# Patient Record
Sex: FEMALE | Race: WHITE | Employment: OTHER | URBAN - METROPOLITAN AREA
[De-identification: names, ages, dates, MRNs, and addresses within clinical notes are randomized per-mention and may not be internally consistent; named-entity substitution may affect disease eponyms.]

---

## 2018-09-23 ENCOUNTER — HOSPITAL ENCOUNTER (INPATIENT)
Age: 83
LOS: 4 days | Discharge: HOME HEALTH | DRG: 291 | End: 2018-09-27
Attending: EMERGENCY MEDICINE | Admitting: HOSPITALIST
Payer: MEDICARE

## 2018-09-23 ENCOUNTER — APPOINTMENT (OUTPATIENT)
Dept: GENERAL RADIOLOGY | Age: 83
DRG: 291 | End: 2018-09-23
Payer: MEDICARE

## 2018-09-23 DIAGNOSIS — E87.1 HYPONATREMIA: ICD-10-CM

## 2018-09-23 DIAGNOSIS — E87.6 HYPOKALEMIA: ICD-10-CM

## 2018-09-23 DIAGNOSIS — I50.23 ACUTE ON CHRONIC SYSTOLIC CONGESTIVE HEART FAILURE (HCC): ICD-10-CM

## 2018-09-23 DIAGNOSIS — R06.00 DYSPNEA, UNSPECIFIED TYPE: Primary | ICD-10-CM

## 2018-09-23 DIAGNOSIS — E83.41 HYPERMAGNESEMIA: ICD-10-CM

## 2018-09-23 PROBLEM — I10 HTN (HYPERTENSION): Status: ACTIVE | Noted: 2018-09-23

## 2018-09-23 PROBLEM — N17.9 AKI (ACUTE KIDNEY INJURY) (HCC): Status: ACTIVE | Noted: 2018-09-23

## 2018-09-23 PROBLEM — I50.31 ACUTE DIASTOLIC HEART FAILURE (HCC): Status: ACTIVE | Noted: 2018-09-23

## 2018-09-23 LAB
A/G RATIO: 0.7 (ref 1.1–2.2)
ALBUMIN SERPL-MCNC: 3.4 G/DL (ref 3.4–5)
ALP BLD-CCNC: 158 U/L (ref 40–129)
ALT SERPL-CCNC: 24 U/L (ref 10–40)
ANION GAP SERPL CALCULATED.3IONS-SCNC: 15 MMOL/L (ref 3–16)
ANION GAP SERPL CALCULATED.3IONS-SCNC: 16 MMOL/L (ref 3–16)
AST SERPL-CCNC: 30 U/L (ref 15–37)
BACTERIA: ABNORMAL /HPF
BACTERIA: ABNORMAL /HPF
BASOPHILS ABSOLUTE: 0 K/UL (ref 0–0.2)
BASOPHILS RELATIVE PERCENT: 0.4 %
BILIRUB SERPL-MCNC: 0.9 MG/DL (ref 0–1)
BILIRUBIN URINE: NEGATIVE
BILIRUBIN URINE: NEGATIVE
BLOOD, URINE: NEGATIVE
BLOOD, URINE: NEGATIVE
BUN BLDV-MCNC: 37 MG/DL (ref 7–20)
BUN BLDV-MCNC: 38 MG/DL (ref 7–20)
CALCIUM SERPL-MCNC: 9.3 MG/DL (ref 8.3–10.6)
CALCIUM SERPL-MCNC: 9.7 MG/DL (ref 8.3–10.6)
CHLORIDE BLD-SCNC: 77 MMOL/L (ref 99–110)
CHLORIDE BLD-SCNC: 80 MMOL/L (ref 99–110)
CLARITY: ABNORMAL
CLARITY: CLEAR
CO2: 36 MMOL/L (ref 21–32)
CO2: 36 MMOL/L (ref 21–32)
COLOR: YELLOW
COLOR: YELLOW
CREAT SERPL-MCNC: 1.8 MG/DL (ref 0.6–1.2)
CREAT SERPL-MCNC: 1.8 MG/DL (ref 0.6–1.2)
EOSINOPHILS ABSOLUTE: 0.1 K/UL (ref 0–0.6)
EOSINOPHILS RELATIVE PERCENT: 0.5 %
EPITHELIAL CELLS, UA: 0 /HPF (ref 0–5)
EPITHELIAL CELLS, UA: 0 /HPF (ref 0–5)
GFR AFRICAN AMERICAN: 32
GFR AFRICAN AMERICAN: 32
GFR NON-AFRICAN AMERICAN: 27
GFR NON-AFRICAN AMERICAN: 27
GLOBULIN: 5 G/DL
GLUCOSE BLD-MCNC: 214 MG/DL (ref 70–99)
GLUCOSE BLD-MCNC: 237 MG/DL (ref 70–99)
GLUCOSE URINE: NEGATIVE MG/DL
GLUCOSE URINE: NEGATIVE MG/DL
HCT VFR BLD CALC: 37.5 % (ref 36–48)
HEMOGLOBIN: 12.8 G/DL (ref 12–16)
HYALINE CASTS: 1 /LPF (ref 0–8)
HYALINE CASTS: 3 /LPF (ref 0–8)
KETONES, URINE: NEGATIVE MG/DL
KETONES, URINE: NEGATIVE MG/DL
LEUKOCYTE ESTERASE, URINE: ABNORMAL
LEUKOCYTE ESTERASE, URINE: ABNORMAL
LIPASE: 16 U/L (ref 13–60)
LYMPHOCYTES ABSOLUTE: 1 K/UL (ref 1–5.1)
LYMPHOCYTES RELATIVE PERCENT: 8.9 %
MAGNESIUM: 2.5 MG/DL (ref 1.8–2.4)
MCH RBC QN AUTO: 30.1 PG (ref 26–34)
MCHC RBC AUTO-ENTMCNC: 34.2 G/DL (ref 31–36)
MCV RBC AUTO: 88 FL (ref 80–100)
MICROSCOPIC EXAMINATION: YES
MICROSCOPIC EXAMINATION: YES
MONOCYTES ABSOLUTE: 1 K/UL (ref 0–1.3)
MONOCYTES RELATIVE PERCENT: 9 %
NEUTROPHILS ABSOLUTE: 9 K/UL (ref 1.7–7.7)
NEUTROPHILS RELATIVE PERCENT: 81.2 %
NITRITE, URINE: POSITIVE
NITRITE, URINE: POSITIVE
PDW BLD-RTO: 15.9 % (ref 12.4–15.4)
PH UA: 6.5
PH UA: 6.5
PLATELET # BLD: 324 K/UL (ref 135–450)
PMV BLD AUTO: 9.2 FL (ref 5–10.5)
POTASSIUM REFLEX MAGNESIUM: 2.6 MMOL/L (ref 3.5–5.1)
POTASSIUM SERPL-SCNC: 3.1 MMOL/L (ref 3.5–5.1)
PRO-BNP: 715 PG/ML (ref 0–449)
PROTEIN UA: NEGATIVE MG/DL
PROTEIN UA: NEGATIVE MG/DL
RBC # BLD: 4.27 M/UL (ref 4–5.2)
RBC UA: 0 /HPF (ref 0–4)
RBC UA: 1 /HPF (ref 0–4)
SODIUM BLD-SCNC: 129 MMOL/L (ref 136–145)
SODIUM BLD-SCNC: 131 MMOL/L (ref 136–145)
SPECIFIC GRAVITY UA: 1.01
SPECIFIC GRAVITY UA: 1.01
TOTAL PROTEIN: 8.4 G/DL (ref 6.4–8.2)
TROPONIN: <0.01 NG/ML
URIC ACID, SERUM: 13.9 MG/DL (ref 2.6–6)
URINE TYPE: ABNORMAL
UROBILINOGEN, URINE: 1 E.U./DL
UROBILINOGEN, URINE: 1 E.U./DL
WBC # BLD: 11.1 K/UL (ref 4–11)
WBC UA: 37 /HPF (ref 0–5)
WBC UA: 38 /HPF (ref 0–5)

## 2018-09-23 PROCEDURE — 83930 ASSAY OF BLOOD OSMOLALITY: CPT

## 2018-09-23 PROCEDURE — 6370000000 HC RX 637 (ALT 250 FOR IP): Performed by: EMERGENCY MEDICINE

## 2018-09-23 PROCEDURE — 36415 COLL VENOUS BLD VENIPUNCTURE: CPT

## 2018-09-23 PROCEDURE — 83735 ASSAY OF MAGNESIUM: CPT

## 2018-09-23 PROCEDURE — 81001 URINALYSIS AUTO W/SCOPE: CPT

## 2018-09-23 PROCEDURE — 83880 ASSAY OF NATRIURETIC PEPTIDE: CPT

## 2018-09-23 PROCEDURE — 82570 ASSAY OF URINE CREATININE: CPT

## 2018-09-23 PROCEDURE — 85025 COMPLETE CBC W/AUTO DIFF WBC: CPT

## 2018-09-23 PROCEDURE — 6360000002 HC RX W HCPCS: Performed by: EMERGENCY MEDICINE

## 2018-09-23 PROCEDURE — 94640 AIRWAY INHALATION TREATMENT: CPT

## 2018-09-23 PROCEDURE — 2580000003 HC RX 258: Performed by: INTERNAL MEDICINE

## 2018-09-23 PROCEDURE — 2500000003 HC RX 250 WO HCPCS: Performed by: HOSPITALIST

## 2018-09-23 PROCEDURE — 93005 ELECTROCARDIOGRAM TRACING: CPT | Performed by: EMERGENCY MEDICINE

## 2018-09-23 PROCEDURE — 83690 ASSAY OF LIPASE: CPT

## 2018-09-23 PROCEDURE — 51702 INSERT TEMP BLADDER CATH: CPT

## 2018-09-23 PROCEDURE — 99291 CRITICAL CARE FIRST HOUR: CPT

## 2018-09-23 PROCEDURE — 84133 ASSAY OF URINE POTASSIUM: CPT

## 2018-09-23 PROCEDURE — 83935 ASSAY OF URINE OSMOLALITY: CPT

## 2018-09-23 PROCEDURE — 84300 ASSAY OF URINE SODIUM: CPT

## 2018-09-23 PROCEDURE — 84550 ASSAY OF BLOOD/URIC ACID: CPT

## 2018-09-23 PROCEDURE — 82436 ASSAY OF URINE CHLORIDE: CPT

## 2018-09-23 PROCEDURE — 6360000002 HC RX W HCPCS: Performed by: HOSPITALIST

## 2018-09-23 PROCEDURE — 2580000003 HC RX 258: Performed by: HOSPITALIST

## 2018-09-23 PROCEDURE — 80053 COMPREHEN METABOLIC PANEL: CPT

## 2018-09-23 PROCEDURE — 2060000000 HC ICU INTERMEDIATE R&B

## 2018-09-23 PROCEDURE — 71046 X-RAY EXAM CHEST 2 VIEWS: CPT

## 2018-09-23 PROCEDURE — 96374 THER/PROPH/DIAG INJ IV PUSH: CPT

## 2018-09-23 PROCEDURE — 6370000000 HC RX 637 (ALT 250 FOR IP): Performed by: HOSPITALIST

## 2018-09-23 PROCEDURE — 84540 ASSAY OF URINE/UREA-N: CPT

## 2018-09-23 PROCEDURE — 2700000000 HC OXYGEN THERAPY PER DAY

## 2018-09-23 PROCEDURE — 84484 ASSAY OF TROPONIN QUANT: CPT

## 2018-09-23 PROCEDURE — 94760 N-INVAS EAR/PLS OXIMETRY 1: CPT

## 2018-09-23 RX ORDER — VENLAFAXINE HYDROCHLORIDE 150 MG/1
150 CAPSULE, EXTENDED RELEASE ORAL DAILY
Status: DISCONTINUED | OUTPATIENT
Start: 2018-09-24 | End: 2018-09-27 | Stop reason: HOSPADM

## 2018-09-23 RX ORDER — FUROSEMIDE 10 MG/ML
80 INJECTION INTRAMUSCULAR; INTRAVENOUS ONCE
Status: COMPLETED | OUTPATIENT
Start: 2018-09-23 | End: 2018-09-23

## 2018-09-23 RX ORDER — FUROSEMIDE 40 MG/1
40 TABLET ORAL PRN
COMMUNITY

## 2018-09-23 RX ORDER — SODIUM CHLORIDE 0.9 % (FLUSH) 0.9 %
10 SYRINGE (ML) INJECTION EVERY 12 HOURS SCHEDULED
Status: DISCONTINUED | OUTPATIENT
Start: 2018-09-23 | End: 2018-09-27 | Stop reason: HOSPADM

## 2018-09-23 RX ORDER — ONDANSETRON 2 MG/ML
4 INJECTION INTRAMUSCULAR; INTRAVENOUS EVERY 6 HOURS PRN
Status: DISCONTINUED | OUTPATIENT
Start: 2018-09-23 | End: 2018-09-27 | Stop reason: HOSPADM

## 2018-09-23 RX ORDER — POTASSIUM CHLORIDE 20 MEQ/1
40 TABLET, EXTENDED RELEASE ORAL PRN
Status: DISCONTINUED | OUTPATIENT
Start: 2018-09-23 | End: 2018-09-25

## 2018-09-23 RX ORDER — POTASSIUM CHLORIDE 7.45 MG/ML
10 INJECTION INTRAVENOUS PRN
Status: DISCONTINUED | OUTPATIENT
Start: 2018-09-23 | End: 2018-09-25

## 2018-09-23 RX ORDER — SODIUM CHLORIDE 9 MG/ML
INJECTION, SOLUTION INTRAVENOUS CONTINUOUS
Status: DISCONTINUED | OUTPATIENT
Start: 2018-09-23 | End: 2018-09-24

## 2018-09-23 RX ORDER — POTASSIUM CHLORIDE 7.45 MG/ML
10 INJECTION INTRAVENOUS
Status: COMPLETED | OUTPATIENT
Start: 2018-09-23 | End: 2018-09-23

## 2018-09-23 RX ORDER — DIGOXIN 125 MCG
125 TABLET ORAL DAILY
Status: DISCONTINUED | OUTPATIENT
Start: 2018-09-23 | End: 2018-09-24

## 2018-09-23 RX ORDER — TRIAMTERENE AND HYDROCHLOROTHIAZIDE 37.5; 25 MG/1; MG/1
1 TABLET ORAL DAILY
Status: ON HOLD | COMMUNITY
End: 2018-09-27 | Stop reason: HOSPADM

## 2018-09-23 RX ORDER — POTASSIUM CHLORIDE 20 MEQ/1
40 TABLET, EXTENDED RELEASE ORAL ONCE
Status: COMPLETED | OUTPATIENT
Start: 2018-09-23 | End: 2018-09-23

## 2018-09-23 RX ORDER — VENLAFAXINE 75 MG/1
150 TABLET ORAL DAILY
Status: ON HOLD | COMMUNITY
End: 2022-01-01

## 2018-09-23 RX ORDER — SODIUM CHLORIDE 9 MG/ML
INJECTION, SOLUTION INTRAVENOUS
Status: DISPENSED
Start: 2018-09-23 | End: 2018-09-24

## 2018-09-23 RX ORDER — SODIUM CHLORIDE 0.9 % (FLUSH) 0.9 %
10 SYRINGE (ML) INJECTION PRN
Status: DISCONTINUED | OUTPATIENT
Start: 2018-09-23 | End: 2018-09-27 | Stop reason: HOSPADM

## 2018-09-23 RX ORDER — METHYLPREDNISOLONE SODIUM SUCCINATE 40 MG/ML
40 INJECTION, POWDER, LYOPHILIZED, FOR SOLUTION INTRAMUSCULAR; INTRAVENOUS ONCE
Status: COMPLETED | OUTPATIENT
Start: 2018-09-23 | End: 2018-09-23

## 2018-09-23 RX ORDER — DIGOXIN 125 MCG
125 TABLET ORAL DAILY
COMMUNITY

## 2018-09-23 RX ADMIN — Medication 10 MEQ: at 14:25

## 2018-09-23 RX ADMIN — Medication 10 MEQ: at 16:30

## 2018-09-23 RX ADMIN — Medication 10 ML: at 21:00

## 2018-09-23 RX ADMIN — Medication 2 PUFF: at 21:11

## 2018-09-23 RX ADMIN — CEFTRIAXONE SODIUM 1 G: 10 INJECTION, POWDER, FOR SOLUTION INTRAVENOUS at 21:00

## 2018-09-23 RX ADMIN — POTASSIUM CHLORIDE 40 MEQ: 1500 TABLET, EXTENDED RELEASE ORAL at 14:25

## 2018-09-23 RX ADMIN — FUROSEMIDE 80 MG: 10 INJECTION, SOLUTION INTRAMUSCULAR; INTRAVENOUS at 13:27

## 2018-09-23 RX ADMIN — SODIUM CHLORIDE: 9 INJECTION, SOLUTION INTRAVENOUS at 17:57

## 2018-09-23 RX ADMIN — METHYLPREDNISOLONE SODIUM SUCCINATE 40 MG: 40 INJECTION, POWDER, FOR SOLUTION INTRAMUSCULAR; INTRAVENOUS at 16:30

## 2018-09-23 ASSESSMENT — ENCOUNTER SYMPTOMS
DIARRHEA: 0
BACK PAIN: 0
CHEST TIGHTNESS: 0
NAUSEA: 0
COUGH: 1
RHINORRHEA: 0
VOMITING: 0
SORE THROAT: 0
CONSTIPATION: 0
SHORTNESS OF BREATH: 1
ABDOMINAL PAIN: 0
WHEEZING: 1

## 2018-09-23 ASSESSMENT — PAIN SCALES - GENERAL: PAINLEVEL_OUTOF10: 0

## 2018-09-23 NOTE — ED PROVIDER NOTES
decreased urine volume, difficulty urinating, dysuria, flank pain, frequency and urgency. Musculoskeletal: Negative for back pain and neck pain. Skin: Negative for rash. Neurological: Negative for dizziness, weakness, numbness and headaches. Psychiatric/Behavioral: Negative for suicidal ideas. All other systems reviewed and are negative. Except as noted above the remainder of the review of systems was reviewed and negative. PAST MEDICAL HISTORY     Past Medical History:   Diagnosis Date    [facility]est[facility]     COPD [facility]chronic obstructive [facility] disease) ([facility]     Depression     Hypertension          SURGICAL HISTORY     History reviewed. No pertinent surgical history. CURRENT MEDICATIONS       Previous Medications    DIGOXIN (LANOXIN) 125 MCG TABLET    Take 125 mcg by mouth daily    FUROSEMIDE (LASIX) 40 MG TABLET    Take 80 mg by mouth 2 times daily    TRIAMTERENE-HYDROCHLOROTHIAZIDE (MAXZIDE-25) 37.5-25 MG PER TABLET    Take 1 tablet by mouth daily    VENLAFAXINE (EFFEXOR) 75 MG TABLET    Take by mouth 3 times daily 3 tabs po tid       ALLERGIES     Pcn [penicillins]    FAMILY HISTORY     History reviewed. No pertinent family history. SOCIAL HISTORY       Social History     Social History    Marital status:      Spouse name: N/A    Number of children: N/A    Years of education: N/A     Social History Main Topics    Smoking status: Never Smoker    Smokeless tobacco: None    Alcohol use No    Drug use: Unknown    Sexual activity: Not Asked     Other Topics Concern    None     Social History Narrative    None       SCREENINGS             PHYSICAL EXAM    (up to 7 for level 4, 8 or more for level 5)     ED Triage Vitals   BP Temp Temp src Pulse Resp SpO2 Height Weight   -- -- -- -- -- -- -- --       Physical Exam   Constitutional: She is oriented to person, place, and time. She appears well-developed and well-nourished. No distress.    HENT: Head: Normocephalic and atraumatic. Right Ear: External ear normal.   Left Ear: External ear normal.   Nose: Nose normal.   Mouth/Throat: Oropharynx is clear and moist. No oropharyngeal exudate. Eyes: Pupils are equal, round, and reactive to light. Conjunctivae and EOM are normal. No scleral icterus. Neck: Normal range of motion. Neck supple. No JVD present. Cardiovascular: Normal rate, regular rhythm, normal heart sounds and intact distal pulses. Exam reveals no gallop and no friction rub. No murmur heard. Pulmonary/Chest: She has wheezes. She has rales. Scattered wheezes, appears slightly dyspneic   Abdominal: Soft. Bowel sounds are normal. She exhibits no distension and no mass. There is no tenderness. There is no rebound and no guarding. Musculoskeletal: Normal range of motion. She exhibits edema. She exhibits no tenderness or deformity. Bilateral lower extremity edema, truncal edema   Lymphadenopathy:     She has no cervical adenopathy. Neurological: She is alert and oriented to person, place, and time. She has normal reflexes. No cranial nerve deficit. She exhibits normal muscle tone. Coordination normal.   Skin: Skin is warm and dry. She is not diaphoretic. Psychiatric: She has a normal mood and affect. Nursing note and vitals reviewed. DIAGNOSTIC RESULTS     EKG: All EKG's are interpreted by the Emergency Department Physician who either signs or Co-signs this chart in the absence of a cardiologist.    Sinus tachycardia rate of 104 otherwise normal intervals no ST elevations or depressions, nonspecific ST abnormality, no previous for comparison.     RADIOLOGY:   Non-plain film images such as CT, Ultrasound and MRI are read by the radiologist. Plain radiographic images are visualized and preliminarily interpreted by the emergency physician with the below findings:        Interpretation per the Radiologist below, if available at the time of this note:    XR CHEST STANDARD (2 VW) Final Result   Perihilar and mild bibasilar ground-glass opacities may represent mild   pulmonary edema. A viral infection may also be considered clinically.                ED BEDSIDE ULTRASOUND:   Performed by ED Physician - none    LABS:  Labs Reviewed   CBC WITH AUTO DIFFERENTIAL - Abnormal; Notable for the following:        Result Value    WBC 11.1 (*)     RDW 15.9 (*)     Neutrophils # 9.0 (*)     All other components within normal limits    Narrative:     Performed at:  [facility]  [address]   Phone [phone]   COMPREHENSIVE METABOLIC PANEL W/ REFLEX TO MG FOR LOW K - Abnormal; Notable for the following:     Sodium 129 (*)     Potassium reflex Magnesium 2.6 (*)     Chloride 77 (*)     CO2 36 (*)     Glucose 214 (*)     BUN 38 (*)     CREATININE 1.8 (*)     GFR Non- 27 (*)     GFR  32 (*)     Total Protein 8.4 (*)     Albumin/Globulin Ratio 0.7 (*)     Alkaline Phosphatase 158 (*)     All other components within normal limits    Narrative:     YINKA Torres  [facility] tel. [phone],  Chemistry results called to and read back by Hebert Sebastian RN, 09/23/2018  13:59, by [facility]  Performed at:  [facility]  [address] [facility], [address]   Phone [phone] - Abnormal; Notable for the following:     Pro- (*)     All other components within normal limits    Narrative:     Kezia Jacobo  [facility] tel. [phone],  Chemistry results called to and read back by Hebert Sebastian RN, 09/23/2018  13:59, by [facility]  Performed at:  [facility] [address] [facility] [address], [address]   Phone [phone]   MAGNESIUM - Abnormal; Notable for the following:     Magnesium 2.50 (*)     All other components within normal limits    Narrative:     Kezia Jacobo  [facility] tel. [phone],  Chemistry results called to and read back by Hebert Sebastian RN,

## 2018-09-23 NOTE — H&P
outpatient prescriptions on file prior to encounter. Allergies: Allergies   Allergen Reactions    Pcn [Penicillins]         Social History:   reports that she has never smoked. She does not have any smokeless tobacco history on file. She reports that she does not drink alcohol. Family History:  Sister CAD    Physical Exam:  /70   Pulse 109   Temp 97.8 °F (36.6 °C)   Resp 26   Wt 250 lb (113.4 kg)   SpO2 (!) 88%     General appearance:  Appears comfortable. Well nourished  Eyes: Sclera clear, pupils equal  ENT: Moist mucus membranes, no thrush. Trachea midline. Cardiovascular: Regular rhythm, normal S1, S2. No murmur, gallop, rub. No edema in lower extremities  Respiratory: b/l wheezing with crackels at the base   Gastrointestinal: Abdomen soft, non-tender, not distended, normal bowel sounds  Musculoskeletal: No cyanosis in digits, neck supple  Neurology: Cranial nerves grossly intact. Alert and oriented in time, place and person. No speech or motor deficits  Psychiatry: Appropriate affect. Not agitated  Skin: Warm, dry, normal turgor, no rash    Labs:  CBC:   Lab Results   Component Value Date    WBC 11.1 09/23/2018    RBC 4.27 09/23/2018    HGB 12.8 09/23/2018    HCT 37.5 09/23/2018    MCV 88.0 09/23/2018    MCH 30.1 09/23/2018    MCHC 34.2 09/23/2018    RDW 15.9 09/23/2018     09/23/2018    MPV 9.2 09/23/2018     BMP:    Lab Results   Component Value Date     09/23/2018    K 2.6 09/23/2018    CL 77 09/23/2018    CO2 36 09/23/2018    BUN 38 09/23/2018    CREATININE 1.8 09/23/2018    CALCIUM 9.7 09/23/2018    GFRAA 32 09/23/2018    LABGLOM 27 09/23/2018    GLUCOSE 214 09/23/2018       Chest Xray:   EKG:        Problem List  Active Problems:    Acute diastolic heart failure ([address]  Resolved Problems:    * No resolved hospital problems. *        Assessment/Plan:     Acute on chronic CHF exab  - likely cause of pt symptoms.  Failed outpatient regimen.   - given 80 of iv lasix in the

## 2018-09-23 NOTE — CONSULTS
MD Bouchra Jackson MD Michale Spray, MD               Office: [phone]                      Fax: [phone]            NEPHROLOGY INITIAL CONSULT NOTE:       PATIENT NAME: Wilmer Coe  : 1931  MRN: [id number]  SERVICE DATE: 2018   SERVICE TIME: 9:17 PM    REASON FOR CONSULT: I am asked to see this patient in consultation for my opinion  regarding management of Hyponatremia. My recommendations will be communicated by way of shared medical record. REQUESTING PHYSICIAN: Dr Ruth Hinkle: Horace Garcia DO    CHIEF COMPLAINT:   Chief Complaint   Patient presents with    Shortness of Breath     Patient c/o SOB and cough, progressively worse. patient hx of CHF. patient went to urgent care friday due to unable to get into PCP. Patinet has gotten progressively worse      History Obtained From:  patient, family member - her daughter, electronic medical record    HPI: Ms. Jacquiline Gitelman is a 80 y.o. female with significant past medical history of HTN COPD, depression, CHF , presents with with worsening shortness of breath and edema - was seen by her PCP 1 day ago - who increased her lasix from 40 once to 40 times a day - but no improvement in her symptoms. Also ad productive cough, no fever/chills. Patient denied chest pain/dizziness/lightheadedness/syncope/leg edema. Found to have Hyponatremia so we are called. Re: Hyponatremia  · Duration (when): acute   · Location (where): kidneys  · Severity: lowest SNa 129   · Context (ex: activity at onset or related to condition): HCTZ use, PRABHA pre-renal w/ on-going lasix, HCTZ, Triamterene use, + ? pneumonia  · Timing (ex: continuous, intermittent): continous  · Modifying factors (ex: medications, interventions): in ER first lasix --> then IVFs  · Associated signs & symptoms (ex: edema, SOB): as above     PAST MEDICAL HISTORY:   Past Medical History:   Diagnosis Date    [facility]congestive [facility] failure) ([facility]     COPD (chronic obstructive pulmonary disease) ([facility]     Depression     Hypertension      PAST SURGICAL HISTORY: History reviewed. No pertinent surgical history. FAMILY HISTORY: History reviewed. No pertinent family history. no known [address] of kidney disease  SOCIAL HISTORY:   Social History     Social History    Marital status:      Spouse name: N/A    Number of children: N/A    Years of education: N/A     Social History Main Topics    Smoking status: Never Smoker    Smokeless tobacco: None    Alcohol use No    Drug use: Unknown    Sexual activity: Not Asked     Other Topics Concern    None     Social History Narrative    None     MEDICATIONS:  Prior to Admission Medications:  Prescriptions Prior to Admission: triamterene-hydrochlorothiazide (MAXZIDE-25) 37.5-25 MG per tablet, Take 1 tablet by mouth daily  furosemide (LASIX) 40 MG tablet, Take 80 mg by mouth 2 times daily  venlafaxine (EFFEXOR) 75 MG tablet, Take by mouth 3 times daily 3 tabs po tid  digoxin (LANOXIN) 125 MCG tablet, Take 125 mcg by mouth daily     Scheduled Meds:   digoxin  125 mcg Oral Daily    [START ON 9/24/2018] venlafaxine  150 mg Oral Daily    sodium chloride flush  10 mL Intravenous 2 times per day    [START ON 9/24/2018] enoxaparin  40 mg Subcutaneous Daily    mometasone-formoterol  2 puff Inhalation BID    cefTRIAXone (ROCEPHIN) IV  1 g Intravenous Q24H     Continuous Infusions:   sodium chloride      sodium chloride 50 mL/hr at 09/23/18 1757     PRN Meds:. Allergies: Pcn [penicillins]    REVIEW OF SYSTEMS:  As mentioned in HPI   All other 10-point review of systems: negative.          PHYSICAL EXAM:  Patient Vitals for the past 24 hrs:   BP Temp Temp src Pulse Resp SpO2 Height Weight   09/23/18 2111 - - - - 17 94 % - -   09/23/18 2020 (!) 111/47 99 °F (37.2 °C) Oral 99 18 93 % - -   09/23/18 1634 - - - - 18 91 % - -   09/23/18 1600 124/65 98.9 °F (37.2 °C) Oral 100 24 91 % 4' 10\" Nephrotoxic medications have been discontinued. - Dose adjusted and appropriate. - Dose meds for eGFR ~30 mL/min/1.73m2.   - Avoid heavy opioids due to renal failure - may use very low dose dilaudid / fentanyl with close monitoring of CNS and respiratory depression. High Complexity. Multiple complex problems. Discussed with patient, family - her daughter and treatment team- referring physician  Thank you for allowing me to participate in this patient's care. Please do not hesitate to contact me with any questions/concerns. We will follow along with you.      Issac Gross MD       [facility] [address] [place] [address]  Office: [phone]   Fax: [phone]

## 2018-09-24 LAB
ANION GAP SERPL CALCULATED.3IONS-SCNC: 15 MMOL/L (ref 3–16)
BUN BLDV-MCNC: 38 MG/DL (ref 7–20)
CALCIUM SERPL-MCNC: 9.5 MG/DL (ref 8.3–10.6)
CHLORIDE BLD-SCNC: 87 MMOL/L (ref 99–110)
CHLORIDE URINE RANDOM: 63 MMOL/L
CO2: 35 MMOL/L (ref 21–32)
CREAT SERPL-MCNC: 1.7 MG/DL (ref 0.6–1.2)
CREATININE URINE: 64.4 MG/DL (ref 28–259)
GFR AFRICAN AMERICAN: 34
GFR NON-AFRICAN AMERICAN: 28
GLUCOSE BLD-MCNC: 183 MG/DL (ref 70–99)
MAGNESIUM: 2.7 MG/DL (ref 1.8–2.4)
OSMOLALITY URINE: 289 MOSM/KG (ref 390–1070)
OSMOLALITY: 278 MOSM/KG (ref 278–305)
POTASSIUM SERPL-SCNC: 3.3 MMOL/L (ref 3.5–5.1)
POTASSIUM, UR: 29.6 MMOL/L
PRO-BNP: 993 PG/ML (ref 0–449)
SODIUM BLD-SCNC: 132 MMOL/L (ref 136–145)
SODIUM BLD-SCNC: 132 MMOL/L (ref 136–145)
SODIUM BLD-SCNC: 134 MMOL/L (ref 136–145)
SODIUM BLD-SCNC: 134 MMOL/L (ref 136–145)
SODIUM BLD-SCNC: 137 MMOL/L (ref 136–145)
SODIUM URINE: 55 MMOL/L
UREA NITROGEN, UR: 262.3 MG/DL (ref 800–1666)

## 2018-09-24 PROCEDURE — 2700000000 HC OXYGEN THERAPY PER DAY

## 2018-09-24 PROCEDURE — 6370000000 HC RX 637 (ALT 250 FOR IP): Performed by: HOSPITALIST

## 2018-09-24 PROCEDURE — 94640 AIRWAY INHALATION TREATMENT: CPT

## 2018-09-24 PROCEDURE — 2500000003 HC RX 250 WO HCPCS: Performed by: HOSPITALIST

## 2018-09-24 PROCEDURE — 80048 BASIC METABOLIC PNL TOTAL CA: CPT

## 2018-09-24 PROCEDURE — 36415 COLL VENOUS BLD VENIPUNCTURE: CPT

## 2018-09-24 PROCEDURE — 2060000000 HC ICU INTERMEDIATE R&B

## 2018-09-24 PROCEDURE — 84295 ASSAY OF SERUM SODIUM: CPT

## 2018-09-24 PROCEDURE — 83880 ASSAY OF NATRIURETIC PEPTIDE: CPT

## 2018-09-24 PROCEDURE — 83735 ASSAY OF MAGNESIUM: CPT

## 2018-09-24 PROCEDURE — 6370000000 HC RX 637 (ALT 250 FOR IP): Performed by: INTERNAL MEDICINE

## 2018-09-24 PROCEDURE — 94760 N-INVAS EAR/PLS OXIMETRY 1: CPT

## 2018-09-24 PROCEDURE — 6360000002 HC RX W HCPCS: Performed by: HOSPITALIST

## 2018-09-24 PROCEDURE — 99223 1ST HOSP IP/OBS HIGH 75: CPT | Performed by: INTERNAL MEDICINE

## 2018-09-24 PROCEDURE — 93010 ELECTROCARDIOGRAM REPORT: CPT | Performed by: INTERNAL MEDICINE

## 2018-09-24 PROCEDURE — 6360000002 HC RX W HCPCS: Performed by: INTERNAL MEDICINE

## 2018-09-24 PROCEDURE — 2580000003 HC RX 258: Performed by: HOSPITALIST

## 2018-09-24 RX ORDER — FUROSEMIDE 10 MG/ML
20 INJECTION INTRAMUSCULAR; INTRAVENOUS 2 TIMES DAILY
Status: DISCONTINUED | OUTPATIENT
Start: 2018-09-24 | End: 2018-09-26

## 2018-09-24 RX ORDER — SPIRONOLACTONE 25 MG/1
25 TABLET ORAL DAILY
Status: DISCONTINUED | OUTPATIENT
Start: 2018-09-24 | End: 2018-09-27 | Stop reason: HOSPADM

## 2018-09-24 RX ORDER — FUROSEMIDE 20 MG/1
20 TABLET ORAL 2 TIMES DAILY
Status: DISCONTINUED | OUTPATIENT
Start: 2018-09-24 | End: 2018-09-24

## 2018-09-24 RX ORDER — FUROSEMIDE 10 MG/ML
20 INJECTION INTRAMUSCULAR; INTRAVENOUS 2 TIMES DAILY
Status: DISCONTINUED | OUTPATIENT
Start: 2018-09-24 | End: 2018-09-24

## 2018-09-24 RX ADMIN — ENOXAPARIN SODIUM 40 MG: 40 INJECTION SUBCUTANEOUS at 11:17

## 2018-09-24 RX ADMIN — Medication 10 ML: at 11:19

## 2018-09-24 RX ADMIN — POTASSIUM CHLORIDE 40 MEQ: 1500 TABLET, EXTENDED RELEASE ORAL at 00:39

## 2018-09-24 RX ADMIN — Medication 10 ML: at 22:07

## 2018-09-24 RX ADMIN — POTASSIUM CHLORIDE 40 MEQ: 1500 TABLET, EXTENDED RELEASE ORAL at 11:19

## 2018-09-24 RX ADMIN — Medication 2 PUFF: at 08:33

## 2018-09-24 RX ADMIN — SPIRONOLACTONE 25 MG: 25 TABLET ORAL at 17:39

## 2018-09-24 RX ADMIN — VENLAFAXINE HYDROCHLORIDE 150 MG: 150 CAPSULE, EXTENDED RELEASE ORAL at 11:31

## 2018-09-24 RX ADMIN — CEFTRIAXONE SODIUM 1 G: 10 INJECTION, POWDER, FOR SOLUTION INTRAVENOUS at 21:56

## 2018-09-24 RX ADMIN — FUROSEMIDE 20 MG: 10 INJECTION, SOLUTION INTRAMUSCULAR; INTRAVENOUS at 17:40

## 2018-09-24 RX ADMIN — POTASSIUM CHLORIDE 40 MEQ: 1500 TABLET, EXTENDED RELEASE ORAL at 21:56

## 2018-09-24 ASSESSMENT — PAIN SCALES - GENERAL
PAINLEVEL_OUTOF10: 0

## 2018-09-24 NOTE — PROGRESS NOTES
Effexor dose verified with daughter at bedside d/t conflicting information in chart. Pt daughter states that the pt takes this only once a day in the AM and she takes 150mg dose. Pharmacy notified.

## 2018-09-24 NOTE — CONSULTS
[facility]  [facility]/Pulmonary Hypertension   Cardiac Evaluation      Nate Alonzo  YOB: 1931    Requesting Physician:  Silviano Chung      Chief Complaint   Patient presents with    Shortness of Breath     Patient c/o SOB and cough, progressively worse. patient hx of CHF. patient went to urgent care friday due to unable to get into PCP. Patinet has gotten progressively worse         History of Present Illness:  Nate Alonzo is an 79 yo female with a history of CHF and COPD who presents to the ED with worsening shortness of breath and edema. She says that the symptoms have been going on for awhile but had gotten progressively worse. She was diagnosed in the past year with CHF by Dr. Kati Alvarado, and was placed on diuretic. She has not had an echo. She went to an urgent care and digoxin and lasix was started. She saw her physician on Friday who increased her lasix from 40 mg once a day to 40 mg twice a day. Despite this, she did not see any improvement. She has had increasing cough and shortness of breath. She sleeps sitting up in a chair but she has done this for years. She is coughing up clear sputum. No fever or chills, chest pain or diaphoresis. No headaches, numbness, tingling, or paresthesias. Her shortness of breath was worse with activity. Her daughter is with her and at the bedside. Today she is feeling better. She is sitting up in a chair. Diuresing.     Allergies   Allergen Reactions    Pcn [Penicillins]      Current Facility-Administered Medications   Medication Dose Route Frequency Provider Last Rate Last Dose    furosemide (LASIX) tablet 20 mg  20 mg Oral BID Aj Bernard MD        digoxin (LANOXIN) tablet 125 mcg  125 mcg Oral Daily Edna Higgins MD        venlafaxine (EFFEXOR XR) extended release capsule 150 mg  150 mg Oral Daily Silviano Chung MD   150 mg at 09/24/18 1131    sodium chloride flush 0.9 % injection 10 mL  10 mL Intravenous 2 times per day Jaden Bazan MD   10 mL at 09/24/18 1119    sodium chloride flush 0.9 % injection 10 mL  10 mL Intravenous PRN Edna Higgins MD        magnesium hydroxide (MILK OF MAGNESIA) 400 MG/5ML suspension 30 mL  30 mL Oral Daily PRN Edna Higgins MD        ondansetron (ZOFRAN) injection 4 mg  4 mg Intravenous Q6H PRN Edna Higgins MD        enoxaparin (LOVENOX) injection 40 mg  40 mg Subcutaneous Daily Edna Higgins MD   40 mg at 09/24/18 1117    potassium chloride (KLOR-CON M) extended release tablet 40 mEq  40 mEq Oral PRN Edna Higgins MD   40 mEq at 09/24/18 1119    Or    potassium bicarb-citric acid (EFFER-K) effervescent tablet 40 mEq  40 mEq Oral PRN Edna Higgins MD        Or   Ramone Martínez potassium chloride 10 mEq/100 mL IVPB (Peripheral Line)  10 mEq Intravenous PRN Jaden Bazan MD        mometasone-formoterol (DULERA) 200-5 MCG/ACT inhaler 2 puff  2 puff Inhalation BID Jaden Bazan MD   2 puff at 09/24/18 2892    cefTRIAXone (ROCEPHIN) 1 g in sterile water 10 mL IV syringe  1 g Intravenous Q24H Jaden Bazan MD   1 g at 09/23/18 2100       Past Medical History:   Diagnosis Date    [facility] (congestive heart [facility]) ([facility]     COPD (chronic obstructive pulmonary disease) ([facility]     Depression     Hypertension      History reviewed. No pertinent surgical history. History reviewed. No pertinent family history. Social History     Social History    Marital status:      Spouse name: N/A    Number of children: N/A    Years of education: N/A     Occupational History    Not on file. Social History Main Topics    Smoking status: Never Smoker    Smokeless tobacco: Not on file    Alcohol use No    Drug use: Unknown    Sexual activity: Not on file     Other Topics Concern    Not on file     Social History Narrative    No narrative on file       Review of Systems:   · Constitutional: there has been no unanticipated weight loss.  There's been no change in energy level, sleep pattern, or activity level.     · Eyes: No visual changes or diplopia. No scleral icterus. · ENT: No Headaches, hearing loss or vertigo. No mouth sores or sore throat. · Cardiovascular: Reviewed in HPI  · Respiratory: No cough or wheezing, no sputum production. No hematemesis. · Gastrointestinal: No abdominal pain, appetite loss, blood in stools. No change in bowel or bladder habits. · Genitourinary: No dysuria, trouble voiding, or hematuria. · Musculoskeletal:  No gait disturbance, weakness or joint complaints. · Integumentary: No rash or pruritis. · Neurological: No headache, diplopia, change in muscle strength, numbness or tingling. No change in gait, balance, coordination, mood, affect, memory, mentation, behavior. · Psychiatric: No anxiety, no depression. · Endocrine: No malaise, fatigue or temperature intolerance. No excessive thirst, fluid intake, or urination. No tremor. · Hematologic/Lymphatic: No abnormal bruising or bleeding, blood clots or swollen lymph nodes. · Allergic/Immunologic: No nasal congestion or hives. Physical Examination:    Vitals:    09/24/18 0435 09/24/18 0834 09/24/18 1111 09/24/18 1126   BP: 116/66  (!) 110/58    Pulse: 102  89    Resp: 18 18 19    Temp: 98.1 °F (36.7 °C)  97.5 °F (36.4 °C)    TempSrc: Oral  Temporal    SpO2: 92% 93% 90%    Weight:    241 lb 11.2 oz (109.6 kg)   Height:         Body mass index is 50.52 kg/m².      Wt Readings from Last 3 Encounters:   09/24/18 241 lb 11.2 oz (109.6 kg)     BP Readings from Last 3 Encounters:   09/24/18 (!) 110/58     Constitutional and General Appearance:   Chronically ill appearing  HEENT:  NC/AT  KARLO  No problems with hearing  Skin:  Warm, dry  Respiratory:  · Normal excursion and expansion without use of accessory muscles  · Resp Auscultation: Normal breath sounds without dullness  Cardiovascular:  · The apical impulses not displaced  · Heart tones are crisp and normal  · Cervical veins are not engorged  · The carotid upstroke is normal in amplitude and contour without delay or bruit  JVP elevated to angle of jaw  RRR with nl S1 and S2 without m,r,g  · Peripheral pulses are symmetrical and full  · There is no clubbing, cyanosis of the extremities. · 1+ bilateral edema  · Femoral Arteries: 2+ and equal  · Pedal Pulses: 2+ and equal   Neck:  · No thyromegaly  Abdomen:  · No masses or tenderness  · Liver/Spleen: No Abnormalities Noted  Neurological/Psychiatric:  · Alert and oriented in all spheres  · Moves all extremities well  · Exhibits normal gait balance and coordination  · No abnormalities of mood, affect, memory, mentation, or behavior are noted      Assessment:    1. Dyspnea, unspecified type    2. Acute on chronic congestive heart failure ([address]    3. Hypokalemia    4. Hyponatremia    5. Hypermagnesemia         Plan:  Stop digoxin, not in atrial fib  Start spironolactone 25 mg po qd  Add ACEI/ARB tomorrow after a little more diuresis  Echo  Will need to try and add coreg tomorrow  CHF education reinforced. ~salt restriction  ~fluid restriction  ~medication compliance  ~daily weights and notify of any significant weight gain/loss  ~establish with CHF nurse  ~outpatient follow-up with our CHF team      I appreciate the opportunity of cooperating in the care of this patient.     Michela Sandhu M.D., [facility]

## 2018-09-24 NOTE — FLOWSHEET NOTE
Referral received for information regarding Advance Directives. LW and POAHC forms given and explained. Also provided printed information about the forms and the process. Instructed patient to not sign either form until asking nurse to call for a  to return to help with the witnessing and completing the process. Patient hard of hearing, daughter present also heard instructions, both expressed understanding. Patient and daughter will review forms and make a decision. Daughter is only child and is patient's legal POA. Patient indicated by pointing that her daughter is who she would want to speak for her if she was unable to. Spiritual support offered - no needs at present.

## 2018-09-24 NOTE — PROGRESS NOTES
Nutrition Assessment    Type and Reason for Visit: Consult, Patient Education    Nutrition Assessment:  Subjective Assessment: Consult for CHF diet education. RD spoke with pt's daughter about low na diet and fluid restriction. Provided heart failure diet education. Education included low sodium diet guidelines (3-4 gm Na+/day) and fluid restriction (64 oz/day). Reviewed foods to choose and foods to avoid, along with label reading and ways to add flavor to food. Pt currently tries to follow a low sodium diet at home and does not add salt to food. Pt states understanding of education. Time spent with patient: 15 minutes.      Nutrition Intervention  Nutrition Education/Counseling/Coordination of Care:  Education Completed (CHF 15 mins)    Electronically signed by Valentín Marrero RD, CNSC, LD on 9/24/18 at 11:12 AM    Contact Number: [phone]

## 2018-09-24 NOTE — PROGRESS NOTES
Hospitalist Progress Note      PCP: Wayne Weems DO    Date of Admission: 9/23/2018    Chief Complaint: SOB    Hospital Course: Nate Alonzo is a 80 y.o. female who presented with worsening sob. Onset about few days ago that got worse. Pt with hx of CHF. States saw pcp and was asked to double up pn lasix. Pt symptosm got worse despite lasix. Pt with increase swelling. Sob is worse on exertion and laying flay. Some productive cough. No fever or chills. No n/v/d. Hx of copd     Subjective:   Feeling better today. Will give IV lasix      Medications:  Reviewed    Infusion Medications   Scheduled Medications    spironolactone  25 mg Oral Daily    furosemide  20 mg Intravenous BID    venlafaxine  150 mg Oral Daily    sodium chloride flush  10 mL Intravenous 2 times per day    enoxaparin  40 mg Subcutaneous Daily    mometasone-formoterol  2 puff Inhalation BID    cefTRIAXone (ROCEPHIN) IV  1 g Intravenous Q24H     PRN Meds: sodium chloride flush, magnesium hydroxide, ondansetron, potassium chloride **OR** potassium bicarb-citric acid **OR** potassium chloride      Intake/Output Summary (Last 24 hours) at 09/24/18 1900  Last data filed at 09/24/18 1518   Gross per 24 hour   Intake              270 ml   Output             1450 ml   Net            -1180 ml       Physical Exam Performed:    /69   Pulse 91   Temp 97.6 °F (36.4 °C) (Temporal)   Resp 19   Ht 4' 10\" (1.473 m)   Wt 241 lb 11.2 oz (109.6 kg)   SpO2 94%   BMI 50.52 kg/m²     General appearance:  Appears comfortable. Well nourished  Eyes: Sclera clear, pupils equal  ENT: Moist mucus membranes, no thrush. Trachea midline. Cardiovascular: Regular rhythm, normal S1, S2. No murmur, gallop, rub.  No edema in lower extremities  Respiratory: b/l wheezing with crackels at the base   Gastrointestinal: Abdomen soft, non-tender, not distended, normal bowel sounds  Musculoskeletal: No cyanosis in digits, neck supple  Neurology: Cranial outpatient regimen.   - given 80 of iv lasix in the ER. We will monitor for response today  - hold any further iv lasix today. Start iv lasix tomorrow. - cardiology consulted. - daily weight, low salt diet,   - place cisneros. Monitor output.        UTI  - noted on ua  - treat with rocephin.  - follow up on culture      2, PRABHA  With hyponatremia and hypokalemia  Believe yoko in setting of diuretics and HCTZ and CHF  -replace lytes  - nephrology consulted.      3. Hyponatremia  Likely due to medicatio nand above.      4. COPD  - does have some wheezing.   - will give one dose of steroids  - duonebs.      5. Acute respiratory failure    - due to combination of CHF, copd and possible bronchitis vs pneumonia.  -covered with rocephin.         DVT Prophylaxis: lovenox  Diet: DIET CARDIAC;  Code Status: Full Code    PT/OT Eval Status: eval and treat     Dispo - Rakesh Kyle MD

## 2018-09-25 ENCOUNTER — APPOINTMENT (OUTPATIENT)
Dept: GENERAL RADIOLOGY | Age: 83
DRG: 291 | End: 2018-09-25
Payer: MEDICARE

## 2018-09-25 LAB
ANION GAP SERPL CALCULATED.3IONS-SCNC: 12 MMOL/L (ref 3–16)
BUN BLDV-MCNC: 35 MG/DL (ref 7–20)
CALCIUM SERPL-MCNC: 9.1 MG/DL (ref 8.3–10.6)
CHLORIDE BLD-SCNC: 87 MMOL/L (ref 99–110)
CO2: 34 MMOL/L (ref 21–32)
CREAT SERPL-MCNC: 1.5 MG/DL (ref 0.6–1.2)
GFR AFRICAN AMERICAN: 40
GFR NON-AFRICAN AMERICAN: 33
GLUCOSE BLD-MCNC: 124 MG/DL (ref 70–99)
LV EF: 63 %
LVEF MODALITY: NORMAL
MAGNESIUM: 2.7 MG/DL (ref 1.8–2.4)
POTASSIUM SERPL-SCNC: 3.2 MMOL/L (ref 3.5–5.1)
SODIUM BLD-SCNC: 131 MMOL/L (ref 136–145)
SODIUM BLD-SCNC: 133 MMOL/L (ref 136–145)
SODIUM BLD-SCNC: 135 MMOL/L (ref 136–145)
SODIUM BLD-SCNC: 135 MMOL/L (ref 136–145)
SODIUM BLD-SCNC: 136 MMOL/L (ref 136–145)

## 2018-09-25 PROCEDURE — 99232 SBSQ HOSP IP/OBS MODERATE 35: CPT | Performed by: INTERNAL MEDICINE

## 2018-09-25 PROCEDURE — 71045 X-RAY EXAM CHEST 1 VIEW: CPT

## 2018-09-25 PROCEDURE — 94640 AIRWAY INHALATION TREATMENT: CPT

## 2018-09-25 PROCEDURE — 93306 TTE W/DOPPLER COMPLETE: CPT

## 2018-09-25 PROCEDURE — 6370000000 HC RX 637 (ALT 250 FOR IP): Performed by: INTERNAL MEDICINE

## 2018-09-25 PROCEDURE — 2700000000 HC OXYGEN THERAPY PER DAY

## 2018-09-25 PROCEDURE — 83735 ASSAY OF MAGNESIUM: CPT

## 2018-09-25 PROCEDURE — 2500000003 HC RX 250 WO HCPCS: Performed by: HOSPITALIST

## 2018-09-25 PROCEDURE — 94664 DEMO&/EVAL PT USE INHALER: CPT

## 2018-09-25 PROCEDURE — 2580000003 HC RX 258: Performed by: HOSPITALIST

## 2018-09-25 PROCEDURE — 6370000000 HC RX 637 (ALT 250 FOR IP): Performed by: HOSPITALIST

## 2018-09-25 PROCEDURE — 84295 ASSAY OF SERUM SODIUM: CPT

## 2018-09-25 PROCEDURE — 80048 BASIC METABOLIC PNL TOTAL CA: CPT

## 2018-09-25 PROCEDURE — 2060000000 HC ICU INTERMEDIATE R&B

## 2018-09-25 PROCEDURE — 6360000002 HC RX W HCPCS: Performed by: HOSPITALIST

## 2018-09-25 PROCEDURE — 6360000002 HC RX W HCPCS: Performed by: INTERNAL MEDICINE

## 2018-09-25 PROCEDURE — 36415 COLL VENOUS BLD VENIPUNCTURE: CPT

## 2018-09-25 PROCEDURE — 94760 N-INVAS EAR/PLS OXIMETRY 1: CPT

## 2018-09-25 PROCEDURE — 6370000000 HC RX 637 (ALT 250 FOR IP)

## 2018-09-25 RX ORDER — BENZONATATE 100 MG/1
100 CAPSULE ORAL 3 TIMES DAILY PRN
Status: DISCONTINUED | OUTPATIENT
Start: 2018-09-25 | End: 2018-09-27 | Stop reason: HOSPADM

## 2018-09-25 RX ORDER — POTASSIUM CHLORIDE 20 MEQ/1
40 TABLET, EXTENDED RELEASE ORAL EVERY 4 HOURS
Status: COMPLETED | OUTPATIENT
Start: 2018-09-25 | End: 2018-09-25

## 2018-09-25 RX ORDER — IPRATROPIUM BROMIDE AND ALBUTEROL SULFATE 2.5; .5 MG/3ML; MG/3ML
1 SOLUTION RESPIRATORY (INHALATION) EVERY 4 HOURS PRN
Status: DISCONTINUED | OUTPATIENT
Start: 2018-09-25 | End: 2018-09-27 | Stop reason: HOSPADM

## 2018-09-25 RX ORDER — IPRATROPIUM BROMIDE AND ALBUTEROL SULFATE 2.5; .5 MG/3ML; MG/3ML
SOLUTION RESPIRATORY (INHALATION)
Status: COMPLETED
Start: 2018-09-25 | End: 2018-09-25

## 2018-09-25 RX ORDER — IPRATROPIUM BROMIDE AND ALBUTEROL SULFATE 2.5; .5 MG/3ML; MG/3ML
1 SOLUTION RESPIRATORY (INHALATION) 2 TIMES DAILY
Status: DISCONTINUED | OUTPATIENT
Start: 2018-09-25 | End: 2018-09-27 | Stop reason: HOSPADM

## 2018-09-25 RX ADMIN — IPRATROPIUM BROMIDE AND ALBUTEROL SULFATE 1 AMPULE: .5; 3 SOLUTION RESPIRATORY (INHALATION) at 08:22

## 2018-09-25 RX ADMIN — POTASSIUM CHLORIDE 40 MEQ: 1500 TABLET, EXTENDED RELEASE ORAL at 07:57

## 2018-09-25 RX ADMIN — IPRATROPIUM BROMIDE AND ALBUTEROL SULFATE 1 AMPULE: .5; 3 SOLUTION RESPIRATORY (INHALATION) at 00:43

## 2018-09-25 RX ADMIN — POTASSIUM CHLORIDE 40 MEQ: 1500 TABLET, EXTENDED RELEASE ORAL at 12:50

## 2018-09-25 RX ADMIN — FUROSEMIDE 20 MG: 10 INJECTION, SOLUTION INTRAMUSCULAR; INTRAVENOUS at 07:57

## 2018-09-25 RX ADMIN — BENZONATATE 100 MG: 100 CAPSULE ORAL at 12:50

## 2018-09-25 RX ADMIN — Medication 10 ML: at 22:59

## 2018-09-25 RX ADMIN — Medication 2 PUFF: at 08:22

## 2018-09-25 RX ADMIN — ENOXAPARIN SODIUM 40 MG: 40 INJECTION SUBCUTANEOUS at 07:58

## 2018-09-25 RX ADMIN — IPRATROPIUM BROMIDE AND ALBUTEROL SULFATE 1 AMPULE: 2.5; .5 SOLUTION RESPIRATORY (INHALATION) at 00:43

## 2018-09-25 RX ADMIN — IPRATROPIUM BROMIDE AND ALBUTEROL SULFATE 1 AMPULE: .5; 3 SOLUTION RESPIRATORY (INHALATION) at 19:41

## 2018-09-25 RX ADMIN — SPIRONOLACTONE 25 MG: 25 TABLET ORAL at 07:57

## 2018-09-25 RX ADMIN — BENZONATATE 100 MG: 100 CAPSULE ORAL at 22:02

## 2018-09-25 RX ADMIN — CEFTRIAXONE SODIUM 1 G: 10 INJECTION, POWDER, FOR SOLUTION INTRAVENOUS at 22:59

## 2018-09-25 RX ADMIN — Medication 10 ML: at 07:58

## 2018-09-25 RX ADMIN — Medication 2 PUFF: at 19:41

## 2018-09-25 RX ADMIN — VENLAFAXINE HYDROCHLORIDE 150 MG: 150 CAPSULE, EXTENDED RELEASE ORAL at 07:58

## 2018-09-25 ASSESSMENT — PAIN SCALES - GENERAL
PAINLEVEL_OUTOF10: 0

## 2018-09-25 NOTE — PROGRESS NOTES
[facility] Renal Note    Patient Active Problem List   Diagnosis    Acute diastolic heart failure ([facility]    HTN (hypertension)    Hyponatremia    PRABHA (acute kidney injury) ([facility]    Hypokalemia     Past Medical History:   has a past medical history of CHF (congestive heart failure) ([facility] COPD (chronic obstructive pulmonary disease) ([facility] Depression; and Hypertension. Past Social History:   reports that she has never smoked. She does not have any smokeless tobacco history on file. She reports that she does not drink alcohol. Subjective:  Edema better    A comprehensive review of systems was negative except as noted above. Objective:      Vitals:    09/25/18 0630 09/25/18 0752 09/25/18 0826 09/25/18 0909   BP: (!) 125/59   111/62   Pulse: 102   108   Resp: 20  20 20   Temp: 98.3 °F (36.8 °C)      TempSrc: Oral      SpO2: 92%  90% 93%   Weight:  245 lb 8 oz (111.4 kg)     Height:         I/O last 3 completed shifts:   In: 150 [P.O.:150]  Out: 1150 [Urine:1150]  I/O this shift:  In: 220 [P.O.:220]  Out: -     Chest- clear  Heart-regular  Abd-soft  Ext- decreased edema    Labs  Hemoglobin   Date Value Ref Range Status   09/23/2018 12.8 12.0 - 16.0 g/dL Final     Hematocrit   Date Value Ref Range Status   09/23/2018 37.5 36.0 - 48.0 % Final     WBC   Date Value Ref Range Status   09/23/2018 11.1 (H) 4.0 - 11.0 K/uL Final     Platelets   Date Value Ref Range Status   09/23/2018 324 135 - 450 K/uL Final     Lab Results   Component Value Date    CREATININE 1.5 (H) 09/25/2018    BUN 35 (H) 09/25/2018     (L) 09/25/2018    K 3.2 (L) 09/25/2018    CL 87 (L) 09/25/2018    CO2 34 (H) 09/25/2018   Uosm-289  Librado-55    IMPRESSION/RECOMMENDATIONS:    Hyponatremia : Acute, Mild 129 SNa, asymptomatic:  - No Reported Severe symptoms: seizures, obtundation, coma, and respiratory arrest.  - Risk factors for hyponatremia: HCTZ use, PRABHA pre-renal w/ on-going lasix, HCTZ, Triamterene use, + ? pneumonia  - Patient is at low risk of developing Osmotic Demyelination Syndrome.     Work up:  - Serum Osm , UOsm , Librado , Urine K, Uric acid,     SNa now better. Rate of correction acceptable.      Management:  - Monitor Serum Na. On Lasix 20mg IV bid.   - K - hypokalemia due to diuretics - needing aggressive repletion. Replace. On Aldosterone antagonist.   (Potassium is osmotically active as sodium, so giving K+ can raise the S [Na+] and osmolality in hyponatremic patients)   - Edema - consider switching to po Lasix to help guide outpt dosing  - Strict I/O with daily weights. PRABHA probably due to decreased renal perfusion.   Crea better.          Other problems:      Patient Active Problem List   Diagnosis    Acute diastolic heart failure ([address]    HTN (hypertension)    Hyponatremia    PRABHA (acute kidney injury) ([address]    Hypokalemia        Krystal Guillen

## 2018-09-25 NOTE — PROGRESS NOTES
Pm assessment complete, medications given. No complaints of pain or other needs at this time. Call light within reach. Pt resting comfortably at this time. Will continue to monitor

## 2018-09-25 NOTE — PROGRESS NOTES
[address]   Progress Note  Cardiology    CC: Dyspnea, possible CHF    HPI:    She is without specific complaints. Denies dyspnea. \"I just don't feel well\"    Medications/Labs all Reviewed    Lab Results   Component Value Date    WBC 11.1 (H) 09/23/2018    HGB 12.8 09/23/2018    HCT 37.5 09/23/2018    MCV 88.0 09/23/2018     09/23/2018     Lab Results   Component Value Date    CREATININE 1.5 (H) 09/25/2018    BUN 35 (H) 09/25/2018     (L) 09/25/2018    K 3.2 (L) 09/25/2018    CL 87 (L) 09/25/2018    CO2 34 (H) 09/25/2018     No results found for: INR, PROTIME     Physical Examination:    /62   Pulse 108   Temp 98.3 °F (36.8 °C) (Oral)   Resp 20   Ht 4' 10\" (1.473 m)   Wt 245 lb 8 oz (111.4 kg)   SpO2 93%   BMI 51.31 kg/m²      Respiratory:  · Resp Assessment: Normal respiratory effort  · Resp Auscultation: Basilar crackles bilaterally   Cardiovascular:  · Auscultation: regular rate and rhythm, normal S1S2, no murmur, rub or gallop  · Palpation:  Nl PMI  · JVP:  normal  · Extremities: No Edema  Abdomen:  · Soft, non-tender  · Normal bowel sounds  Extremities:  ·  No Cyanosis or Clubbing  Neurological/Psychiatric:  · Oriented to time, place, and person  · Non-anxious  Skin:   · Warm and dry      Assessment:       Acute diastolic heart failure ([facility] (9/23/2018)    Assessment: ECHO pending. CHF teaching. Continue lasix and follow labs. Replace K+    Plan: As above   HTN (hypertension) (9/23/2018)    Assessment: controlled    Plan: follow      PRABHA (acute kidney injury) ([facility] (9/23/2018)    Assessment: slightly improved    Plan: follow    Awaiting ECHO for further recs. Apparently order was not placed so I ordered.       Guerrero Rizo MD, 9/25/2018 10:53 AM General

## 2018-09-25 NOTE — PROGRESS NOTES
Patient is coughing a lot this am lungs sound junky will ask to send sample. Sitting up in the chair at this time.

## 2018-09-26 LAB
ANION GAP SERPL CALCULATED.3IONS-SCNC: 12 MMOL/L (ref 3–16)
BUN BLDV-MCNC: 28 MG/DL (ref 7–20)
CALCIUM SERPL-MCNC: 9 MG/DL (ref 8.3–10.6)
CHLORIDE BLD-SCNC: 90 MMOL/L (ref 99–110)
CO2: 32 MMOL/L (ref 21–32)
CREAT SERPL-MCNC: 1.4 MG/DL (ref 0.6–1.2)
GFR AFRICAN AMERICAN: 43
GFR NON-AFRICAN AMERICAN: 36
GLUCOSE BLD-MCNC: 126 MG/DL (ref 70–99)
GLUCOSE BLD-MCNC: 138 MG/DL (ref 70–99)
GLUCOSE BLD-MCNC: 96 MG/DL (ref 70–99)
MAGNESIUM: 2.8 MG/DL (ref 1.8–2.4)
PERFORMED ON: ABNORMAL
PERFORMED ON: NORMAL
POTASSIUM SERPL-SCNC: 4.6 MMOL/L (ref 3.5–5.1)
PRO-BNP: 747 PG/ML (ref 0–449)
SODIUM BLD-SCNC: 132 MMOL/L (ref 136–145)
SODIUM BLD-SCNC: 133 MMOL/L (ref 136–145)
SODIUM BLD-SCNC: 134 MMOL/L (ref 136–145)
SODIUM BLD-SCNC: 134 MMOL/L (ref 136–145)

## 2018-09-26 PROCEDURE — 2580000003 HC RX 258: Performed by: HOSPITALIST

## 2018-09-26 PROCEDURE — 6360000002 HC RX W HCPCS: Performed by: HOSPITALIST

## 2018-09-26 PROCEDURE — 80048 BASIC METABOLIC PNL TOTAL CA: CPT

## 2018-09-26 PROCEDURE — 2700000000 HC OXYGEN THERAPY PER DAY

## 2018-09-26 PROCEDURE — 6360000002 HC RX W HCPCS: Performed by: INTERNAL MEDICINE

## 2018-09-26 PROCEDURE — 94640 AIRWAY INHALATION TREATMENT: CPT

## 2018-09-26 PROCEDURE — 2060000000 HC ICU INTERMEDIATE R&B

## 2018-09-26 PROCEDURE — 36415 COLL VENOUS BLD VENIPUNCTURE: CPT

## 2018-09-26 PROCEDURE — 6370000000 HC RX 637 (ALT 250 FOR IP): Performed by: INTERNAL MEDICINE

## 2018-09-26 PROCEDURE — 94760 N-INVAS EAR/PLS OXIMETRY 1: CPT

## 2018-09-26 PROCEDURE — 84295 ASSAY OF SERUM SODIUM: CPT

## 2018-09-26 PROCEDURE — 83735 ASSAY OF MAGNESIUM: CPT

## 2018-09-26 PROCEDURE — 6370000000 HC RX 637 (ALT 250 FOR IP): Performed by: HOSPITALIST

## 2018-09-26 PROCEDURE — 83880 ASSAY OF NATRIURETIC PEPTIDE: CPT

## 2018-09-26 RX ORDER — FUROSEMIDE 40 MG/1
40 TABLET ORAL 2 TIMES DAILY
Status: DISCONTINUED | OUTPATIENT
Start: 2018-09-26 | End: 2018-09-27 | Stop reason: HOSPADM

## 2018-09-26 RX ADMIN — VENLAFAXINE HYDROCHLORIDE 150 MG: 150 CAPSULE, EXTENDED RELEASE ORAL at 08:32

## 2018-09-26 RX ADMIN — FUROSEMIDE 20 MG: 10 INJECTION, SOLUTION INTRAMUSCULAR; INTRAVENOUS at 06:46

## 2018-09-26 RX ADMIN — Medication 10 ML: at 21:09

## 2018-09-26 RX ADMIN — SPIRONOLACTONE 25 MG: 25 TABLET ORAL at 08:32

## 2018-09-26 RX ADMIN — Medication 2 PUFF: at 21:28

## 2018-09-26 RX ADMIN — ENOXAPARIN SODIUM 40 MG: 40 INJECTION SUBCUTANEOUS at 08:33

## 2018-09-26 RX ADMIN — Medication 10 ML: at 08:42

## 2018-09-26 RX ADMIN — IPRATROPIUM BROMIDE AND ALBUTEROL SULFATE 1 AMPULE: .5; 3 SOLUTION RESPIRATORY (INHALATION) at 07:58

## 2018-09-26 RX ADMIN — BENZONATATE 100 MG: 100 CAPSULE ORAL at 04:57

## 2018-09-26 RX ADMIN — Medication 2 PUFF: at 07:58

## 2018-09-26 RX ADMIN — IPRATROPIUM BROMIDE AND ALBUTEROL SULFATE 1 AMPULE: .5; 3 SOLUTION RESPIRATORY (INHALATION) at 21:28

## 2018-09-26 RX ADMIN — FUROSEMIDE 40 MG: 40 TABLET ORAL at 16:45

## 2018-09-26 ASSESSMENT — PAIN SCALES - GENERAL: PAINLEVEL_OUTOF10: 0

## 2018-09-26 NOTE — PLAN OF CARE
Problem: Falls - Risk of:  Goal: Absence of physical injury  Absence of physical injury   Pt remains in bed with fall socks, alarm on bed, and sign posted. No falls to date.  Will continue to monitor

## 2018-09-26 NOTE — PLAN OF CARE
Problem: OXYGENATION/RESPIRATORY FUNCTION  Goal: Patient will achieve/maintain normal respiratory rate/effort  Respiratory rate and effort will be within normal limits for the patient   Outcome: Ongoing  Patient's O2 status has remained stable. 94% on 1.5 liters. Respiratory rate within normal range.

## 2018-09-26 NOTE — PLAN OF CARE
Problem: Falls - Risk of:  Goal: Will remain free from falls  Will remain free from falls   Outcome: Ongoing  Patient has remained free of falls. Bed alarm in place. Call light in reach. Appropriately calls out.

## 2018-09-26 NOTE — PROGRESS NOTES
[facility] Renal Note    Patient Active Problem List   Diagnosis    Acute diastolic heart failure ([facility]    HTN (hypertension)    Hyponatremia    PRABHA (acute kidney injury) ([facility]    Hypokalemia     Past Medical History:   has a past medical history of CHF (congestive heart failure) ([facility] COPD (chronic obstructive pulmonary disease) ([facility] Depression; and Hypertension. Past Social History:   reports that she has never smoked. She does not have any smokeless tobacco history on file. She reports that she does not drink alcohol. Subjective:  Edema better    A comprehensive review of systems was negative except as noted above. Objective:      Vitals:    09/26/18 0445 09/26/18 0744 09/26/18 0759 09/26/18 0826   BP: 130/66   (!) 125/59   Pulse: 101   108   Resp: 20 18 18   Temp: 99 °F (37.2 °C)   97.9 °F (36.6 °C)   TempSrc: Oral   Oral   SpO2: 95%  93% (!) 89%   Weight:  244 lb 3.2 oz (110.8 kg)     Height:         I/O last 3 completed shifts:   In: 763 [P.O.:765]  Out: 1150 [Urine:1150]  I/O this shift:  In: -   Out: 300 [Urine:300]    Chest- clear  Heart-regular  Abd-soft  Ext- decreased edema    Labs  Hemoglobin   Date Value Ref Range Status   09/23/2018 12.8 12.0 - 16.0 g/dL Final     Hematocrit   Date Value Ref Range Status   09/23/2018 37.5 36.0 - 48.0 % Final     WBC   Date Value Ref Range Status   09/23/2018 11.1 (H) 4.0 - 11.0 K/uL Final     Platelets   Date Value Ref Range Status   09/23/2018 324 135 - 450 K/uL Final     Lab Results   Component Value Date    CREATININE 1.4 (H) 09/26/2018    BUN 28 (H) 09/26/2018     (L) 09/26/2018    K 4.6 09/26/2018    CL 90 (L) 09/26/2018    CO2 32 09/26/2018   Uosm-289  Librado-55    IMPRESSION/RECOMMENDATIONS:    Hyponatremia : Acute, Mild 129 SNa, asymptomatic:  - No Reported Severe symptoms: seizures, obtundation, coma, and respiratory arrest.  - Risk factors for hyponatremia: HCTZ use, PRABHA pre-renal w/ on-going lasix, HCTZ, Triamterene use, + ? pneumonia  - Patient is at low risk of developing Osmotic Demyelination Syndrome.     Work up:  - Serum Osm , UOsm , Librado , Urine K, Uric acid,     SNa now better. Rate of correction acceptable.      Management:  - Monitor Serum Na. Switch to po Lasix. - K - hypokalemia due to diuretics - needing aggressive repletion. Replaced. On Aldosterone antagonist.   (Potassium is osmotically active as sodium, so giving K+ can raise the S [Na+] and osmolality in hyponatremic patients)   - Edema - po Lasix  - Strict I/O with daily weights. PRABHA probably due to decreased renal perfusion.   Crea better.          Other problems:      Patient Active Problem List   Diagnosis    Acute diastolic heart failure ([address]    HTN (hypertension)    Hyponatremia    PRABHA (acute kidney injury) ([address]    Hypokalemia        Krystal Amezquita

## 2018-09-26 NOTE — PROGRESS NOTES
[address]   Progress Note  Cardiology    CC: Dyspnea, possible CHF    HPI:    Cough today. Breathing overall better. ECHO with normal LV function and normal filling pressure    Medications/Labs all Reviewed    Lab Results   Component Value Date    WBC 11.1 (H) 09/23/2018    HGB 12.8 09/23/2018    HCT 37.5 09/23/2018    MCV 88.0 09/23/2018     09/23/2018     Lab Results   Component Value Date    CREATININE 1.4 (H) 09/26/2018    BUN 28 (H) 09/26/2018     (L) 09/26/2018    K 4.6 09/26/2018    CL 90 (L) 09/26/2018    CO2 32 09/26/2018     No results found for: INR, PROTIME     Physical Examination:    BP (!) 125/59   Pulse 108   Temp 97.9 °F (36.6 °C) (Oral)   Resp 18   Ht 4' 10\" (1.473 m)   Wt 244 lb 3.2 oz (110.8 kg)   SpO2 (!) 89%   BMI 51.04 kg/m²      Respiratory:  · Resp Assessment: Normal respiratory effort  · Resp Auscultation: Mild wheezing bilaterally   Cardiovascular:  · Auscultation: regular rate and rhythm, normal S1S2, no murmur, rub or gallop  · Palpation:  Nl PMI  · JVP:  normal  · Extremities: No Edema  Abdomen:  · Soft, non-tender  · Normal bowel sounds  Extremities:  ·  No Cyanosis or Clubbing  Neurological/Psychiatric:  · Oriented to time, place, and person  · Non-anxious  Skin:   · Warm and dry      Assessment:       Acute diastolic heart failure (HCC) (9/23/2018)    Assessment:Normal LV function with normal filling pressure. Compensated    Plan: Continue po lasix     HTN (hypertension) (9/23/2018)    Assessment: controlled    Plan: follow      PRABHA (acute kidney injury) ([facility] (9/23/2018)    Assessment: Improving daily    Plan: follow    Dyspnea:  CHF seems compensated.  At this point breathing issues appear related to lungs  Rx per Francois Jimenez MD, 9/26/2018 9:25 AM

## 2018-09-27 VITALS
RESPIRATION RATE: 16 BRPM | HEART RATE: 108 BPM | WEIGHT: 245.2 LBS | HEIGHT: 58 IN | OXYGEN SATURATION: 94 % | DIASTOLIC BLOOD PRESSURE: 59 MMHG | BODY MASS INDEX: 51.47 KG/M2 | SYSTOLIC BLOOD PRESSURE: 104 MMHG | TEMPERATURE: 97.9 F

## 2018-09-27 LAB
ANION GAP SERPL CALCULATED.3IONS-SCNC: 8 MMOL/L (ref 3–16)
BUN BLDV-MCNC: 27 MG/DL (ref 7–20)
CALCIUM SERPL-MCNC: 9.2 MG/DL (ref 8.3–10.6)
CHLORIDE BLD-SCNC: 92 MMOL/L (ref 99–110)
CO2: 36 MMOL/L (ref 21–32)
CREAT SERPL-MCNC: 1.5 MG/DL (ref 0.6–1.2)
EKG ATRIAL RATE: 104 BPM
EKG DIAGNOSIS: NORMAL
EKG P AXIS: 50 DEGREES
EKG P-R INTERVAL: 158 MS
EKG Q-T INTERVAL: 368 MS
EKG QRS DURATION: 78 MS
EKG QTC CALCULATION (BAZETT): 483 MS
EKG R AXIS: 13 DEGREES
EKG T AXIS: 14 DEGREES
EKG VENTRICULAR RATE: 104 BPM
GFR AFRICAN AMERICAN: 40
GFR NON-AFRICAN AMERICAN: 33
GLUCOSE BLD-MCNC: 113 MG/DL (ref 70–99)
MAGNESIUM: 2.6 MG/DL (ref 1.8–2.4)
POTASSIUM SERPL-SCNC: 4.7 MMOL/L (ref 3.5–5.1)
SODIUM BLD-SCNC: 136 MMOL/L (ref 136–145)

## 2018-09-27 PROCEDURE — G8987 SELF CARE CURRENT STATUS: HCPCS

## 2018-09-27 PROCEDURE — 6370000000 HC RX 637 (ALT 250 FOR IP): Performed by: INTERNAL MEDICINE

## 2018-09-27 PROCEDURE — 97161 PT EVAL LOW COMPLEX 20 MIN: CPT

## 2018-09-27 PROCEDURE — 6360000002 HC RX W HCPCS: Performed by: HOSPITALIST

## 2018-09-27 PROCEDURE — 99231 SBSQ HOSP IP/OBS SF/LOW 25: CPT | Performed by: INTERNAL MEDICINE

## 2018-09-27 PROCEDURE — G8988 SELF CARE GOAL STATUS: HCPCS

## 2018-09-27 PROCEDURE — 90670 PCV13 VACCINE IM: CPT | Performed by: INTERNAL MEDICINE

## 2018-09-27 PROCEDURE — G8979 MOBILITY GOAL STATUS: HCPCS

## 2018-09-27 PROCEDURE — 6360000002 HC RX W HCPCS: Performed by: INTERNAL MEDICINE

## 2018-09-27 PROCEDURE — 36415 COLL VENOUS BLD VENIPUNCTURE: CPT

## 2018-09-27 PROCEDURE — 97530 THERAPEUTIC ACTIVITIES: CPT

## 2018-09-27 PROCEDURE — 80048 BASIC METABOLIC PNL TOTAL CA: CPT

## 2018-09-27 PROCEDURE — 97535 SELF CARE MNGMENT TRAINING: CPT

## 2018-09-27 PROCEDURE — 83735 ASSAY OF MAGNESIUM: CPT

## 2018-09-27 PROCEDURE — 97165 OT EVAL LOW COMPLEX 30 MIN: CPT

## 2018-09-27 PROCEDURE — 2580000003 HC RX 258: Performed by: HOSPITALIST

## 2018-09-27 PROCEDURE — 94760 N-INVAS EAR/PLS OXIMETRY 1: CPT

## 2018-09-27 PROCEDURE — G8978 MOBILITY CURRENT STATUS: HCPCS

## 2018-09-27 PROCEDURE — 94680 O2 UPTK RST&XERS DIR SIMPLE: CPT

## 2018-09-27 PROCEDURE — 94640 AIRWAY INHALATION TREATMENT: CPT

## 2018-09-27 PROCEDURE — 6370000000 HC RX 637 (ALT 250 FOR IP): Performed by: HOSPITALIST

## 2018-09-27 PROCEDURE — 2700000000 HC OXYGEN THERAPY PER DAY

## 2018-09-27 PROCEDURE — G0009 ADMIN PNEUMOCOCCAL VACCINE: HCPCS | Performed by: INTERNAL MEDICINE

## 2018-09-27 RX ORDER — BUDESONIDE AND FORMOTEROL FUMARATE DIHYDRATE 160; 4.5 UG/1; UG/1
2 AEROSOL RESPIRATORY (INHALATION) 2 TIMES DAILY
Qty: 1 INHALER | Refills: 3 | Status: SHIPPED | OUTPATIENT
Start: 2018-09-27

## 2018-09-27 RX ORDER — POTASSIUM CHLORIDE 20 MEQ/1
20 TABLET, EXTENDED RELEASE ORAL DAILY
Qty: 30 TABLET | Refills: 1 | Status: SHIPPED | OUTPATIENT
Start: 2018-09-27 | End: 2018-10-10 | Stop reason: ALTCHOICE

## 2018-09-27 RX ORDER — SPIRONOLACTONE 25 MG/1
25 TABLET ORAL DAILY
Qty: 30 TABLET | Refills: 3 | Status: SHIPPED | OUTPATIENT
Start: 2018-09-28 | End: 2018-10-10 | Stop reason: SDUPTHER

## 2018-09-27 RX ORDER — ALBUTEROL SULFATE 90 UG/1
2 AEROSOL, METERED RESPIRATORY (INHALATION) EVERY 6 HOURS PRN
Qty: 1 INHALER | Refills: 3 | Status: SHIPPED | OUTPATIENT
Start: 2018-09-27

## 2018-09-27 RX ADMIN — Medication 2 PUFF: at 09:09

## 2018-09-27 RX ADMIN — PNEUMOCOCCAL 13-VALENT CONJUGATE VACCINE 0.5 ML: 2.2; 2.2; 2.2; 2.2; 2.2; 4.4; 2.2; 2.2; 2.2; 2.2; 2.2; 2.2; 2.2 INJECTION, SUSPENSION INTRAMUSCULAR at 11:44

## 2018-09-27 RX ADMIN — FUROSEMIDE 40 MG: 40 TABLET ORAL at 17:00

## 2018-09-27 RX ADMIN — SPIRONOLACTONE 25 MG: 25 TABLET ORAL at 09:58

## 2018-09-27 RX ADMIN — ENOXAPARIN SODIUM 40 MG: 40 INJECTION SUBCUTANEOUS at 09:58

## 2018-09-27 RX ADMIN — IPRATROPIUM BROMIDE AND ALBUTEROL SULFATE 1 AMPULE: .5; 3 SOLUTION RESPIRATORY (INHALATION) at 09:09

## 2018-09-27 RX ADMIN — VENLAFAXINE HYDROCHLORIDE 150 MG: 150 CAPSULE, EXTENDED RELEASE ORAL at 09:58

## 2018-09-27 RX ADMIN — FUROSEMIDE 40 MG: 40 TABLET ORAL at 09:58

## 2018-09-27 RX ADMIN — Medication 10 ML: at 09:59

## 2018-09-27 ASSESSMENT — PAIN SCALES - GENERAL
PAINLEVEL_OUTOF10: 0
PAINLEVEL_OUTOF10: 0

## 2018-09-27 NOTE — PROGRESS NOTES
Occupational Therapy   Occupational Therapy Initial Assessment  Date: 2018   Patient Name: Manolo De  MRN: [id number]     : 1931    Date of Service: 2018    Discharge Recommendations:Karly Goss scored a 17/24 on the AM-PAC ADL Inpatient form. Current research shows that an AM-PAC score of 18 or greater is typically associated with a discharge to the patient's home setting. Based on the patients AM-PAC score and their current ADL deficits, it is recommended that the patient have 2-3 sessions per week of Occupational Therapy at d/c to increase the patients independence. HOME HEALTH CARE: LEVEL 3 SAFETY     - Initial home health evaluation to occur within 24-48 hours, in patient home   - Therapy evaluations in home within 24-48 hours of discharge; including DME and home safety   - Frontload therapy 5 days, then 3x a week   - Therapy to evaluate if patient has [address] needs for personal care   -  evaluation within 24-48 hours, includes evaluation of resources and insurance to determine AL, [place], LTC, and Medicaid options        OT Equipment Recommendations  Equipment Needed: No  Other: Patient has needed DME and AE at home      Patient Diagnosis(es): The primary encounter diagnosis was Dyspnea, unspecified type. Diagnoses of Acute on chronic systolic congestive heart failure ([address] Hypokalemia, Hyponatremia, and Hypermagnesemia were also pertinent to this visit. has a past medical history of CHF (congestive heart failure) ([address] COPD (chronic obstructive pulmonary disease) ([address] Depression; and Hypertension. has no past surgical history on file.     Treatment Diagnosis: Decreased ADLs, transfers, mobility associated with Acute diastolic heart failure      Restrictions  Restrictions/Precautions  Restrictions/Precautions: Fall Risk (High fall risk)  Position Activity Restriction  Other position/activity restrictions: 72-year-old female with a history of CHF and COPD who presents the emergency department with worsening shortness of breath and edema. This is been ongoing for some time but it has been progressively worse. She did see her doctor on Friday who increased her Lasix from 40 mg once a day to 40 mg twice a day. Despite this she has not seen any improvement. She has not taken either of these medications today. She continues to have worsening shortness of breath and coughing. She is coughing up clear sputum. No fevers no chills no chest pain no diaphoresis. No headaches numbness tingling or paresthesias. The shortness of breath is worse with activity. Subjective   General  Chart Reviewed: Yes  Family / Caregiver Present: Yes (Daughter)  Diagnosis: Acute diastolic heart failure  Subjective  Subjective: Patient in supine, agreeable to evaluation  Pain Assessment  Patient Currently in Pain: Denies  Pain Assessment: 0-10  Pain Level: 0  Oxygen Therapy  SpO2: 90 %  Pulse Oximeter Device Mode: Intermittent  Pulse Oximeter Device Location: Right;Finger  O2 Device: Nasal cannula  O2 Flow Rate (L/min): 1.5 L/min  Social/Functional History  Social/Functional History  Lives With: Family (grandson and granddaughter in law)  Type of Home: House  Home Layout: One level  Home Access: Stairs to enter without rails  Entrance Stairs - Number of Steps: 1 small curb step  Bathroom Shower/Tub: Tub/Shower unit  Bathroom Toilet: Standard  Bathroom Equipment: Shower chair, Grab bars in shower  Home Equipment: Flora Gonzalez, 4 wheeled walker, Reacher  Receives Help From: Family  ADL Assistance:  (daughter assists with transfers and provides supervision during shower)  Active : No  Additional Comments: amb very short distances at home (room to room) without AD, uses SPC when out of house.  has 24 hour assistance at home from family. no recent falls.   normally sleeps in a recliner       Objective   Vision: Impaired  Vision Exceptions: Wears glasses at all times  Hearing: Exceptions to WFL  Hearing Exceptions: Hard of hearing/hearing concerns    Orientation  Overall Orientation Status: Impaired  Orientation Level: Oriented to person;Oriented to place;Oriented to time;Disoriented to situation     Balance  Sitting Balance: Supervision  Standing Balance: Stand by assistance (CGA progressing quickly to SBA with RW)  Standing Balance  Time: ~4-6 minutes total  Activity: 2-3 reps stance during bathing, dressing activities, transitioning to functional mobility with RW around foot of bed to recliner (~10-12 feet)  Sit to stand: Contact guard assistance (min to CGA from hospital bed)  Stand to sit: Contact guard assistance  Wheelchair Bed Transfers  Wheelchair/Bed - Technique: Ambulating  Equipment Used: Bed;Other (recliner)  Level of Asssistance: Stand by assistance;Contact guard assistance  ADL  Feeding: Setup  Grooming: Minimal assistance; Moderate assistance (hair combing and styling)  UE Bathing: Stand by assistance;Setup  LE Bathing: Setup; Moderate assistance;Maximum assistance (posterior periarea, BLEs below the knees)  UE Dressing: Setup;Minimal assistance  LE Dressing: Maximum assistance (donning B socks)  Toileting: Dependent/Total (Catheter present, assist to clean periarea for smear of stool)  Additional Comments: ADLs performed seated EOB  Tone RUE  RUE Tone: Normotonic  Tone LUE  LUE Tone: Normotonic  Coordination  Movements Are Fluid And Coordinated: Yes     Bed mobility  Rolling to Left: Stand by assistance  Supine to Sit: Stand by assistance  Sit to Supine: Unable to assess (Patient remained OOB end of evaluation)  Scooting: Stand by assistance  Comment: HOB elevated, use of side rail--patient normally sleeps in recliner at home  Transfers  Sit to stand: Contact guard assistance (min to CGA from hospital bed)  Stand to sit: Contact guard assistance  Vision - Basic Assessment  Patient Visual Report: No visual complaint reported.   Cognition  Overall Cognitive Status: [facility]  Perception  Overall Perceptual Status: WFL     Sensation  Overall Sensation Status: WFL        LUE AROM (degrees)  LUE AROM : WFL  RUE AROM (degrees)  RUE AROM : WFL  LUE Strength  L Hand Grasp: 4-/5  RUE Strength  R Hand Grasp: 4-/5                  Assessment   Performance deficits / Impairments: Decreased functional mobility ; Decreased ADL status; Decreased endurance;Decreased balance  Assessment: Patient presents with the above deficits, which are below baseline, and would benefit from continued OT to address  Treatment Diagnosis: Decreased ADLs, transfers, mobility associated with Acute diastolic heart failure  Prognosis: Good  Decision Making: Low Complexity  Patient Education: Eval, POC, discharge recommendations  Barriers to Learning: Hearing  REQUIRES OT FOLLOW UP: Yes  Activity Tolerance  Activity Tolerance: Patient Tolerated treatment well  Safety Devices  Safety Devices in place: Yes  Type of devices: All fall risk precautions in place;Call light within reach; Chair alarm in place; Left in chair;Nurse notified; Patient at risk for falls;Gait belt         Plan   Plan  Times per week: 2-5  Times per day: Daily  Current Treatment Recommendations: Strengthening, Balance Training, Functional Mobility Training, Endurance Training, Self-Care / ADL    G-Code  OT G-codes  Functional Limitation: Self care  Self Care Current Status (): At least 20 percent but less than 40 percent impaired, limited or restricted  Self Care Goal Status ():  At least 1 percent but less than 20 percent impaired, limited or restricted    AM-PAC Score        AM-[facility] Inpatient Daily Activity Raw Score: 17  AM-PAC Inpatient ADL T-Scale Score : 37.26  ADL Inpatient CMS 0-100% Score: 50.11  ADL Inpatient CMS G-Code Modifier : CK    Goals  Short term goals  Time Frame for Short term goals: Discharge   Short term goal 1: Patient will bathe [facility]  Short term goal 2: Patient will dress [facility]  Short term goal 3: Patient will perform functional ADL transfers Supervision  Short term goal 4: Patient will perform functional mobility supervision with appropriate AD  Long term goals  Time Frame for Long term goals : LTG=STG  Patient Goals   Patient goals : Home       Therapy Time   Individual Concurrent Group Co-treatment   Time In 6297         Time Out 1046         Minutes 38              Timed Code Treatment Minutes:  23 Minutes    Total Treatment Minutes:  Yumiko Kwon 82, OTR/L [id number]

## 2018-09-27 NOTE — PROGRESS NOTES
[facility]   Progress Note  Cardiology    CC: Dyspnea, possible CHF    HPI:    Better. Daughter says she was up a lot yesterday. Wheezes at home and very inactive. Wheezing better today. Does not have home O2. Unclear if she will need on d/c    Medications/Labs all Reviewed    Lab Results   Component Value Date    WBC 11.1 (H) 09/23/2018    HGB 12.8 09/23/2018    HCT 37.5 09/23/2018    MCV 88.0 09/23/2018     09/23/2018     Lab Results   Component Value Date    CREATININE 1.5 (H) 09/27/2018    BUN 27 (H) 09/27/2018     09/27/2018    K 4.7 09/27/2018    CL 92 (L) 09/27/2018    CO2 36 (H) 09/27/2018     No results found for: INR, PROTIME     Physical Examination:    BP (!) 105/54   Pulse 90   Temp 98.7 °F (37.1 °C) (Oral)   Resp 15   Ht 4' 10\" (1.473 m)   Wt 245 lb 3.2 oz (111.2 kg)   SpO2 93%   BMI 51.25 kg/m²      Respiratory:  · Resp Assessment: Normal respiratory effort  · Resp Auscultation: No wheezing bilaterally   Cardiovascular:  · Auscultation: regular rate and rhythm, normal S1S2, no murmur, rub or gallop  · Palpation:  Nl PMI  · JVP:  normal  · Extremities: No Edema  Abdomen:  · Soft, non-tender  · Normal bowel sounds  Extremities:  ·  No Cyanosis or Clubbing  Neurological/Psychiatric:  · Oriented to time, place, and person  · Non-anxious  Skin:   · Warm and dry      Assessment:       Acute diastolic heart failure (HCC) (9/23/2018)    Assessment: Normal LV function with normal filling pressure. Now compensated    Plan: Continue po lasix     HTN (hypertension) (9/23/2018)    Assessment: well controlled    Plan: same meds      PRABHA (acute kidney injury) ([facility] (9/23/2018)    Assessment: stable    Plan: follow    Dyspnea:  CHF seems compensated. At this point breathing issues appear related to lungs  Rx per medicine. Unclear if she will need home O2    Stable from a cardiac standpoint for discharge.  Home when medicine feels pulmonary status stable  Will arrange CHF f/u    Eloisa Macias MD, 9/27/2018 8:52 AM

## 2018-09-27 NOTE — PLAN OF CARE
Problem: OXYGENATION/RESPIRATORY FUNCTION  Goal: Patient will maintain patent airway  Patient o2 sats have remained stable on 2 liters of o2. Going home with 02 orders.

## 2018-09-27 NOTE — CARE COORDINATION
[facility]    Received referral for homecare services. Will follow patient for homecare services at discharge. Electronically signed by An Feliciano LPN on 2/54/48 at 36:70 PM    *[facility].    Spoke with patient and daughter present in room, regarding [facility] services. Patient aware and agreeable to services. Demographics verified. Will continue to follow. Electronically signed by An Feliciano LPN on 6/07/79 at 73:66 PM        [address]  [place] [address] Transition Nurse  [phone]

## 2018-09-27 NOTE — PROGRESS NOTES
Home Oxygen Evaluation  Option 1        Option 2         Patients room air at rest saturation SpO2 91%      Patients room air saturation SpO2 86% with exertion   o Patients SpO2 on exertion with oxygen      2 lpm = SpO2  94%

## 2018-09-27 NOTE — CARE COORDINATION
Notified [address], and [facility] of plan to discharge today, pending a DME-02  order, and a home care order.   MD, Please complete & sign the e-KINGSLEY under the discharge instructions, AVS/Prescriptions, and or medical necessity note for assistance with discharge planning, Thank you, Belita Opitz, MSTIMMY, [phone]

## 2018-09-27 NOTE — PROGRESS NOTES
Physical Therapy    Facility/Department: [address]  Initial Assessment    NAME: Yohan Garcia  : 1931  MRN: [id number]    Date of Service: 2018    Discharge Recommendations:    Yohan Garcia scored a 18/24 on the AM-PAC short mobility form. Current research shows that an AM-PAC score of 18 or greater is typically associated with a discharge to the patient's home setting. Based on the patients AM-PAC score and their current functional mobility deficits, it is recommended that the patient have 2-3 sessions per week of Physical Therapy at d/c to increase the patients independence. HOME HEALTH CARE: LEVEL 3 SAFETY     - Initial home health evaluation to occur within 24-48 hours, in patient home   - Therapy evaluations in home within 24-48 hours of discharge; including DME and home safety   - Frontload therapy 5 days, then 3x a week   - Therapy to evaluate if patient has [address] needs for personal care   -  evaluation within 24-48 hours, includes evaluation of resources and insurance to determine [place], [place], LTC, and Medicaid options     PT Equipment Recommendations  Equipment Needed: No  Other: pt has 4WW at home    Patient Diagnosis(es): The primary encounter diagnosis was Dyspnea, unspecified type. Diagnoses of Acute on chronic systolic congestive heart failure ([address] Hypokalemia, Hyponatremia, and Hypermagnesemia were also pertinent to this visit. has a past medical history of CHF (congestive heart failure) ([address] COPD (chronic obstructive pulmonary disease) ([address] Depression; and Hypertension. has no past surgical history on file. Restrictions  Restrictions/Precautions  Restrictions/Precautions: Fall Risk (High fall risk)  Position Activity Restriction  Other position/activity restrictions: 80-year-old female with a history of CHF and COPD who presents the emergency department with worsening shortness of breath and edema.   This is been ongoing for some time but

## 2018-09-28 NOTE — DISCHARGE SUMMARY
bilaterally. No large pleural effusion. No significant skeletal findings. Perihilar and mild bibasilar ground-glass opacities may represent mild pulmonary edema. A viral infection may also be considered clinically. Xr Chest Portable    Result Date: 9/25/2018  EXAMINATION: SINGLE XRAY VIEW OF THE CHEST 9/25/2018 10:41 am COMPARISON: September 23, 2018 HISTORY: ORDERING SYSTEM PROVIDED HISTORY: eval luns for chf/ pnuemonia TECHNOLOGIST PROVIDED HISTORY: Reason for exam:->eval luns for chf/ pnuemonia Acuity: Unknown Type of Exam: Unknown FINDINGS: Cardiac silhouette is normal in size. No pneumothorax. No pleural effusion. No consolidation. Mild pulmonary vascular congestion. Mild pulmonary vascular congestion. EKG     Rhythm: normal sinus   Rate: normal  Clinical Impression: no acute changes        The patient was seen and examined on day of discharge and this discharge summary is in conjunction with any daily progress note from day of discharge. Time Spent on discharge is 30 minutes  in the examination, evaluation, counseling and review of medications and discharge plan. Note that greater than 30 minutes was spent in preparing discharge papers, discussing discharge with patient, medication review, etc.       Signed:    Alon Sky MD   9/28/2018      Thank you Ashley Banegas DO for the opportunity to be involved in this patient's care.  If you have any questions or concerns please feel free to contact me at [phone]

## 2018-09-28 NOTE — PROGRESS NOTES
Occupational Therapy Discharge Summary    Name: Lucille Curling  : 1931    The pt was evaluated by OT on 2018 and seen for 0 treatment sessions prior to DC to home on 2018 per MD order. The pt's acute therapy goals were:  Short term goals  Time Frame for Short term goals: Discharge   Short term goal 1: Patient will bathe [facility]  Short term goal 2: Patient will dress [facility]  Short term goal 3: Patient will perform functional ADL transfers Supervision  Short term goal 4: Patient will perform functional mobility supervision with appropriate AD  Long term goals  Time Frame for Long term goals : LTG=STG     Patient met 0 goals during stay. Number of Refusals:0  Number of Holds: 0  During this hospitalization, the patient was educated on:  Patient Education: HEIKE Nicole, discharge recommendations    DC pt from OT caseload at this time.   Thank you, Griselda Edu, [address]

## 2018-10-02 ENCOUNTER — TELEPHONE (OUTPATIENT)
Dept: OTHER | Age: 83
End: 2018-10-02

## 2018-10-05 ENCOUNTER — HOSPITAL ENCOUNTER (OUTPATIENT)
Age: 83
Setting detail: SPECIMEN
Discharge: HOME | End: 2018-10-05
Payer: MEDICARE

## 2018-10-05 LAB
ANION GAP SERPL CALCULATED.3IONS-SCNC: 14 MMOL/L (ref 3–16)
BUN BLDV-MCNC: 36 MG/DL (ref 7–20)
CALCIUM SERPL-MCNC: 9.8 MG/DL (ref 8.3–10.6)
CHLORIDE BLD-SCNC: 91 MMOL/L (ref 99–110)
CO2: 33 MMOL/L (ref 21–32)
CREAT SERPL-MCNC: 1.5 MG/DL (ref 0.6–1.2)
GFR AFRICAN AMERICAN: 40
GFR NON-AFRICAN AMERICAN: 33
GLUCOSE BLD-MCNC: 121 MG/DL (ref 70–99)
POTASSIUM SERPL-SCNC: 3.2 MMOL/L (ref 3.5–5.1)
PRO-BNP: 226 PG/ML (ref 0–449)
SODIUM BLD-SCNC: 138 MMOL/L (ref 136–145)

## 2018-10-05 PROCEDURE — 36415 COLL VENOUS BLD VENIPUNCTURE: CPT

## 2018-10-05 PROCEDURE — 80048 BASIC METABOLIC PNL TOTAL CA: CPT

## 2018-10-05 PROCEDURE — 83880 ASSAY OF NATRIURETIC PEPTIDE: CPT

## 2018-10-08 ENCOUNTER — TELEPHONE (OUTPATIENT)
Dept: CARDIOLOGY CLINIC | Age: 83
End: 2018-10-08

## 2018-10-08 NOTE — TELEPHONE ENCOUNTER
Home number is disconnected. Spoke with emergency contact Luan Lugo (daughter). Advised to decrease to 40 mg daily. Repeat blood work in 1 week.

## 2018-10-09 ENCOUNTER — TELEPHONE (OUTPATIENT)
Dept: CARDIOLOGY CLINIC | Age: 83
End: 2018-10-09

## 2018-10-10 ENCOUNTER — OFFICE VISIT (OUTPATIENT)
Dept: CARDIOLOGY CLINIC | Age: 83
End: 2018-10-10
Payer: MEDICARE

## 2018-10-10 VITALS
DIASTOLIC BLOOD PRESSURE: 62 MMHG | WEIGHT: 245 LBS | SYSTOLIC BLOOD PRESSURE: 112 MMHG | HEART RATE: 100 BPM | HEIGHT: 59 IN | BODY MASS INDEX: 49.39 KG/M2

## 2018-10-10 DIAGNOSIS — I50.31 ACUTE DIASTOLIC HEART FAILURE (HCC): Primary | ICD-10-CM

## 2018-10-10 DIAGNOSIS — I10 ESSENTIAL HYPERTENSION: ICD-10-CM

## 2018-10-10 PROCEDURE — 99214 OFFICE O/P EST MOD 30 MIN: CPT | Performed by: NURSE PRACTITIONER

## 2018-10-10 RX ORDER — SPIRONOLACTONE 25 MG/1
12.5 TABLET ORAL DAILY
Qty: 30 TABLET | Refills: 3
Start: 2018-10-10

## 2018-10-12 ENCOUNTER — HOSPITAL ENCOUNTER (OUTPATIENT)
Age: 83
Setting detail: SPECIMEN
Discharge: HOME | End: 2018-10-12
Payer: MEDICARE

## 2018-10-12 LAB
ANION GAP SERPL CALCULATED.3IONS-SCNC: 16 MMOL/L (ref 3–16)
BUN BLDV-MCNC: 45 MG/DL (ref 7–20)
CALCIUM SERPL-MCNC: 9.4 MG/DL (ref 8.3–10.6)
CHLORIDE BLD-SCNC: 85 MMOL/L (ref 99–110)
CO2: 32 MMOL/L (ref 21–32)
CREAT SERPL-MCNC: 1.8 MG/DL (ref 0.6–1.2)
GFR AFRICAN AMERICAN: 32
GFR NON-AFRICAN AMERICAN: 27
GLUCOSE BLD-MCNC: 203 MG/DL (ref 70–99)
POTASSIUM SERPL-SCNC: 3.6 MMOL/L (ref 3.5–5.1)
PRO-BNP: 500 PG/ML (ref 0–449)
SODIUM BLD-SCNC: 133 MMOL/L (ref 136–145)

## 2018-10-12 PROCEDURE — 83880 ASSAY OF NATRIURETIC PEPTIDE: CPT

## 2018-10-12 PROCEDURE — 36415 COLL VENOUS BLD VENIPUNCTURE: CPT

## 2018-10-12 PROCEDURE — 80048 BASIC METABOLIC PNL TOTAL CA: CPT

## 2018-10-15 ENCOUNTER — TELEPHONE (OUTPATIENT)
Dept: CARDIOLOGY CLINIC | Age: 83
End: 2018-10-15

## 2018-10-15 DIAGNOSIS — R79.89 CREATININE ELEVATION: Primary | ICD-10-CM

## 2018-10-19 ENCOUNTER — HOSPITAL ENCOUNTER (OUTPATIENT)
Age: 83
Setting detail: SPECIMEN
Discharge: HOME | End: 2018-10-19
Payer: MEDICARE

## 2018-10-19 LAB
ANION GAP SERPL CALCULATED.3IONS-SCNC: 13 MMOL/L (ref 3–16)
BUN BLDV-MCNC: 30 MG/DL (ref 7–20)
CALCIUM SERPL-MCNC: 10.1 MG/DL (ref 8.3–10.6)
CHLORIDE BLD-SCNC: 90 MMOL/L (ref 99–110)
CO2: 33 MMOL/L (ref 21–32)
CREAT SERPL-MCNC: 1.7 MG/DL (ref 0.6–1.2)
GFR AFRICAN AMERICAN: 34
GFR NON-AFRICAN AMERICAN: 28
GLUCOSE BLD-MCNC: 196 MG/DL (ref 70–99)
POTASSIUM SERPL-SCNC: 3.9 MMOL/L (ref 3.5–5.1)
PRO-BNP: 580 PG/ML (ref 0–449)
SODIUM BLD-SCNC: 136 MMOL/L (ref 136–145)

## 2018-10-19 PROCEDURE — 36415 COLL VENOUS BLD VENIPUNCTURE: CPT

## 2018-10-19 PROCEDURE — 83880 ASSAY OF NATRIURETIC PEPTIDE: CPT

## 2018-10-19 PROCEDURE — 80048 BASIC METABOLIC PNL TOTAL CA: CPT

## 2018-10-22 ENCOUNTER — TELEPHONE (OUTPATIENT)
Dept: CARDIOLOGY CLINIC | Age: 83
End: 2018-10-22

## 2018-10-26 ENCOUNTER — HOSPITAL ENCOUNTER (OUTPATIENT)
Age: 83
Setting detail: SPECIMEN
Discharge: HOME | End: 2018-10-26
Payer: MEDICARE

## 2018-10-26 LAB
ANION GAP SERPL CALCULATED.3IONS-SCNC: 15 MMOL/L (ref 3–16)
BUN BLDV-MCNC: 19 MG/DL (ref 7–20)
CALCIUM SERPL-MCNC: 9.1 MG/DL (ref 8.3–10.6)
CHLORIDE BLD-SCNC: 92 MMOL/L (ref 99–110)
CO2: 30 MMOL/L (ref 21–32)
CREAT SERPL-MCNC: 1.3 MG/DL (ref 0.6–1.2)
GFR AFRICAN AMERICAN: 47
GFR NON-AFRICAN AMERICAN: 39
GLUCOSE BLD-MCNC: 180 MG/DL (ref 70–99)
POTASSIUM SERPL-SCNC: 4 MMOL/L (ref 3.5–5.1)
PRO-BNP: 1110 PG/ML (ref 0–449)
SODIUM BLD-SCNC: 137 MMOL/L (ref 136–145)

## 2018-10-26 PROCEDURE — 36415 COLL VENOUS BLD VENIPUNCTURE: CPT

## 2018-10-26 PROCEDURE — 83880 ASSAY OF NATRIURETIC PEPTIDE: CPT

## 2018-10-26 PROCEDURE — 80048 BASIC METABOLIC PNL TOTAL CA: CPT

## 2018-11-01 ENCOUNTER — TELEPHONE (OUTPATIENT)
Dept: CARDIOLOGY CLINIC | Age: 83
End: 2018-11-01

## 2019-04-21 ENCOUNTER — HOSPITAL ENCOUNTER (EMERGENCY)
Age: 84
Discharge: HOME | End: 2019-04-21
Attending: EMERGENCY MEDICINE
Payer: MEDICARE

## 2019-04-21 ENCOUNTER — APPOINTMENT (OUTPATIENT)
Dept: CT IMAGING | Age: 84
End: 2019-04-21
Payer: MEDICARE

## 2019-04-21 VITALS
BODY MASS INDEX: 50.4 KG/M2 | RESPIRATION RATE: 18 BRPM | TEMPERATURE: 97.7 F | OXYGEN SATURATION: 94 % | HEIGHT: 59 IN | WEIGHT: 250 LBS | SYSTOLIC BLOOD PRESSURE: 133 MMHG | DIASTOLIC BLOOD PRESSURE: 49 MMHG | HEART RATE: 101 BPM

## 2019-04-21 DIAGNOSIS — I71.40 ABDOMINAL AORTIC ANEURYSM (AAA) WITHOUT RUPTURE: ICD-10-CM

## 2019-04-21 DIAGNOSIS — M54.50 ACUTE RIGHT-SIDED LOW BACK PAIN WITHOUT SCIATICA: Primary | ICD-10-CM

## 2019-04-21 LAB
A/G RATIO: 0.9 (ref 1.1–2.2)
ALBUMIN SERPL-MCNC: 3.5 G/DL (ref 3.4–5)
ALP BLD-CCNC: 94 U/L (ref 40–129)
ALT SERPL-CCNC: 10 U/L (ref 10–40)
ANION GAP SERPL CALCULATED.3IONS-SCNC: 13 MMOL/L (ref 3–16)
AST SERPL-CCNC: 17 U/L (ref 15–37)
BASOPHILS ABSOLUTE: 0 K/UL (ref 0–0.2)
BASOPHILS RELATIVE PERCENT: 0.7 %
BILIRUB SERPL-MCNC: <0.2 MG/DL (ref 0–1)
BILIRUBIN URINE: NEGATIVE
BLOOD, URINE: NEGATIVE
BUN BLDV-MCNC: 18 MG/DL (ref 7–20)
CALCIUM SERPL-MCNC: 8.8 MG/DL (ref 8.3–10.6)
CHLORIDE BLD-SCNC: 101 MMOL/L (ref 99–110)
CLARITY: CLEAR
CO2: 27 MMOL/L (ref 21–32)
COLOR: YELLOW
CREAT SERPL-MCNC: 0.9 MG/DL (ref 0.6–1.2)
EOSINOPHILS ABSOLUTE: 0.3 K/UL (ref 0–0.6)
EOSINOPHILS RELATIVE PERCENT: 5.8 %
GFR AFRICAN AMERICAN: >60
GFR NON-AFRICAN AMERICAN: 59
GLOBULIN: 3.7 G/DL
GLUCOSE BLD-MCNC: 123 MG/DL (ref 70–99)
GLUCOSE URINE: NEGATIVE MG/DL
HCT VFR BLD CALC: 38.5 % (ref 36–48)
HEMOGLOBIN: 12.2 G/DL (ref 12–16)
KETONES, URINE: NEGATIVE MG/DL
LACTIC ACID: 1.3 MMOL/L (ref 0.4–2)
LEUKOCYTE ESTERASE, URINE: NEGATIVE
LIPASE: 46 U/L (ref 13–60)
LYMPHOCYTES ABSOLUTE: 1.8 K/UL (ref 1–5.1)
LYMPHOCYTES RELATIVE PERCENT: 31.6 %
MCH RBC QN AUTO: 26.9 PG (ref 26–34)
MCHC RBC AUTO-ENTMCNC: 31.7 G/DL (ref 31–36)
MCV RBC AUTO: 84.7 FL (ref 80–100)
MICROSCOPIC EXAMINATION: NORMAL
MONOCYTES ABSOLUTE: 0.6 K/UL (ref 0–1.3)
MONOCYTES RELATIVE PERCENT: 11.1 %
NEUTROPHILS ABSOLUTE: 2.9 K/UL (ref 1.7–7.7)
NEUTROPHILS RELATIVE PERCENT: 50.8 %
NITRITE, URINE: NEGATIVE
PDW BLD-RTO: 16.3 % (ref 12.4–15.4)
PH UA: 6 (ref 5–8)
PLATELET # BLD: 281 K/UL (ref 135–450)
PMV BLD AUTO: 8.9 FL (ref 5–10.5)
POTASSIUM SERPL-SCNC: 4.1 MMOL/L (ref 3.5–5.1)
PROTEIN UA: NEGATIVE MG/DL
RBC # BLD: 4.54 M/UL (ref 4–5.2)
SODIUM BLD-SCNC: 141 MMOL/L (ref 136–145)
SPECIFIC GRAVITY UA: 1 (ref 1–1.03)
TOTAL PROTEIN: 7.2 G/DL (ref 6.4–8.2)
URINE REFLEX TO CULTURE: NORMAL
URINE TYPE: NORMAL
UROBILINOGEN, URINE: 0.2 E.U./DL
WBC # BLD: 5.6 K/UL (ref 4–11)

## 2019-04-21 PROCEDURE — 6370000000 HC RX 637 (ALT 250 FOR IP): Performed by: EMERGENCY MEDICINE

## 2019-04-21 PROCEDURE — 6360000002 HC RX W HCPCS: Performed by: PHYSICIAN ASSISTANT

## 2019-04-21 PROCEDURE — 2580000003 HC RX 258: Performed by: PHYSICIAN ASSISTANT

## 2019-04-21 PROCEDURE — 83605 ASSAY OF LACTIC ACID: CPT

## 2019-04-21 PROCEDURE — 81003 URINALYSIS AUTO W/O SCOPE: CPT

## 2019-04-21 PROCEDURE — 80053 COMPREHEN METABOLIC PANEL: CPT

## 2019-04-21 PROCEDURE — 83690 ASSAY OF LIPASE: CPT

## 2019-04-21 PROCEDURE — 96375 TX/PRO/DX INJ NEW DRUG ADDON: CPT

## 2019-04-21 PROCEDURE — 96361 HYDRATE IV INFUSION ADD-ON: CPT

## 2019-04-21 PROCEDURE — 85025 COMPLETE CBC W/AUTO DIFF WBC: CPT

## 2019-04-21 PROCEDURE — 99284 EMERGENCY DEPT VISIT MOD MDM: CPT

## 2019-04-21 PROCEDURE — 72131 CT LUMBAR SPINE W/O DYE: CPT

## 2019-04-21 PROCEDURE — 96374 THER/PROPH/DIAG INJ IV PUSH: CPT

## 2019-04-21 RX ORDER — HYDROCODONE BITARTRATE AND ACETAMINOPHEN 5; 325 MG/1; MG/1
1 TABLET ORAL EVERY 6 HOURS PRN
Qty: 10 TABLET | Refills: 0 | Status: SHIPPED | OUTPATIENT
Start: 2019-04-21 | End: 2019-04-24

## 2019-04-21 RX ORDER — ONDANSETRON 2 MG/ML
4 INJECTION INTRAMUSCULAR; INTRAVENOUS ONCE
Status: COMPLETED | OUTPATIENT
Start: 2019-04-21 | End: 2019-04-21

## 2019-04-21 RX ORDER — LIDOCAINE 4 G/G
1 PATCH TOPICAL ONCE
Status: DISCONTINUED | OUTPATIENT
Start: 2019-04-21 | End: 2019-04-21 | Stop reason: HOSPADM

## 2019-04-21 RX ORDER — MORPHINE SULFATE 4 MG/ML
4 INJECTION, SOLUTION INTRAMUSCULAR; INTRAVENOUS EVERY 30 MIN PRN
Status: DISCONTINUED | OUTPATIENT
Start: 2019-04-21 | End: 2019-04-21 | Stop reason: HOSPADM

## 2019-04-21 RX ORDER — 0.9 % SODIUM CHLORIDE 0.9 %
500 INTRAVENOUS SOLUTION INTRAVENOUS ONCE
Status: COMPLETED | OUTPATIENT
Start: 2019-04-21 | End: 2019-04-21

## 2019-04-21 RX ADMIN — SODIUM CHLORIDE 500 ML: 9 INJECTION, SOLUTION INTRAVENOUS at 17:34

## 2019-04-21 RX ADMIN — MORPHINE SULFATE 4 MG: 4 INJECTION INTRAVENOUS at 17:35

## 2019-04-21 RX ADMIN — ONDANSETRON 4 MG: 2 INJECTION INTRAMUSCULAR; INTRAVENOUS at 17:35

## 2019-04-21 ASSESSMENT — PAIN SCALES - GENERAL
PAINLEVEL_OUTOF10: 3
PAINLEVEL_OUTOF10: 10
PAINLEVEL_OUTOF10: 10

## 2019-04-21 ASSESSMENT — PAIN DESCRIPTION - ORIENTATION
ORIENTATION: LOWER
ORIENTATION: LOWER;RIGHT

## 2019-04-21 ASSESSMENT — PAIN DESCRIPTION - LOCATION
LOCATION: BACK
LOCATION: BACK

## 2019-04-21 ASSESSMENT — PAIN DESCRIPTION - PAIN TYPE
TYPE: ACUTE PAIN
TYPE: ACUTE PAIN

## 2019-04-21 ASSESSMENT — PAIN DESCRIPTION - PROGRESSION: CLINICAL_PROGRESSION: GRADUALLY IMPROVING

## 2019-04-21 NOTE — ED PROVIDER NOTES
I independently examined and evaluated Vandana Humphrey. In brief history of present illness revealed 59-year-old female who presents with daughter for 2-3 days of gradually worsening lower back pain, more towards the right. She did have urinary frequency but no abdominal pain, fevers, vomiting or dysuria. Denies any recent falls or trauma. Physical examination revealed uncomfortable but well-appearing female. Abdomen is obese, soft, nontender, nondistended without rebound or guarding. Patient has mild tenderness to palpation of the lower lumbar spine and right paraspinal muscles.   No overlying rash or bruising    I have reviewed and interpreted all of the currently available lab results from this visit:  Results for orders placed or performed during the hospital encounter of 04/21/19   CBC Auto Differential   Result Value Ref Range    WBC 5.6 4.0 - 11.0 K/uL    RBC 4.54 4.00 - 5.20 M/uL    Hemoglobin 12.2 12.0 - 16.0 g/dL    Hematocrit 38.5 36.0 - 48.0 %    MCV 84.7 80.0 - 100.0 fL    MCH 26.9 26.0 - 34.0 pg    MCHC 31.7 31.0 - 36.0 g/dL    RDW 16.3 (H) 12.4 - 15.4 %    Platelets 844 575 - 622 K/uL    MPV 8.9 5.0 - 10.5 fL    Neutrophils % 50.8 %    Lymphocytes % 31.6 %    Monocytes % 11.1 %    Eosinophils % 5.8 %    Basophils % 0.7 %    Neutrophils # 2.9 1.7 - 7.7 K/uL    Lymphocytes # 1.8 1.0 - 5.1 K/uL    Monocytes # 0.6 0.0 - 1.3 K/uL    Eosinophils # 0.3 0.0 - 0.6 K/uL    Basophils # 0.0 0.0 - 0.2 K/uL   Comprehensive Metabolic Panel   Result Value Ref Range    Sodium 141 136 - 145 mmol/L    Potassium 4.1 3.5 - 5.1 mmol/L    Chloride 101 99 - 110 mmol/L    CO2 27 21 - 32 mmol/L    Anion Gap 13 3 - 16    Glucose 123 (H) 70 - 99 mg/dL    BUN 18 7 - 20 mg/dL    CREATININE 0.9 0.6 - 1.2 mg/dL    GFR Non- 59 (A) >60    GFR African American >60 >60    Calcium 8.8 8.3 - 10.6 mg/dL    Total Protein 7.2 6.4 - 8.2 g/dL    Alb 3.5 3.4 - 5.0 g/dL    Albumin/Globulin Ratio 0.9 (L) 1.1 - 2.2 Total Bilirubin <0.2 0.0 - 1.0 mg/dL    Alkaline Phosphatase 94 40 - 129 U/L    ALT 10 10 - 40 U/L    AST 17 15 - 37 U/L    Globulin 3.7 g/dL   Urinalysis Reflex to Culture   Result Value Ref Range    Color, UA YELLOW Straw/Yellow    Clarity, UA Clear Clear    Glucose, Ur Negative Negative mg/dL    Bilirubin Urine Negative Negative    Ketones, Urine Negative Negative mg/dL    Specific Gravity, UA 1.005 1.005 - 1.030    Blood, Urine Negative Negative    pH, UA 6.0 5.0 - 8.0    Protein, UA Negative Negative mg/dL    Urobilinogen, Urine 0.2 <2.0 E.U./dL    Nitrite, Urine Negative Negative    Leukocyte Esterase, Urine Negative Negative    Microscopic Examination Not Indicated     Urine Reflex to Culture Not Indicated     Urine Type Not Specified    Lipase   Result Value Ref Range    Lipase 46.0 13.0 - 60.0 U/L   Lactic Acid, Plasma   Result Value Ref Range    Lactic Acid 1.3 0.4 - 2.0 mmol/L        CT LUMBAR SPINE WO CONTRAST (Final result)   Result time 04/21/19 17:59:32   Final result by Arron Zavala MD (04/21/19 17:59:32)                Impression:    No compression fracture is identified. Degenerative changes as detailed above with grade 1 spondylolisthesis of L3  on L4, L4 on L5, and L5 on S1, likely due to facet arthropathy. Incidentally noted diverticulosis coli and infrarenal abdominal aortic  aneurysm. Also, nonspecific nodular density within the right lung base.  This could  reflect subsegmental atelectasis or an infiltrate.  Recommend clinical  correlation and attention on any follow-up imaging. RECOMMENDATIONS:  Managing Abdominal Aortic Aneurysms    2.6-2.9 cm: Every 5 years*    3.0-3.4 cm: Every 3 years. 3.5-3.9 cm: Every 1 year. 4.0-4.4 cm: Every 1 year. Recommend vascular consultation. 4.5-5.4 cm: Every 6 months. Recommend vascular consultation. Greater than or equal to 5.5 cm: Referral to vascular surgeon.     *For abdominal aortas with maximum diameter of 2.6-2.9 cm meeting criteria  for AAA (>50% of proximal normal segment). Reference:    J Vasc Surg. 2009 Oct;50(4 Suppl):S2-49            Narrative:    EXAMINATION:  CT OF THE LUMBAR SPINE WITHOUT CONTRAST  4/21/2019    TECHNIQUE:  CT of the lumbar spine was performed without the administration of  intravenous contrast. Multiplanar reformatted images are provided for review. Dose modulation, iterative reconstruction, and/or weight based adjustment of  the mA/kV was utilized to reduce the radiation dose to as low as reasonably  achievable. COMPARISON:  None    HISTORY:  ORDERING SYSTEM PROVIDED HISTORY: low back pain r/o compression fx  TECHNOLOGIST PROVIDED HISTORY:  Reason for exam:->low back pain r/o compression fx  Ordering Physician Provided Reason for Exam: Back Pain (back pain lower  started all week, frequency urine)    FINDINGS:  BONES/ALIGNMENT: There is normal alignment of the spine. The vertebral body  heights are maintained. No osseous destructive lesion is seen. DEGENERATIVE CHANGES: There are moderate degenerative changes with grade 1  anterolisthesis of L3 on L4, L4 on L5, and L5 on S1, likely due to facet  arthropathy. SOFT TISSUES/RETROPERITONEUM: There is diverticulosis coli.  Vascular  calcifications are present and there is an infrarenal abdominal aortic  aneurysm measuring 3.1 cm in diameter.  A 13 mm nodular density is present  within the right lung base posteriorly. Before disposition, questions were sought and answered with the patient. They have voiced understanding and agree with the plan. All diagnostic, treatment, and disposition decisions were made by myself in conjunction with the advanced practice provider. For all further details of the patient's emergency department visit, please see the advanced practitioner's documentation.        Jose Barba MD  04/21/19 5490

## 2019-04-21 NOTE — ED NOTES
Peripheral IV established, blood specimens obtained and sent to lab for processing. Patient medicated per BHARATHI Hernandez RN  04/21/19 8089

## 2019-04-22 ASSESSMENT — ENCOUNTER SYMPTOMS
COUGH: 0
DIARRHEA: 0
VOMITING: 0
ABDOMINAL PAIN: 0
NAUSEA: 0
RHINORRHEA: 0
SHORTNESS OF BREATH: 0
BACK PAIN: 1

## 2019-04-22 NOTE — ED PROVIDER NOTES
[address] [facility]  eMERGENCY dEPARTMENT eNCOUnter        Pt Name: Pau Silva  MRN: [id number]  [place] 8/6/1931  Date of evaluation: 4/21/2019  Provider: Caro Manrique PA-C  PCP: Enid Moncada DO    This patient was seen and evaluated by the attending physician Carmen Portillo MD    [address] [place] [address]       Chief Complaint   Patient presents with    Back Pain     back pain lower started all week, frequency urine       HISTORY OF PRESENT ILLNESS   (Location/Symptom, Timing/Onset, Context/Setting, Quality, Duration, Modifying Factors, Severity)  Note limiting factors. Pau Silva is a 80 y.o. female who presents to the emergency department today for evaluation for right-sided lower back pain. The daughter states that the patient has been complaining of pain to her lower back and this is been ongoing for the past 2-3 days, patient denies falling or injuring her back in any way. Patient is on spironolactone but she does report increased urinary frequency. She denies any dysuria or hematuria. No history of kidney stones. Patient denies any bowel or bladder incontinence or retention. No saddle anesthesias. She denies any chest recurrence of breath. No abdominal pain. No nausea, vomiting or diarrhea. She is rating her pain as a 7/10, pain is worse with movements, it is sharp, no radiculopathy. No known alleviating factors. Nursing Notes were all reviewed and agreed with or any disagreements were addressed  in the HPI. REVIEW OF SYSTEMS    (2-9 systems for level 4, 10 or more for level 5)     Review of Systems   Constitutional: Negative for activity change, appetite change, chills and fever. HENT: Negative for congestion and rhinorrhea. Respiratory: Negative for cough and shortness of breath. Cardiovascular: Negative for chest pain. Gastrointestinal: Negative for abdominal pain, diarrhea, nausea and vomiting.    Genitourinary: Negative for difficulty urinating, dysuria and hematuria. Musculoskeletal: Positive for back pain. Neurological: Negative for weakness and numbness. Positives and Pertinent negatives as per HPI. Except as noted abovein the ROS, all other systems were reviewed and negative. PAST MEDICAL HISTORY     Past Medical History:   Diagnosis Date    [facility]     COPD (chronic obstructive pulmonary disease) [facility]     Depression     Hypertension          SURGICAL HISTORY   History reviewed. No pertinent surgical history. [address]       Discharge Medication List as of 4/21/2019  7:36 PM      CONTINUE these medications which have NOT CHANGED    Details   spironolactone (ALDACTONE) 25 MG tablet Take 0.5 tablets by mouth daily, Disp-30 tablet, R-3NO PRINT      albuterol sulfate HFA (PROVENTIL HFA) 108 (90 Base) MCG/ACT inhaler Inhale 2 puffs into the lungs every 6 hours as needed for Wheezing, Disp-1 Inhaler, R-3Print      budesonide-formoterol (SYMBICORT) 160-4.5 MCG/ACT AERO Inhale 2 puffs into the lungs 2 times daily, Disp-1 Inhaler, R-3Print      furosemide (LASIX) 40 MG tablet Take 40 mg by mouth daily Historical Med      digoxin (LANOXIN) 125 MCG tablet Take 125 mcg by mouth dailyHistorical Med      venlafaxine (EFFEXOR) 75 MG tablet Take by mouth daily 2 tabs po dailyHistorical Med               ALLERGIES     Pcn [penicillins]    FAMILYHISTORY     History reviewed. No pertinent family history.        SOCIAL HISTORY       Social History     Socioeconomic History    Marital status:      Spouse name: None    Number of children: None    Years of education: None    Highest education level: None   Occupational History    None   Social Needs    Financial resource strain: None    Food insecurity:     Worry: None     Inability: None    Transportation needs:     Medical: None     Non-medical: None   Tobacco Use    Smoking status: Never Smoker    Smokeless tobacco: Never Used   Substance and Sexual Activity    Alcohol use: No    Drug use: None    Sexual activity: None   Lifestyle    Physical activity:     Days per week: None     Minutes per session: None    Stress: None   Relationships    Social connections:     Talks on phone: None     Gets together: None     Attends Anglican service: None     Active member of club or organization: None     Attends meetings of clubs or organizations: None     Relationship status: None    Intimate partner violence:     Fear of current or ex partner: None     Emotionally abused: None     Physically abused: None     Forced sexual activity: None   Other Topics Concern    None   Social History Narrative    None       SCREENINGS             PHYSICAL EXAM    (up to 7 for level 4, 8 or more for level 5)     ED Triage Vitals [04/21/19 1700]   BP Temp Temp Source Pulse Resp SpO2 Height Weight   (!) 164/83 98.1 °F (36.7 °C) Infrared 103 18 99 % 4' 11\" (1.499 m) 250 lb (113.4 kg)       Physical Exam   Constitutional: She is oriented to person, place, and time. She appears well-developed and well-nourished. HENT:   Head: Normocephalic and atraumatic. Right Ear: External ear normal.   Left Ear: External ear normal.   Nose: Nose normal.   Eyes: Right eye exhibits no discharge. Left eye exhibits no discharge. Neck: Normal range of motion. Neck supple. No tracheal deviation present. Cardiovascular: Normal rate, regular rhythm, normal heart sounds and intact distal pulses. No murmur heard. Pulmonary/Chest: Effort normal and breath sounds normal. No stridor. No respiratory distress. She has no wheezes. Abdominal: Soft. Bowel sounds are normal. She exhibits no distension. There is no tenderness. There is no guarding. Musculoskeletal: Normal range of motion. Is tenderness to palpation to the paraspinous muscles of the right lower lumbar spine, there is no midline tenderness.   No bony step-offs or crepitus   Neurological: She is alert and SYSTEM PROVIDED HISTORY: low back pain r/o compression fx TECHNOLOGIST PROVIDED HISTORY: Reason for exam:->low back pain r/o compression fx Ordering Physician Provided Reason for Exam: Back Pain (back pain lower started all week, frequency urine) FINDINGS: BONES/ALIGNMENT: There is normal alignment of the spine. The vertebral body heights are maintained. No osseous destructive lesion is seen. DEGENERATIVE CHANGES: There are moderate degenerative changes with grade 1 anterolisthesis of L3 on L4, L4 on L5, and L5 on S1, likely due to facet arthropathy. SOFT TISSUES/RETROPERITONEUM: There is diverticulosis coli. Vascular calcifications are present and there is an infrarenal abdominal aortic aneurysm measuring 3.1 cm in diameter. A 13 mm nodular density is present within the right lung base posteriorly. No compression fracture is identified. Degenerative changes as detailed above with grade 1 spondylolisthesis of L3 on L4, L4 on L5, and L5 on S1, likely due to facet arthropathy. Incidentally noted diverticulosis coli and infrarenal abdominal aortic aneurysm. Also, nonspecific nodular density within the right lung base. This could reflect subsegmental atelectasis or an infiltrate. Recommend clinical correlation and attention on any follow-up imaging. RECOMMENDATIONS: Managing Abdominal Aortic Aneurysms 2.6-2.9 cm: Every 5 years* 3.0-3.4 cm: Every 3 years. 3.5-3.9 cm: Every 1 year. 4.0-4.4 cm: Every 1 year. Recommend vascular consultation. 4.5-5.4 cm: Every 6 months. Recommend vascular consultation. Greater than or equal to 5.5 cm: Referral to vascular surgeon. *For abdominal aortas with maximum diameter of 2.6-2.9 cm meeting criteria for AAA (>50% of proximal normal segment). Reference: J Vasc Surg.  2009 Oct;50(4 Suppl):S2-49         PROCEDURES   Unless otherwise noted below, none     Procedures    CRITICAL CARE TIME   N/A    CONSULTS:  None      EMERGENCY DEPARTMENT COURSE and DIFFERENTIALDIAGNOSIS/MDM: she can be managed as an outpatient. She'll be given a prescription for Norco for her pain as a way that her symptoms are likely musculoskeletal in nature. My suspicion is low at this time for occult fracture, compression fracture, cauda equina syndrome, epidural abscess, discitis, epidural hematoma, pyonephritis, kidney stone, infected kidney stone, AAA rupture at the emergent etiology. She is to follow-up with her primary care physician within 2-3 days reevaluation. She is to return to the ED for any new or worsening symptoms. Patient was understanding and is agreeable plan. Stable for discharge      FINAL IMPRESSION      1. Acute right-sided low back pain without sciatica    2. Abdominal aortic aneurysm (AAA) without rupture [facility])          DISPOSITION/PLAN   DISPOSITION Decision To Discharge 04/21/2019 07:36:04 PM      PATIENT REFERREDTO:  Mercedelke Alvarez,   [address] [place] [address]  [phone]    Schedule an appointment as soon as possible for a visit in 2 days      [facility] Emergency Department  [address]  [facility]  [phone]    As needed, If symptoms worsen    Nery Davenport MD  [address], [address] [place] [address],[facility] Floor  [phone]      As needed      DISCHARGE MEDICATIONS:  Discharge Medication List as of 4/21/2019  7:36 PM      START taking these medications    Details   HYDROcodone-acetaminophen (NORCO) 5-325 MG per tablet Take 1 tablet by mouth every 6 hours as needed for Pain for up to 3 days. , Disp-10 tablet, R-0Print             DISCONTINUED MEDICATIONS:  Discharge Medication List as of 4/21/2019  7:36 PM                 (Please note that portions ofthis note were completed with a voice recognition program.  Efforts were made to edit the dictations but occasionally words are mis-transcribed.)    Clorinda Phalen, PA-C (electronically signed)           Clorinda Phalen, PA-C  04/22/19 6679

## 2019-12-11 NOTE — PROGRESS NOTES
[facility] Renal Note    Patient Active Problem List   Diagnosis    Acute diastolic heart failure [facility]    HTN (hypertension)    Hyponatremia    PRABHA (acute kidney injury) ([facility]    Hypokalemia     Past Medical History:   has a past medical history of CHF (congestive heart failure) ([facility] COPD (chronic obstructive pulmonary disease) ([facility] Depression; and Hypertension. Past Social History:   reports that she has never smoked. She does not have any smokeless tobacco history on file. She reports that she does not drink alcohol. Subjective:  Edema better    A comprehensive review of systems was negative except as noted above. Objective:      Vitals:    09/24/18 0435 09/24/18 0834 09/24/18 1111 09/24/18 1126   BP: 116/66  (!) 110/58    Pulse: 102  89    Resp: 18 18 19    Temp: 98.1 °F (36.7 °C)  97.5 °F (36.4 °C)    TempSrc: Oral  Temporal    SpO2: 92% 93% 90%    Weight:    241 lb 11.2 oz (109.6 kg)   Height:         I/O last 3 completed shifts:   In: 360 [P.O.:360]  Out: 1050 [Urine:1050]  I/O this shift:  In: 150 [P.O.:150]  Out: -     Chest- clear  Heart-regular  Abd-soft  Ext- 2+ edema    Labs  Hemoglobin   Date Value Ref Range Status   09/23/2018 12.8 12.0 - 16.0 g/dL Final     Hematocrit   Date Value Ref Range Status   09/23/2018 37.5 36.0 - 48.0 % Final     WBC   Date Value Ref Range Status   09/23/2018 11.1 (H) 4.0 - 11.0 K/uL Final     Platelets   Date Value Ref Range Status   09/23/2018 324 135 - 450 K/uL Final     Lab Results   Component Value Date    CREATININE 1.7 (H) 09/24/2018    BUN 38 (H) 09/24/2018     (L) 09/24/2018    K 3.3 (L) 09/24/2018    CL 87 (L) 09/24/2018    CO2 35 (H) 09/24/2018   Uosm-289  Librado-55    Assessment/Plan:  IMPRESSION/RECOMMENDATIONS:    Hyponatremia : Acute, Mild 129 SNa, asymptomatic:  - No Reported Severe symptoms: seizures, obtundation, coma, and respiratory arrest.  - Risk factors for hyponatremia: HCTZ use, PRABHA no

## 2020-11-11 NOTE — PROGRESS NOTES
Today's Date: 11/11/2020  Patient Name: Derrick Jasso  Date of admission: 11/10/2020 11:42 AM  Patient's age: 76 y.o., 1952  Admission Dx: Mediastinal mass [J98.59]    Reason for Consult: mediastinal masss   Requesting Physician: Marquez Gasca MD    CHIEF COMPLAINT:    Chief Complaint   Patient presents with    Shortness of Breath    Cough    Hemoptysis       History Obtained From: Patient and chart    HISTORY OF PRESENT ILLNESS:      This is a 79-year-old male with heavy drinker, with prior history of glottic SCC who was admitted with shortness of breath, hemoptysis and cough. 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 In 2018 he presented with a large oral cavity cancer. On April 12, 2018 at the [facility] he underwent a composite resection with segmental mandibulectomy, left partial glossectomy and wide local excision of the left floor of mouth with a bilateral selective neck dissection, transosteal extraoral plating of the mandible and free flap reconstruction. Pathology revealed a 7.5 cm moderate to poorly differentiated squamous cell carcinoma extending into mandible with evident PNI but no LVSI. Depth of invasion was 5.1 cm. There was high-grade dysplasia in the retromolar margin. 2 lymph nodes were identified but uninvolved. Pathologic staging at this time was T4a N0 M0. Postoperative radiation therapy was recommended but declined by the patient and his sister. In September 2020 he returned noting increasing pain and swelling, and decreased swallowing. A new left oropharyngeal lesion was identified and biopsied revealing a p16 negative moderate to poorly differentiated squamous cell carcinoma.  It was unclear if this represented recurrence of his 2018 oral cavity cancer or a third primary head neck malignancy. Subsequent PET scan demonstrated PET avidity in the left floor of mouth/oropharynx as well as an a large right paratracheal mass. There were small to moderate bilateral pleural effusions appreciated. CT scan at this time demonstrated, as well, the left oropharyngeal soft tissue mass and the right superior mediastinal necrotic timi mass    Recently there was a plan for him to see medical oncology and radiation oncology here locally in [facility].     Admission he had a CT scan of the chest which showed large medial right upper lobe/superior mediastinal mass with mass-effect on the trachea and esophagus concerning for neoplastic process. Additionally mildly enlarged right paratracheal lymph node noted. Past Medical History:   has a past medical history of Arthritis, Asthma, CKD (chronic kidney disease), stage III, COPD (chronic obstructive pulmonary disease) ([address] GERD (gastroesophageal reflux disease), Gout, Hypertension, Hyponatremia, Iliac artery aneurysm, right ([address] Laryngeal cancer ([address] Lung cancer ([address] and Prostate cancer ([address] Past Surgical History:   has a past surgical history that includes Tracheal surgery (3-4 years ago); Prostate surgery; Cardiac catheterization (03/04/2020); and AAA repair, endovascular (Right, 4/7/2020). Medications:    Prior to Admission medications    Medication Sig Start Date End Date Taking?  Authorizing Provider   sodium bicarbonate 650 MG tablet Take 650 mg by mouth 3 times daily    Historical Provider, MD   ipratropium-albuterol (DUONEB) 0.5-2.5 (3) MG/3ML SOLN nebulizer solution Inhale 3 mLs into the lungs 4 times daily 6/6/20   Marilu Fontana MD   hydrALAZINE (APRESOLINE) 50 MG tablet Take 1 tablet by mouth every 8 hours 6/6/20   Marilu Fontana MD   metoprolol succinate (TOPROL XL) 50 MG extended release tablet Take 1 tablet by mouth daily 6/7/20   Marilu Fontana MD   bicalutamide (CASODEX) 50 MG chemo tablet Take 1 tablet by mouth daily 6/7/20 sounds  Musculoskeletal: No cyanosis in digits, neck supple  Neurology: Cranial nerves grossly intact. Alert and oriented in time, place and person. No speech or motor deficits  Psychiatry: Appropriate affect. Not agitated  Skin: Warm, dry, normal turgor, no rash         Labs:   Recent Labs      09/23/18   1313   WBC  11.1*   HGB  12.8   HCT  37.5   PLT  324     Recent Labs      09/23/18   1918   09/24/18   0719   09/25/18   0435  09/25/18   1023  09/25/18   1625   NA  131*   < >  137   < >  133*  131*  136   K  3.1*   --   3.3*   --   3.2*   --    --    CL  80*   --   87*   --   87*   --    --    CO2  36*   --   35*   --   34*   --    --    BUN  37*   --   38*   --   35*   --    --    CREATININE  1.8*   --   1.7*   --   1.5*   --    --    CALCIUM  9.3   --   9.5   --   9.1   --    --     < > = values in this interval not displayed. Recent Labs      09/23/18   1313   AST  30   ALT  24   BILITOT  0.9   ALKPHOS  158*     No results for input(s): INR in the last 72 hours. Recent Labs      09/23/18   1313   TROPONINI  <0.01       Urinalysis:      Lab Results   Component Value Date    NITRU POSITIVE 09/23/2018    NITRU POSITIVE 09/23/2018    WBCUA 38 09/23/2018    WBCUA 37 09/23/2018    BACTERIA 4+ 09/23/2018    BACTERIA 4+ 09/23/2018    RBCUA 1 09/23/2018    RBCUA 0 09/23/2018    BLOODU Negative 09/23/2018    BLOODU Negative 09/23/2018    SPECGRAV 1.009 09/23/2018    SPECGRAV 1.010 09/23/2018    GLUCOSEU Negative 09/23/2018    GLUCOSEU Negative 09/23/2018       Radiology:  XR CHEST PORTABLE   Final Result   Mild pulmonary vascular congestion. XR CHEST STANDARD (2 VW)   Final Result   Perihilar and mild bibasilar ground-glass opacities may represent mild   pulmonary edema. A viral infection may also be considered clinically.                  Assessment/Plan:    Active Hospital Problems    Diagnosis Date Noted    Acute diastolic heart failure ([facility] [I50.31] 09/23/2018    HTN (hypertension) [I10] 09/23/2018 Paige Doyle MD   amLODIPine (NORVASC) 10 MG tablet Take 1 tablet by mouth daily 4/18/20   Paige Doyle MD   Cholecalciferol (VITAMIN D3) 50 MCG (2000 UT) CAPS Take 1 capsule by mouth daily    Historical Provider, MD   albuterol sulfate  (90 Base) MCG/ACT inhaler Inhale 2 puffs into the lungs every 6 hours as needed for Wheezing or Shortness of Breath    Historical Provider, MD   budesonide-formoterol (SYMBICORT) 160-4.5 MCG/ACT AERO Inhale 2 puffs into the lungs 2 times daily    Historical Provider, MD   omeprazole (PRILOSEC) 20 MG delayed release capsule Take 20 mg by mouth every morning (before breakfast)    Historical Provider, MD   allopurinol (ZYLOPRIM) 300 MG tablet Take 300 mg by mouth daily.     Historical Provider, MD     Current Facility-Administered Medications   Medication Dose Route Frequency Provider Last Rate Last Dose    HYDROcodone-acetaminophen (NORCO) 5-325 MG per tablet 1 tablet  1 tablet Oral Q6H PRN Paige Doyle MD   1 tablet at 11/11/20 1004    albuterol sulfate  (90 Base) MCG/ACT inhaler 2 puff  2 puff Inhalation Q6H PRN Paige Doyle MD        amLODIPine (NORVASC) tablet 10 mg  10 mg Oral Daily Paige Doyle MD   10 mg at 11/11/20 5222    bicalutamide (CASODEX) chemo tablet 50 mg  50 mg Oral Daily Paige Doyle MD   50 mg at 11/11/20 1984    budesonide-formoterol (SYMBICORT) 160-4.5 MCG/ACT inhaler 2 puff  2 puff Inhalation BID Paige Doyle MD        Vitamin D (CHOLECALCIFEROL) tablet 2,000 Units  2,000 Units Oral Daily Paige Doyle MD   2,000 Units at 11/11/20 6981    hydrALAZINE (APRESOLINE) tablet 50 mg  50 mg Oral 3 times per day Paige Doyle MD   50 mg at 11/11/20 1423    ipratropium-albuterol (DUONEB) nebulizer solution 3 mL  3 mL Inhalation 4x Daily Paige Doyle MD   Stopped at 11/11/20 1030    metoprolol succinate (TOPROL XL) extended release tablet 50 mg  50 mg Oral Daily Paige Doyle MD   50 mg at 11/11/20 8453    pantoprazole (PROTONIX) tablet 40 mg  40 mg Oral QAM AC Tyron Kay MD   40 mg at 11/11/20 0536    sodium bicarbonate tablet 650 mg  650 mg Oral TID Tyron Kay MD   650 mg at 11/11/20 1423    sodium chloride flush 0.9 % injection 10 mL  10 mL Intravenous 2 times per day Tyron Kay MD   10 mL at 11/10/20 2238    sodium chloride flush 0.9 % injection 10 mL  10 mL Intravenous PRN Tyron Kay MD        acetaminophen (TYLENOL) tablet 650 mg  650 mg Oral Q6H PRN Tyron Kay MD   650 mg at 11/11/20 1233    Or    acetaminophen (TYLENOL) suppository 650 mg  650 mg Rectal Q6H PRN Tyron Kay MD        polyethylene glycol (GLYCOLAX) packet 17 g  17 g Oral Daily PRN Tyron Kay MD        promethazine (PHENERGAN) tablet 12.5 mg  12.5 mg Oral Q6H PRN Tyron Kay MD        Or    ondansetron (ZOFRAN) injection 4 mg  4 mg Intravenous Q6H PRN Tyron Kay MD        heparin (porcine) injection 5,000 Units  5,000 Units Subcutaneous 3 times per day Tyron Kay MD   5,000 Units at 11/11/20 1423    0.9 % sodium chloride infusion   Intravenous Continuous Tyron Kay MD 50 mL/hr at 11/11/20 1210      methylPREDNISolone sodium (SOLU-MEDROL) injection 40 mg  40 mg Intravenous Q8H Tyron Kay MD   40 mg at 11/11/20 1423    azithromycin (ZITHROMAX) 500 mg in D5W 250ml addavial  500 mg Intravenous Q24H Tyron Kay MD   Stopped at 11/10/20 2337       Allergies:  Amino acids and Lisinopril    Social History:   reports that he has quit smoking. His smoking use included cigarettes. He has never used smokeless tobacco. He reports previous alcohol use of about 5.0 standard drinks of alcohol per week. He reports that he does not use drugs. Family History: family history includes Diabetes in his mother and sister; Heart Disease in his mother and sister. REVIEW OF SYSTEMS:    Constitutional: No fever or chills.  No night sweats, no weight loss   Eyes: No eye discharge, double vision, or eye pain   HEENT: negative for sore mouth, sore throat, hoarseness and voice change   Respiratory: ++ cough ,+ sputum, ++dyspnea, wheezing, hemoptysis, chest pain   Cardiovascular:++ chest pain, dyspnea, palpitations, orthopnea, PND   Gastrointestinal: negative for nausea, vomiting, diarrhea, constipation, abdominal pain, Dysphagia, hematemesis and hematochezia   Genitourinary: negative for frequency, dysuria, nocturia, urinary incontinence, and hematuria   Integument: negative for rash, skin lesions, bruises.    Hematologic/Lymphatic: negative for easy bruising, bleeding, lymphadenopathy, or petechiae   Endocrine: negative for heat or cold intolerance,weight changes, change in bowel habits and hair loss   Musculoskeletal: negative for myalgias, arthralgias, pain, joint swelling,and bone pain   Neurological: negative for headaches, dizziness, seizures, weakness, numbness    PHYSICAL EXAM:      BP (!) 144/80   Pulse 67   Temp 98.2 °F (36.8 °C) (Axillary)   Resp 18   Ht 5' 1\" (1.549 m)   Wt 126 lb 6 oz (57.3 kg)   SpO2 98%   BMI 23.88 kg/m²    Temp (24hrs), Av.1 °F (36.7 °C), Min:97.3 °F (36.3 °C), Max:98.8 °F (37.1 °C)    General appearance - well appearing, no in pain or distress   Mental status - alert and cooperative   Eyes - pupils equal and reactive, extraocular eye movements intact   Ears - bilateral TM's and external ear canals normal   Mouth - mucous membranes moist, pharynx normal without lesions   Neck - s/p trach   Lymphatics - no palpable lymphadenopathy, no hepatosplenomegaly   Chest - clear to auscultation, no wheezes, rales or rhonchi, symmetric air entry   Heart - normal rate, regular rhythm, normal S1, S2, no murmurs  Abdomen - soft, nontender, nondistended, no masses or organomegaly   Neurological - alert, oriented, normal speech, no focal findings or movement disorder noted   Musculoskeletal - no joint tenderness, deformity or swelling   Extremities - peripheral pulses normal, no pedal edema, no clubbing or cyanosis   Skin - normal coloration and turgor, no rashes, no suspicious skin lesions noted ,    DATA:    Labs:   CBC:   Recent Labs     11/10/20  1248 11/11/20  0451   WBC 6.4 7.1   HGB 9.1* 9.2*   HCT 28.2* 27.8*    275     BMP:   Recent Labs     11/10/20  1248 11/11/20  0451   * 128*   K 4.3 4.4   CO2 17* 18*   BUN 33* 32*   CREATININE 3.32* 3.21*   LABGLOM 19* 19*   GLUCOSE 82 130*     PT/INR: No results for input(s): PROTIME, INR in the last 72 hours. IMAGING DATA:  @IMG@   CT CHEST WO CONTRAST   Final Result   1. Large medial right upper lobe/superior mediastinal mass with mass effect   on the trachea and esophagus, concerning for neoplastic disease. 2.  Additional mildly enlarged right paratracheal lymph nodes may represent   disease involvement. 3.  Moderate size right pleural effusion. Dependent subsegmental atelectasis. XR CHEST PORTABLE   Final Result   Small right pleural effusion and adjacent airspace disease, likely   atelectasis. Right paratracheal masslike density. Further evaluation with   contrast-enhanced CT recommended. XR CHEST PORTABLE    (Results Pending)       Primary Problem  <principal problem not specified>    Active Hospital Problems    Diagnosis Date Noted    Severe malnutrition ([facility] [address] [E43] 11/11/2020    Mediastinal mass [J98.59] 11/10/2020         IMPRESSION:   1. 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  Oral cavity SCC, T4aN0 involving the anterior and left alveolus with erosion of the mandible as well as extension into the left floor of mouth and dorsal lateral tongue and is s/p left composite resection with segmental mandibulectomy (right mid body to the left angle), left floor of mouth, left partial glossectomy, bilateral neck dissection, left tonsillectomy, and scapular reconstruction, now with recurrent invasive keratinizing squamous cell carcinoma, moderately to poorly differentiated, p16 negative of left oropharynx. 3. Facial swelling  4. Shortness of breath  5. Alcohol abuse       RECOMMENDATIONS:  1. I reviewed the laboratory, imaging studies, diagnosis, prognosis and treatment options  2. Patient will be evaluated by ENT today  3. 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 Patient will need systemic chemotherapy as outpatient  5. He will need therapy radiation as outpatient as well  6. Follow-up with medical oncology radiation oncology as outpatient      Discussed with pt and Nurse. Thank you for asking us to see this patient. Stan Razo MD  Hematologist/Medical Oncologist    Cell: [phone]      This note is created with the assistance of a speech recognition program.  While intending to generate a document that actually reflects the content of the visit, the document can still have some errors including those of syntax and sound a like substitutions which may escape proof reading. It such instances, actual meaning can be extrapolated by contextual diversion.

## 2021-01-03 ENCOUNTER — APPOINTMENT (OUTPATIENT)
Dept: CT IMAGING | Age: 86
DRG: 177 | End: 2021-01-03
Payer: MEDICARE

## 2021-01-03 ENCOUNTER — HOSPITAL ENCOUNTER (INPATIENT)
Age: 86
LOS: 5 days | Discharge: HOME HEALTH | DRG: 177 | End: 2021-01-08
Attending: EMERGENCY MEDICINE | Admitting: INTERNAL MEDICINE
Payer: MEDICARE

## 2021-01-03 ENCOUNTER — APPOINTMENT (OUTPATIENT)
Dept: GENERAL RADIOLOGY | Age: 86
DRG: 177 | End: 2021-01-03
Payer: MEDICARE

## 2021-01-03 DIAGNOSIS — Z20.822 SUSPECTED COVID-19 VIRUS INFECTION: ICD-10-CM

## 2021-01-03 DIAGNOSIS — R11.2 NAUSEA VOMITING AND DIARRHEA: ICD-10-CM

## 2021-01-03 DIAGNOSIS — R19.7 NAUSEA VOMITING AND DIARRHEA: ICD-10-CM

## 2021-01-03 DIAGNOSIS — N30.00 ACUTE CYSTITIS WITHOUT HEMATURIA: Primary | ICD-10-CM

## 2021-01-03 DIAGNOSIS — N17.9 AKI (ACUTE KIDNEY INJURY) (HCC): ICD-10-CM

## 2021-01-03 PROBLEM — N39.0 UTI (URINARY TRACT INFECTION): Status: ACTIVE | Noted: 2021-01-03

## 2021-01-03 LAB
A/G RATIO: 0.7 (ref 1.1–2.2)
ALBUMIN SERPL-MCNC: 3.1 G/DL (ref 3.4–5)
ALP BLD-CCNC: 116 U/L (ref 40–129)
ALT SERPL-CCNC: 15 U/L (ref 10–40)
ANION GAP SERPL CALCULATED.3IONS-SCNC: 13 MMOL/L (ref 3–16)
APTT: 33.1 SEC (ref 24.2–36.2)
AST SERPL-CCNC: 27 U/L (ref 15–37)
BACTERIA: ABNORMAL /HPF
BASOPHILS ABSOLUTE: 0 K/UL (ref 0–0.2)
BASOPHILS RELATIVE PERCENT: 0.2 %
BILIRUB SERPL-MCNC: <0.2 MG/DL (ref 0–1)
BILIRUBIN URINE: ABNORMAL
BLOOD, URINE: ABNORMAL
BUN BLDV-MCNC: 24 MG/DL (ref 7–20)
CALCIUM SERPL-MCNC: 8.6 MG/DL (ref 8.3–10.6)
CHLORIDE BLD-SCNC: 95 MMOL/L (ref 99–110)
CLARITY: ABNORMAL
CO2: 25 MMOL/L (ref 21–32)
COLOR: YELLOW
COMMENT UA: ABNORMAL
CREAT SERPL-MCNC: 1.4 MG/DL (ref 0.6–1.2)
EOSINOPHILS ABSOLUTE: 0 K/UL (ref 0–0.6)
EOSINOPHILS RELATIVE PERCENT: 0 %
EPITHELIAL CELLS, UA: 1 /HPF (ref 0–5)
GFR AFRICAN AMERICAN: 43
GFR NON-AFRICAN AMERICAN: 35
GLOBULIN: 4.7 G/DL
GLUCOSE BLD-MCNC: 149 MG/DL (ref 70–99)
GLUCOSE URINE: NEGATIVE MG/DL
HCT VFR BLD CALC: 46.6 % (ref 36–48)
HEMOGLOBIN: 14.9 G/DL (ref 12–16)
INR BLD: 1.05 (ref 0.86–1.14)
KETONES, URINE: ABNORMAL MG/DL
LACTIC ACID, SEPSIS: 1.2 MMOL/L (ref 0.4–1.9)
LEUKOCYTE ESTERASE, URINE: ABNORMAL
LIPASE: 41 U/L (ref 13–60)
LYMPHOCYTES ABSOLUTE: 0.8 K/UL (ref 1–5.1)
LYMPHOCYTES RELATIVE PERCENT: 10.8 %
MCH RBC QN AUTO: 28.1 PG (ref 26–34)
MCHC RBC AUTO-ENTMCNC: 32.1 G/DL (ref 31–36)
MCV RBC AUTO: 87.5 FL (ref 80–100)
MICROSCOPIC EXAMINATION: YES
MONOCYTES ABSOLUTE: 0.7 K/UL (ref 0–1.3)
MONOCYTES RELATIVE PERCENT: 9.6 %
NEUTROPHILS ABSOLUTE: 5.7 K/UL (ref 1.7–7.7)
NEUTROPHILS RELATIVE PERCENT: 79.4 %
NITRITE, URINE: NEGATIVE
PDW BLD-RTO: 15.7 % (ref 12.4–15.4)
PH UA: 6 (ref 5–8)
PLATELET # BLD: 217 K/UL (ref 135–450)
PMV BLD AUTO: 9.7 FL (ref 5–10.5)
POTASSIUM SERPL-SCNC: 4.2 MMOL/L (ref 3.5–5.1)
PRO-BNP: 1142 PG/ML (ref 0–449)
PROCALCITONIN: 0.12 NG/ML (ref 0–0.15)
PROTEIN UA: 100 MG/DL
PROTHROMBIN TIME: 12.2 SEC (ref 10–13.2)
RBC # BLD: 5.33 M/UL (ref 4–5.2)
RBC UA: 19 /HPF (ref 0–4)
SODIUM BLD-SCNC: 133 MMOL/L (ref 136–145)
SPECIFIC GRAVITY UA: 1.02 (ref 1–1.03)
TOTAL CK: 97 U/L (ref 26–192)
TOTAL PROTEIN: 7.8 G/DL (ref 6.4–8.2)
TROPONIN: 0.01 NG/ML
URINE REFLEX TO CULTURE: YES
URINE TYPE: ABNORMAL
UROBILINOGEN, URINE: 1 E.U./DL
WBC # BLD: 7.1 K/UL (ref 4–11)
WBC UA: >900 /HPF (ref 0–5)

## 2021-01-03 PROCEDURE — 71045 X-RAY EXAM CHEST 1 VIEW: CPT

## 2021-01-03 PROCEDURE — 80053 COMPREHEN METABOLIC PANEL: CPT

## 2021-01-03 PROCEDURE — 87086 URINE CULTURE/COLONY COUNT: CPT

## 2021-01-03 PROCEDURE — 87040 BLOOD CULTURE FOR BACTERIA: CPT

## 2021-01-03 PROCEDURE — 85730 THROMBOPLASTIN TIME PARTIAL: CPT

## 2021-01-03 PROCEDURE — 85025 COMPLETE CBC W/AUTO DIFF WBC: CPT

## 2021-01-03 PROCEDURE — 83690 ASSAY OF LIPASE: CPT

## 2021-01-03 PROCEDURE — 99284 EMERGENCY DEPT VISIT MOD MDM: CPT

## 2021-01-03 PROCEDURE — 87186 SC STD MICRODIL/AGAR DIL: CPT

## 2021-01-03 PROCEDURE — 1200000000 HC SEMI PRIVATE

## 2021-01-03 PROCEDURE — 85610 PROTHROMBIN TIME: CPT

## 2021-01-03 PROCEDURE — 6360000002 HC RX W HCPCS: Performed by: PHYSICIAN ASSISTANT

## 2021-01-03 PROCEDURE — 81001 URINALYSIS AUTO W/SCOPE: CPT

## 2021-01-03 PROCEDURE — 36415 COLL VENOUS BLD VENIPUNCTURE: CPT

## 2021-01-03 PROCEDURE — 84145 PROCALCITONIN (PCT): CPT

## 2021-01-03 PROCEDURE — 87088 URINE BACTERIA CULTURE: CPT

## 2021-01-03 PROCEDURE — 83880 ASSAY OF NATRIURETIC PEPTIDE: CPT

## 2021-01-03 PROCEDURE — 83605 ASSAY OF LACTIC ACID: CPT

## 2021-01-03 PROCEDURE — 87077 CULTURE AEROBIC IDENTIFY: CPT

## 2021-01-03 PROCEDURE — 2580000003 HC RX 258: Performed by: PHYSICIAN ASSISTANT

## 2021-01-03 PROCEDURE — 82550 ASSAY OF CK (CPK): CPT

## 2021-01-03 PROCEDURE — U0003 INFECTIOUS AGENT DETECTION BY NUCLEIC ACID (DNA OR RNA); SEVERE ACUTE RESPIRATORY SYNDROME CORONAVIRUS 2 (SARS-COV-2) (CORONAVIRUS DISEASE [COVID-19]), AMPLIFIED PROBE TECHNIQUE, MAKING USE OF HIGH THROUGHPUT TECHNOLOGIES AS DESCRIBED BY CMS-2020-01-R: HCPCS

## 2021-01-03 PROCEDURE — 93005 ELECTROCARDIOGRAM TRACING: CPT | Performed by: PHYSICIAN ASSISTANT

## 2021-01-03 PROCEDURE — 84484 ASSAY OF TROPONIN QUANT: CPT

## 2021-01-03 PROCEDURE — 74176 CT ABD & PELVIS W/O CONTRAST: CPT

## 2021-01-03 RX ORDER — IPRATROPIUM BROMIDE AND ALBUTEROL SULFATE 2.5; .5 MG/3ML; MG/3ML
1 SOLUTION RESPIRATORY (INHALATION)
Status: DISCONTINUED | OUTPATIENT
Start: 2021-01-04 | End: 2021-01-04

## 2021-01-03 RX ORDER — SODIUM CHLORIDE 0.9 % (FLUSH) 0.9 %
10 SYRINGE (ML) INJECTION PRN
Status: DISCONTINUED | OUTPATIENT
Start: 2021-01-03 | End: 2021-01-08 | Stop reason: HOSPADM

## 2021-01-03 RX ORDER — ACETAMINOPHEN 325 MG/1
650 TABLET ORAL EVERY 6 HOURS PRN
Status: DISCONTINUED | OUTPATIENT
Start: 2021-01-03 | End: 2021-01-08 | Stop reason: HOSPADM

## 2021-01-03 RX ORDER — PROMETHAZINE HYDROCHLORIDE 25 MG/1
12.5 TABLET ORAL EVERY 6 HOURS PRN
Status: DISCONTINUED | OUTPATIENT
Start: 2021-01-03 | End: 2021-01-08 | Stop reason: HOSPADM

## 2021-01-03 RX ORDER — DONEPEZIL HYDROCHLORIDE 5 MG/1
5 TABLET, FILM COATED ORAL NIGHTLY
Status: DISCONTINUED | OUTPATIENT
Start: 2021-01-03 | End: 2021-01-08 | Stop reason: HOSPADM

## 2021-01-03 RX ORDER — SODIUM CHLORIDE 0.9 % (FLUSH) 0.9 %
10 SYRINGE (ML) INJECTION EVERY 12 HOURS SCHEDULED
Status: DISCONTINUED | OUTPATIENT
Start: 2021-01-03 | End: 2021-01-08 | Stop reason: HOSPADM

## 2021-01-03 RX ORDER — LORATADINE 10 MG/1
10 TABLET ORAL DAILY
COMMUNITY

## 2021-01-03 RX ORDER — POTASSIUM CHLORIDE 750 MG/1
20 TABLET, FILM COATED, EXTENDED RELEASE ORAL DAILY
Status: DISCONTINUED | OUTPATIENT
Start: 2021-01-04 | End: 2021-01-08 | Stop reason: HOSPADM

## 2021-01-03 RX ORDER — POTASSIUM CHLORIDE 1.5 G/1.77G
20 POWDER, FOR SOLUTION ORAL DAILY
COMMUNITY

## 2021-01-03 RX ORDER — VENLAFAXINE 37.5 MG/1
75 TABLET ORAL DAILY
Status: DISCONTINUED | OUTPATIENT
Start: 2021-01-04 | End: 2021-01-08 | Stop reason: HOSPADM

## 2021-01-03 RX ORDER — SODIUM CHLORIDE, SODIUM LACTATE, POTASSIUM CHLORIDE, CALCIUM CHLORIDE 600; 310; 30; 20 MG/100ML; MG/100ML; MG/100ML; MG/100ML
1000 INJECTION, SOLUTION INTRAVENOUS ONCE
Status: COMPLETED | OUTPATIENT
Start: 2021-01-03 | End: 2021-01-03

## 2021-01-03 RX ORDER — 0.9 % SODIUM CHLORIDE 0.9 %
500 INTRAVENOUS SOLUTION INTRAVENOUS PRN
Status: DISCONTINUED | OUTPATIENT
Start: 2021-01-03 | End: 2021-01-08 | Stop reason: HOSPADM

## 2021-01-03 RX ORDER — TRAZODONE HYDROCHLORIDE 50 MG/1
50 TABLET ORAL NIGHTLY
Status: DISCONTINUED | OUTPATIENT
Start: 2021-01-03 | End: 2021-01-08 | Stop reason: HOSPADM

## 2021-01-03 RX ORDER — CETIRIZINE HYDROCHLORIDE 10 MG/1
10 TABLET ORAL DAILY
Status: DISCONTINUED | OUTPATIENT
Start: 2021-01-04 | End: 2021-01-08 | Stop reason: HOSPADM

## 2021-01-03 RX ORDER — SODIUM CHLORIDE 9 MG/ML
INJECTION, SOLUTION INTRAVENOUS CONTINUOUS
Status: DISCONTINUED | OUTPATIENT
Start: 2021-01-03 | End: 2021-01-08 | Stop reason: HOSPADM

## 2021-01-03 RX ORDER — HYDRALAZINE HYDROCHLORIDE 20 MG/ML
10 INJECTION INTRAMUSCULAR; INTRAVENOUS EVERY 6 HOURS PRN
Status: DISCONTINUED | OUTPATIENT
Start: 2021-01-03 | End: 2021-01-08 | Stop reason: HOSPADM

## 2021-01-03 RX ORDER — LORATADINE 10 MG/1
10 TABLET ORAL DAILY
Status: DISCONTINUED | OUTPATIENT
Start: 2021-01-04 | End: 2021-01-03 | Stop reason: CLARIF

## 2021-01-03 RX ORDER — DONEPEZIL HYDROCHLORIDE 5 MG/1
5 TABLET, FILM COATED ORAL NIGHTLY
COMMUNITY

## 2021-01-03 RX ORDER — ONDANSETRON 2 MG/ML
4 INJECTION INTRAMUSCULAR; INTRAVENOUS EVERY 6 HOURS PRN
Status: DISCONTINUED | OUTPATIENT
Start: 2021-01-03 | End: 2021-01-08 | Stop reason: HOSPADM

## 2021-01-03 RX ORDER — GABAPENTIN 100 MG/1
100 CAPSULE ORAL 3 TIMES DAILY
Status: DISCONTINUED | OUTPATIENT
Start: 2021-01-03 | End: 2021-01-08 | Stop reason: HOSPADM

## 2021-01-03 RX ORDER — GABAPENTIN 100 MG/1
100 CAPSULE ORAL 3 TIMES DAILY
COMMUNITY

## 2021-01-03 RX ORDER — POTASSIUM CHLORIDE 7.45 MG/ML
10 INJECTION INTRAVENOUS PRN
Status: DISCONTINUED | OUTPATIENT
Start: 2021-01-03 | End: 2021-01-08 | Stop reason: HOSPADM

## 2021-01-03 RX ORDER — TRAZODONE HYDROCHLORIDE 50 MG/1
50 TABLET ORAL NIGHTLY
COMMUNITY

## 2021-01-03 RX ORDER — DEXAMETHASONE SODIUM PHOSPHATE 10 MG/ML
6 INJECTION, SOLUTION INTRAMUSCULAR; INTRAVENOUS DAILY
Status: DISCONTINUED | OUTPATIENT
Start: 2021-01-03 | End: 2021-01-08 | Stop reason: HOSPADM

## 2021-01-03 RX ORDER — ACETAMINOPHEN 650 MG/1
650 SUPPOSITORY RECTAL EVERY 6 HOURS PRN
Status: DISCONTINUED | OUTPATIENT
Start: 2021-01-03 | End: 2021-01-08 | Stop reason: HOSPADM

## 2021-01-03 RX ORDER — POTASSIUM CHLORIDE 20 MEQ/1
40 TABLET, EXTENDED RELEASE ORAL PRN
Status: DISCONTINUED | OUTPATIENT
Start: 2021-01-03 | End: 2021-01-08 | Stop reason: HOSPADM

## 2021-01-03 RX ADMIN — SODIUM CHLORIDE, POTASSIUM CHLORIDE, SODIUM LACTATE AND CALCIUM CHLORIDE 1000 ML: 600; 310; 30; 20 INJECTION, SOLUTION INTRAVENOUS at 18:35

## 2021-01-03 RX ADMIN — Medication 1 G: at 20:27

## 2021-01-03 RX ADMIN — AZITHROMYCIN MONOHYDRATE 500 MG: 500 INJECTION, POWDER, LYOPHILIZED, FOR SOLUTION INTRAVENOUS at 20:45

## 2021-01-03 ASSESSMENT — ENCOUNTER SYMPTOMS
SHORTNESS OF BREATH: 0
RHINORRHEA: 0
COUGH: 1
NAUSEA: 0
DIARRHEA: 1
ABDOMINAL PAIN: 0
VOMITING: 1

## 2021-01-04 ENCOUNTER — APPOINTMENT (OUTPATIENT)
Dept: GENERAL RADIOLOGY | Age: 86
DRG: 177 | End: 2021-01-04
Payer: MEDICARE

## 2021-01-04 PROBLEM — J96.00 ACUTE RESPIRATORY FAILURE DUE TO COVID-19 (HCC): Status: ACTIVE | Noted: 2021-01-04

## 2021-01-04 PROBLEM — U07.1 ACUTE RESPIRATORY FAILURE DUE TO COVID-19 (HCC): Status: ACTIVE | Noted: 2021-01-04

## 2021-01-04 LAB
A/G RATIO: 0.7 (ref 1.1–2.2)
ABO/RH: NORMAL
ALBUMIN SERPL-MCNC: 2.8 G/DL (ref 3.4–5)
ALP BLD-CCNC: 102 U/L (ref 40–129)
ALT SERPL-CCNC: 12 U/L (ref 10–40)
ANION GAP SERPL CALCULATED.3IONS-SCNC: 11 MMOL/L (ref 3–16)
ANTIBODY SCREEN: NORMAL
AST SERPL-CCNC: 27 U/L (ref 15–37)
BASOPHILS ABSOLUTE: 0 K/UL (ref 0–0.2)
BASOPHILS RELATIVE PERCENT: 0.2 %
BILIRUB SERPL-MCNC: <0.2 MG/DL (ref 0–1)
BLOOD BANK DISPENSE STATUS: NORMAL
BLOOD BANK PRODUCT CODE: NORMAL
BPU ID: NORMAL
BUN BLDV-MCNC: 22 MG/DL (ref 7–20)
C-REACTIVE PROTEIN: 59.8 MG/L (ref 0–5.1)
CALCIUM SERPL-MCNC: 8.4 MG/DL (ref 8.3–10.6)
CHLORIDE BLD-SCNC: 98 MMOL/L (ref 99–110)
CO2: 26 MMOL/L (ref 21–32)
CREAT SERPL-MCNC: 1.2 MG/DL (ref 0.6–1.2)
D DIMER: 596 NG/ML DDU (ref 0–229)
DESCRIPTION BLOOD BANK: NORMAL
EKG ATRIAL RATE: 138 BPM
EKG DIAGNOSIS: NORMAL
EKG Q-T INTERVAL: 310 MS
EKG QRS DURATION: 68 MS
EKG QTC CALCULATION (BAZETT): 434 MS
EKG R AXIS: 30 DEGREES
EKG T AXIS: 19 DEGREES
EKG VENTRICULAR RATE: 118 BPM
EOSINOPHILS ABSOLUTE: 0 K/UL (ref 0–0.6)
EOSINOPHILS RELATIVE PERCENT: 0.1 %
FERRITIN: 90.3 NG/ML (ref 15–150)
FIBRINOGEN: 471 MG/DL (ref 200–397)
GFR AFRICAN AMERICAN: 51
GFR NON-AFRICAN AMERICAN: 42
GLOBULIN: 4.2 G/DL
GLUCOSE BLD-MCNC: 121 MG/DL (ref 70–99)
HCT VFR BLD CALC: 45.1 % (ref 36–48)
HEMOGLOBIN: 14.4 G/DL (ref 12–16)
LACTIC ACID: 1.2 MMOL/L (ref 0.4–2)
LYMPHOCYTES ABSOLUTE: 0.5 K/UL (ref 1–5.1)
LYMPHOCYTES RELATIVE PERCENT: 7.6 %
MCH RBC QN AUTO: 27.9 PG (ref 26–34)
MCHC RBC AUTO-ENTMCNC: 32 G/DL (ref 31–36)
MCV RBC AUTO: 87.2 FL (ref 80–100)
MONOCYTES ABSOLUTE: 0.2 K/UL (ref 0–1.3)
MONOCYTES RELATIVE PERCENT: 3.6 %
NEUTROPHILS ABSOLUTE: 5.9 K/UL (ref 1.7–7.7)
NEUTROPHILS RELATIVE PERCENT: 88.5 %
PDW BLD-RTO: 15.5 % (ref 12.4–15.4)
PLATELET # BLD: 205 K/UL (ref 135–450)
PMV BLD AUTO: 9.9 FL (ref 5–10.5)
POTASSIUM REFLEX MAGNESIUM: 4.5 MMOL/L (ref 3.5–5.1)
PROCALCITONIN: 0.17 NG/ML (ref 0–0.15)
RBC # BLD: 5.17 M/UL (ref 4–5.2)
SARS-COV-2: DETECTED
SODIUM BLD-SCNC: 135 MMOL/L (ref 136–145)
TOTAL PROTEIN: 7 G/DL (ref 6.4–8.2)
WBC # BLD: 6.6 K/UL (ref 4–11)

## 2021-01-04 PROCEDURE — 83605 ASSAY OF LACTIC ACID: CPT

## 2021-01-04 PROCEDURE — 86140 C-REACTIVE PROTEIN: CPT

## 2021-01-04 PROCEDURE — 94760 N-INVAS EAR/PLS OXIMETRY 1: CPT

## 2021-01-04 PROCEDURE — XW13325 TRANSFUSION OF CONVALESCENT PLASMA (NONAUTOLOGOUS) INTO PERIPHERAL VEIN, PERCUTANEOUS APPROACH, NEW TECHNOLOGY GROUP 5: ICD-10-PCS | Performed by: INTERNAL MEDICINE

## 2021-01-04 PROCEDURE — 6360000002 HC RX W HCPCS: Performed by: INTERNAL MEDICINE

## 2021-01-04 PROCEDURE — 6370000000 HC RX 637 (ALT 250 FOR IP): Performed by: INTERNAL MEDICINE

## 2021-01-04 PROCEDURE — 86901 BLOOD TYPING SEROLOGIC RH(D): CPT

## 2021-01-04 PROCEDURE — 85379 FIBRIN DEGRADATION QUANT: CPT

## 2021-01-04 PROCEDURE — 85384 FIBRINOGEN ACTIVITY: CPT

## 2021-01-04 PROCEDURE — 36415 COLL VENOUS BLD VENIPUNCTURE: CPT

## 2021-01-04 PROCEDURE — 86850 RBC ANTIBODY SCREEN: CPT

## 2021-01-04 PROCEDURE — 2700000000 HC OXYGEN THERAPY PER DAY

## 2021-01-04 PROCEDURE — 80053 COMPREHEN METABOLIC PANEL: CPT

## 2021-01-04 PROCEDURE — 2500000003 HC RX 250 WO HCPCS: Performed by: INTERNAL MEDICINE

## 2021-01-04 PROCEDURE — 2580000003 HC RX 258: Performed by: INTERNAL MEDICINE

## 2021-01-04 PROCEDURE — 36430 TRANSFUSION BLD/BLD COMPNT: CPT

## 2021-01-04 PROCEDURE — 93010 ELECTROCARDIOGRAM REPORT: CPT | Performed by: INTERNAL MEDICINE

## 2021-01-04 PROCEDURE — 94640 AIRWAY INHALATION TREATMENT: CPT

## 2021-01-04 PROCEDURE — XW033E5 INTRODUCTION OF REMDESIVIR ANTI-INFECTIVE INTO PERIPHERAL VEIN, PERCUTANEOUS APPROACH, NEW TECHNOLOGY GROUP 5: ICD-10-PCS | Performed by: INTERNAL MEDICINE

## 2021-01-04 PROCEDURE — 1200000000 HC SEMI PRIVATE

## 2021-01-04 PROCEDURE — 82728 ASSAY OF FERRITIN: CPT

## 2021-01-04 PROCEDURE — 84145 PROCALCITONIN (PCT): CPT

## 2021-01-04 PROCEDURE — 86900 BLOOD TYPING SEROLOGIC ABO: CPT

## 2021-01-04 PROCEDURE — 85025 COMPLETE CBC W/AUTO DIFF WBC: CPT

## 2021-01-04 PROCEDURE — 71045 X-RAY EXAM CHEST 1 VIEW: CPT

## 2021-01-04 RX ORDER — ALBUTEROL SULFATE 90 UG/1
2 AEROSOL, METERED RESPIRATORY (INHALATION) 4 TIMES DAILY
Status: DISCONTINUED | OUTPATIENT
Start: 2021-01-04 | End: 2021-01-08 | Stop reason: HOSPADM

## 2021-01-04 RX ORDER — 0.9 % SODIUM CHLORIDE 0.9 %
30 INTRAVENOUS SOLUTION INTRAVENOUS PRN
Status: DISCONTINUED | OUTPATIENT
Start: 2021-01-04 | End: 2021-01-08 | Stop reason: HOSPADM

## 2021-01-04 RX ORDER — SODIUM CHLORIDE 9 MG/ML
INJECTION, SOLUTION INTRAVENOUS PRN
Status: DISCONTINUED | OUTPATIENT
Start: 2021-01-04 | End: 2021-01-08 | Stop reason: HOSPADM

## 2021-01-04 RX ADMIN — DEXAMETHASONE SODIUM PHOSPHATE 6 MG: 10 INJECTION, SOLUTION INTRAMUSCULAR; INTRAVENOUS at 08:07

## 2021-01-04 RX ADMIN — DONEPEZIL HYDROCHLORIDE 5 MG: 5 TABLET, FILM COATED ORAL at 22:14

## 2021-01-04 RX ADMIN — REMDESIVIR 200 MG: 100 INJECTION, POWDER, LYOPHILIZED, FOR SOLUTION INTRAVENOUS at 12:01

## 2021-01-04 RX ADMIN — GABAPENTIN 100 MG: 100 CAPSULE ORAL at 14:17

## 2021-01-04 RX ADMIN — DEXAMETHASONE SODIUM PHOSPHATE 6 MG: 10 INJECTION, SOLUTION INTRAMUSCULAR; INTRAVENOUS at 01:22

## 2021-01-04 RX ADMIN — Medication 2 PUFF: at 16:13

## 2021-01-04 RX ADMIN — Medication 2 PUFF: at 22:07

## 2021-01-04 RX ADMIN — GABAPENTIN 100 MG: 100 CAPSULE ORAL at 22:14

## 2021-01-04 RX ADMIN — VENLAFAXINE 75 MG: 37.5 TABLET ORAL at 08:08

## 2021-01-04 RX ADMIN — Medication 10 ML: at 22:15

## 2021-01-04 RX ADMIN — Medication 2 PUFF: at 16:12

## 2021-01-04 RX ADMIN — TRAZODONE HYDROCHLORIDE 50 MG: 50 TABLET ORAL at 22:14

## 2021-01-04 RX ADMIN — Medication 10 ML: at 01:23

## 2021-01-04 RX ADMIN — Medication 2 PUFF: at 08:50

## 2021-01-04 RX ADMIN — Medication 2 PUFF: at 08:49

## 2021-01-04 RX ADMIN — TRAZODONE HYDROCHLORIDE 50 MG: 50 TABLET ORAL at 01:22

## 2021-01-04 RX ADMIN — Medication 1 G: at 22:14

## 2021-01-04 RX ADMIN — DONEPEZIL HYDROCHLORIDE 5 MG: 5 TABLET, FILM COATED ORAL at 01:22

## 2021-01-04 RX ADMIN — GABAPENTIN 100 MG: 100 CAPSULE ORAL at 08:08

## 2021-01-04 RX ADMIN — ENOXAPARIN SODIUM 40 MG: 40 INJECTION SUBCUTANEOUS at 22:15

## 2021-01-04 RX ADMIN — GABAPENTIN 100 MG: 100 CAPSULE ORAL at 01:22

## 2021-01-04 RX ADMIN — Medication 2 PUFF: at 22:05

## 2021-01-04 RX ADMIN — ENOXAPARIN SODIUM 40 MG: 40 INJECTION SUBCUTANEOUS at 08:07

## 2021-01-04 RX ADMIN — SODIUM CHLORIDE: 9 INJECTION, SOLUTION INTRAVENOUS at 01:22

## 2021-01-04 RX ADMIN — POTASSIUM CHLORIDE 20 MEQ: 750 TABLET, FILM COATED, EXTENDED RELEASE ORAL at 08:08

## 2021-01-04 RX ADMIN — CETIRIZINE HYDROCHLORIDE 10 MG: 10 TABLET, FILM COATED ORAL at 08:08

## 2021-01-04 ASSESSMENT — ENCOUNTER SYMPTOMS
APNEA: 0
COUGH: 1
DIARRHEA: 1
VOMITING: 1
NAUSEA: 0
EYE REDNESS: 0
EYE PAIN: 0
BACK PAIN: 0

## 2021-01-04 NOTE — PROGRESS NOTES
Speech Language Pathology  Aspiration Screen    Name: Eulalia Lieberman  : 1931  Medical Diagnosis: UTI (urinary tract infection) [N39.0]    Swallow screen to rule out aspiration completed per pneumonia protocol. Patient demonstrates some high risk indicators for potential dysphagia / aspiration per swallow screen. RECOMMEND: Clinical Swallow Evaluation at bedside to assess swallowing function, rule out aspiration, and determine appropriate diet level.      Janae Christianson, [facility]  Speech Language Pathologist

## 2021-01-04 NOTE — PROGRESS NOTES
Patient was has hx of dementia - will be calling daughter for CCP consent once consent has been printed out. VSS are stable and are as charted.

## 2021-01-04 NOTE — ACP (ADVANCE CARE PLANNING)
Advance Care Planning     General Advance Care Planning (ACP) Conversation    Date of Conversation: 1/3/2021  Conducted with: Patient with Decision Making Capacity   Healthcare Decision Maker: Next of Kin by law (only applies in absence of above) (name) Ole Mckeon Carson dthemant    Healthcare Decision Maker:      Primary Decision Maker: Marc Elizabeth - Child - [phone]      Today we documented Decision Maker(s) consistent with Legal Next of Kin hierarchy.     Content/Action Overview:  Has NO ACP documents/care preferences - requested patient complete ACP documents  Reviewed DNR/DNI and patient elects Full Code (Attempt Resuscitation)    Length of Voluntary ACP Conversation in minutes:  21 minutes    Jess Nicole    Electronically signed by CHEY Pop, GAILW on 1/4/2021 at 4:31 PM

## 2021-01-04 NOTE — PROGRESS NOTES
Blood consent obtained by POA (Daughter) with double RN sign off via telephone. Consent was put into chart.

## 2021-01-04 NOTE — CARE COORDINATION
Discharge Planning Assessment    SW discharge planner met with patient to discuss reason for admission, current living situation, and potential needs at the time of discharge. Demographics/Insurance verified:  Yes    Current type of dwelling:  [employer] - 1 step in - pt normally ok w/this. Living arrangements:  W/grandson and grandson's spouse    Level of function/Support:  Pt has dementia. Dtr reported grandson and his spouse are \"doing great\" taking care of pt at home. Stated pt uses her walker at all times. Pt has had difficulty transferring the last couple of weeks and needed physical assistance which grandson was able to provide but getting difficult. Dtr was looking into getting  Lift Chair. PCP:  Viviane    Last Visit to PCP:  Virtual Visit in September    DME:  Oxygen PRN (unsure of agency or if just has a concentrator. Pt is NOT active w/[facility] per Clancy Osler), walker. Dtr looking into getting a Lift chair. SW informed insurance does not assist with this. Dtr reported pt needs a wheelchair. Referral made to Clancy Osler w/[facility] for this. Active with any community resources/agencies/skilled home care:  None. If pt discharges home, may need COA referral.    Medication compliance issues:  No    Financial issues that could impact healthcare:  No    Tentative discharge plan:  Unsure at this time. PT/OT pending. Dtr reported she would prefer pt discharge home. Possible [facility] referral needed. If home, fax COA Fast Track referral with facesheet and pt's height & weight to [phone],  is Kimo Renee [phone]. SW to follow.     Transportation at the time of discharge:  If home, dtr can assist.    Electronically signed by CHEY Pop, GAILW on 1/4/2021 at 4:29 PM

## 2021-01-04 NOTE — PROGRESS NOTES
I have discussed with the patient the rationale for blood transfusion, its benefits in treating or preventing covid which could lead to fatigue, organ damage or death, and its risk which include: mild transfusion reactions, rare risk of blood borne infection, or more serious but rare allergic reactions. I have discussed the alternatives to transfusion, including the risk and consequences of not receiving transfusion. The patient had an opportunity to ask questions and had agreed to proceed with transfusion of convalescent plasma.

## 2021-01-04 NOTE — PROGRESS NOTES
Disaster admission completed with Pt's daughter on the phone. Daughter states it is okay to call her at any time with any updates about the Pt.

## 2021-01-04 NOTE — H&P
History and Physical  Dr. Kalie Avendaño  1/4/2021    PCP: Chey Pan DO    Cc:   Chief Complaint   Patient presents with    Fatigue     Pt arrived via [facility] from home for report of weakness/fatigue, vomiting, diarrhea, and poor PO intake x3 days. HPI:  Kennedi Arias is a 80 y.o. female who has a past medical history of Arthritis, CHF (congestive heart failure) ([address] COPD (chronic obstructive pulmonary disease) ([address] Dementia ([address] Depression, and Hypertension. Patient presents with UTI (urinary tract infection). HPI      80 y.o. female presents to the emergency department today with her daughter for concerns of general fatigue. The patient does have a history of dementia, and she is otherwise acting at baseline. The patient apparently lives with her grandson and his wife. The daughter is at bedside and gives most of the history she states that over the past 3 days the patient has had general fatigue, decreasing appetite, cough, vomiting and diarrhea. She states that the patient had a fever of 100 today, she is afebrile when she arrives to the ED. Patient is started with vomiting today, there is been no reports of any bilious emesis, hematemesis reported emesis. The patient has had multiple episodes of diarrhea, there have been no blood in the stool black tarry appearance to stool. Her cough has been dry. There is been no sputum production or hemoptysis. The patient has not had any urinary symptoms. She is acting at baseline. Her grandson and his wife are sick as well, and are being tested for COVID-19. Given her sx, high suspicison for pt to also have covid  Swab done in ER  Results pending  Pt with 5L o2 requirement  U/A in Er positive for UTI    Problem list of hospitalization thus far:   Active Hospital Problems    Diagnosis    UTI (urinary tract infection) [N39.0]    Suspected COVID-19 virus infection [Z20.822]    HTN (hypertension) [I10]    PRABHA (acute kidney injury) ([address] [N17.9]         Review of Systems: (1 system for EPF, 2-9 for detailed, 10+ for comprehensive)  Review of Systems   Constitutional: Positive for appetite change. Negative for chills and fever. HENT: Negative for ear discharge and ear pain. Eyes: Negative for pain and redness. Respiratory: Positive for cough. Negative for apnea. Cardiovascular: Negative for chest pain and leg swelling. Gastrointestinal: Positive for diarrhea and vomiting. Negative for nausea. Endocrine: Negative for cold intolerance and heat intolerance. Genitourinary: Negative for dysuria and frequency. Musculoskeletal: Negative for arthralgias and back pain. Skin: Negative for rash. Allergic/Immunologic: Negative for food allergies. Neurological: Negative for syncope and light-headedness. Hematological: Does not bruise/bleed easily. Psychiatric/Behavioral: Negative for dysphoric mood and self-injury. Past Medical History:   Past Medical History:   Diagnosis Date    Arthritis     CHF (congestive heart failure) [facility]     COPD [facility]ch[facility] disease) [facility]     Dementia ([facility])     Depression     Hypertension        Past Surgical History:   Past Surgical History:   Procedure Laterality Date    HYSTERECTOMY         Social History:   Social History     Tobacco History     Smoking Status  Former Smoker    Smokeless Tobacco Use  Never Used    Tobacco Comment  7 years          Alcohol History     Alcohol Use Status  No          Drug Use     Drug Use Status  Not Asked          Sexual Activity     Sexually Active  Not Asked                Fam History: History reviewed. No pertinent family history. PFSH: The above PMHx, PSHx, SocHx, FamHx has been reviewed by myself.  (1 area for detailed, 2-3 for comprehensive)      Code Status: Full Code    Meds - following list of home medications fromelectronic chart has been reviewed by myself  Prior to Admission medications    Medication Sig Start Date End Date irreg irreg, normal S1 and S2 and no carotid bruits  Gastrointestinal: soft, non-tender, non-distended, normal bowel sounds, no masses or organomegaly  Lymphatic:   Extremities: no edema, no clubbing  Skin:No rashes or nodules noted.   Neurologic:    LABS:  Labs Reviewed   CBC WITH AUTO DIFFERENTIAL - Abnormal; Notable for the following components:       Result Value    RBC 5.33 (*)     RDW 15.7 (*)     Lymphocytes Absolute 0.8 (*)     All other components within normal limits    Narrative:     Performed at:  [facility] [address], [place], [address]   Phone [phone]   COMPREHENSIVE METABOLIC PANEL - Abnormal; Notable for the following components:    Sodium 133 (*)     Chloride 95 (*)     Glucose 149 (*)     BUN 24 (*)     CREATININE 1.4 (*)     GFR Non- 35 (*)     GFR  43 (*)     Alb 3.1 (*)     Albumin/Globulin Ratio 0.7 (*)     All other components within normal limits    Narrative:     Performed at:  [facility] [address], [place], [address]   Phone [phone]   URINE RT REFLEX TO CULTURE - Abnormal; Notable for the following components:    Clarity, UA TURBID (*)     Bilirubin Urine SMALL (*)     Ketones, Urine TRACE (*)     Blood, Urine MODERATE (*)     Protein,  (*)     Leukocyte Esterase, Urine LARGE (*)     All other components within normal limits    Narrative:     Performed at:  [facility] [address], [place], [address]   Phone [phone]  - Abnormal; Notable for the following components:    Pro-BNP 1,142 (*)     All other components within normal limits    Narrative:     Performed at:  [facility] [address],  [place] [address] [address]   Phone [phone]   MICROSCOPIC URINALYSIS - Abnormal; Notable for the following components:    Bacteria, UA 3+ (*)     WBC, UA >900 (*) RBC, UA 19 (*)     All other components within normal limits    Narrative:     Performed at:  [facility] [address]  [employer]   Phone [phone]   COMPREHENSIVE METABOLIC PANEL W/ REFLEX TO MG FOR LOW K - Abnormal; Notable for the following components:    Sodium 135 (*)     Chloride 98 (*)     Glucose 121 (*)     BUN 22 (*)     GFR Non- 42 (*)     GFR  51 (*)     Alb 2.8 (*)     Albumin/Globulin Ratio 0.7 (*)     All other components within normal limits    Narrative:     Performed at:  [facility] [address]  [employer]   Phone [phone]   PROCALCITONIN - Abnormal; Notable for the following components:    Procalcitonin 0.17 (*)     All other components within normal limits    Narrative:     Performed at:  [facility] [address]  [employer]   Phone [phone]   CBC WITH AUTO DIFFERENTIAL - Abnormal; Notable for the following components:    RDW 15.5 (*)     Lymphocytes Absolute 0.5 (*)     All other components within normal limits    Narrative:     Performed at:  [facility] [address] [employer] [employer]   Phone [phone]   FIBRINOGEN - Abnormal; Notable for the following components:    Fibrinogen 471 (*)     All other components within normal limits    Narrative:     Performed at:  [facility] [address] [employer]   Phone [phone]   D-DIMER, QUANTITATIVE - Abnormal; Notable for the following components:    D-Dimer, Quant 596 (*)     All other components within normal limits    Narrative:     Performed at:  [facility] [address]  [employer]   Phone [phone]   CULTURE, BLOOD 2   CULTURE, BLOOD 1   CULTURE, URINE   CULTURE, RESPIRATORY   LIPASE    Narrative:     Performed at:  [facility]  [address] [place], [address]   Phone [phone]   TROPONIN    Narrative:     Performed at:  [facility]  [address], [address]   Phone [phone]   APTT    Narrative:     Performed at:  [facility]  [address],  [place], [address]   Phone [phone]   PROTIME-INR    Narrative:     Performed at:  [facility]  [address] [place],  [place], [address]   Phone [phone]   LACTATE, SEPSIS    Narrative:     Performed at:  [facility]  [address] [place], [address]   Phone [phone]   PROCALCITONIN    Narrative:     Performed at:  [facility] [address] [place],  [place], [address]   Phone [phone]   CK    Narrative:     Performed at:  [facility] [address] [place], [address]   Phone [phone]   LACTIC ACID, PLASMA    Narrative:     Performed at:  [facility]  [address] [place], [address]   Phone [phone]   C-REACTIVE PROTEIN   FERRITIN   URINE RT REFLEX TO CULTURE         IMAGING:  Imaging results from the ER have been reviewed in the computerized chart. Xr Chest Portable    Result Date: 1/4/2021  EXAMINATION: ONE XRAY VIEW OF THE CHEST 1/4/2021 6:53 am COMPARISON: 01/03/2021, 09/25/2018 HISTORY: ORDERING SYSTEM PROVIDED HISTORY: cough, in isolation TECHNOLOGIST PROVIDED HISTORY: Reason for exam:->cough, in isolation Reason for Exam: cough, in isolation Acuity: Unknown Type of Exam: Unknown FINDINGS: A single frontal view of the chest was performed. There is no acute skeletal abnormality. The heart size and mediastinal contours are stable, with stable mild right paratracheal opacity representing vascular shadows.   There are persistent bibasilar airspace opacities which have slightly progressed in comparison with yesterday's exam.  The mid and upper lung zones remain clear. There is no evidence of a pneumothorax. Interval progression of mild bibasilar pneumonia. Xr Chest Portable    Result Date: 1/3/2021  EXAMINATION: ONE XRAY VIEW OF THE CHEST 1/3/2021 3:07 pm COMPARISON: 09/25/2018 HISTORY: ORDERING SYSTEM PROVIDED HISTORY: SOB TECHNOLOGIST PROVIDED HISTORY: Reason for exam:->SOB Reason for Exam: Fatigue (Pt arrived via [address] squad from home for report of weakness/fatigue, vomiting, diarrhea, and poor PO intake x3 days. ) Acuity: Unknown Type of Exam: Unknown FINDINGS: The cardial pericardial silhouette is stable in size. There is increased interstitial opacity found throughout both lungs. Pulmonary vasculature is borderline congested. No pneumothorax is seen. No free air. No acute bony abnormality. Findings compatible with pulmonary interstitial edema. Consider both cardiogenic and noncardiogenic etiologies, including atypical infections. Ct Chest Abdomen Pelvis Wo Contrast    Result Date: 1/3/2021  EXAMINATION: CT OF THE CHEST, ABDOMEN, AND PELVIS WITHOUT CONTRAST 1/3/2021 4:12 pm TECHNIQUE: CT of the chest, abdomen and pelvis was performed without the administration of intravenous contrast. Multiplanar reformatted images are provided for review. Dose modulation, iterative reconstruction, and/or weight based adjustment of the mA/kV was utilized to reduce the radiation dose to as low as reasonably achievable.  COMPARISON: None HISTORY: ORDERING SYSTEM PROVIDED HISTORY: uti, r/o obstruction, poss pnuemonia TECHNOLOGIST PROVIDED HISTORY: Reason for exam:->uti, r/o obstruction, poss pnuemonia Additional Contrast?->None Reason for Exam: uti, r/o obstruction, poss pnuemonia Acuity: Acute Type of Exam: Initial FINDINGS: Chest: Mediastinum: Evaluation of mediastinal structures is limited without intravenous contrast.  That said, cardiac chambers are unremarkable. No pericardial effusion. Noncontrast imaging of the thoracic aorta unremarkable. Main pulmonary artery is prominent, raising the possibility of pulmonary hypertension. Otherwise, pulmonary arteries are unremarkable. Visualized thyroid unremarkable. No pathologically enlarged mediastinal or hilar lymph nodes. Esophagus is unremarkable. Lungs/pleura: There are patchy areas of ground-glass opacity noted within the lower lungs, greater on the right. Dependent atelectasis noted bilaterally. Chronic interstitial opacities are seen in the background. Emphysema noted. There is diffuse bronchial wall thickening. Narrowing of the mainstem bronchi is noted, which is likely secondary to expiratory imaging. Soft Tissues/Bones: No acute bony abnormalities are identified. Visualized extra thoracic soft tissues are unremarkable. Abdomen/Pelvis: Evaluation of the solid and hollow abdominal viscera is limited without intravenous contrast. Organs: That said, no acute hepatic abnormality identified. Gallstone is noted within the gallbladder fundus measuring up to 1.9 cm. No other CT evidence of cholecystitis is seen. Noncontrast imaging of the spleen, adrenals, and pancreas is unremarkable. No acute renal abnormalities are identified. No hydronephrosis or hydroureter. GI/Bowel: Moderate diverticulosis of the large bowel is present without CT evidence of diverticulitis. The large bowel is otherwise unremarkable in appearance. The appendix is not well visualized but no asymmetric pericecal inflammation is seen. Distal esophagus, stomach, duodenal sweep, and the remainder of the small bowel are unremarkable in appearance. No evidence of bowel obstruction. Pelvis: Urinary bladder unremarkable. No free pelvic fluid. Peritoneum/Retroperitoneum: No retroperitoneal lymphadenopathy identified. The abdominal aorta is at the upper limits of normal at 2.8 cm. Bones/Soft Tissues:  The extra-abdominal and extra pelvic soft tissues are unremarkable. No acute or suspicious bony abnormality. CHEST CT: Patchy areas of ground-glass opacity within the lower lungs, greater on the right, overall mild in degree. While nonspecific, the findings are concerning for multifocal pneumonia, especially viral pneumonia. Emphysema. Bronchial wall thickening, which may be seen in inflammatory conditions such as bronchitis, reactive airways disease, and smoking. ABDOMEN AND PELVIS CT: No acute abnormality identified. Uncomplicated cholelithiasis. Moderate diverticulosis of the large bowel, but without CT evidence of diverticulitis. EKG: from ER, interpreted by self, AFib at 118. No acute st elevation. No TWI noted. MEDICAL DECISION MAKING:    Principal Problem:    UTI (urinary tract infection) -New Problem to me. Pt with sign of uti on admit u/a  Plan: empiiric rocephin started. cx sent in er  Active Problems:    HTN (hypertension) -Established problem. Stable. 125/72  Plan: Pt home BP meds reviewed and will be continued. IV Hydralazine ordered for control of extremely high blood pressures   Will monitor labs to assess Creat/K for possible complications of medications. PRABHA (acute kidney injury) ([facility] -New Problem to me. Creat 1.4 on admit. Could be due to uti  Plan: admit, ivf, iv abx for uti. Repeat labs ordered    Suspected COVID-19 virus infection -Established problem. Stable. Family members also sick  Plan: place in droplet plus isolation. Await swab result. Iv decadron ordered. Acute resp fail- New Problem to me. Pt with 4-5L o2 requirement. Plan - iv decadron for now. Diagnoses as listed above, designated as new or established and plan outlined for each. Data Reviewed:   (1) Lab tests were reviewed or ordered. (1) Radiology tests were reviewed or ordered. (1) Medical test (Echo, EKG, PFT/harsha) were ordered.   (1)History was  obtained from someone other than patient  (1) Old records were reviewed - see HPI/MDM for pertinent details if review done. (2) Case wasdiscussed with another health care provider: Jose RIOS  (2) Imaging was viewed by myself. (2) EKG  was viewed by myself. The patient isbeing placed in inpatient status with the expectation of requiring a hospital stay spanning at least two midnights for care and treatment of the problems noted in the problem list.  This determination is also based on thepatients comorbidities and past medical history, the severity and timing of the signs and symptoms upon presentation.         Electronically signed by: Trey Mcqueen 1/4/2021

## 2021-01-04 NOTE — ED PROVIDER NOTES
[facility]        Pt Name: Constanza James  MRN: [id number]  [place] 8/6/1931  Date of evaluation: 1/3/2021  Provider: Fermin Qureshi PA-C  PCP: Toma Pan DO     I have seen and evaluated this patient with my supervising physician Ashley Roblero DO.    CHIEF COMPLAINT       Chief Complaint   Patient presents with    Fatigue     Pt arrived via [facility] from home for report of weakness/fatigue, vomiting, diarrhea, and poor PO intake x3 days. HISTORY OF PRESENT ILLNESS   (Location, Timing/Onset, Context/Setting, Quality, Duration, Modifying Factors, Severity, Associated Signs and Symptoms)  Note limiting factors. Constanza James is a 80 y.o. female presents to the emergency department today with her daughter for concerns of general fatigue. The patient does have a history of dementia, and she is otherwise acting at baseline. The patient apparently lives with her grandson and his wife. The daughter is at bedside and gives most of the history she states that over the past 3 days the patient has had general fatigue, decreasing appetite, cough, vomiting and diarrhea. She states that the patient had a fever of 100 today, she is afebrile when she arrives to the ED. Patient is started with vomiting today, there is been no reports of any bilious emesis, hematemesis reported emesis. The patient has had multiple episodes of diarrhea, there have been no blood in the stool black tarry appearance to stool. Her cough has been dry. There is been no sputum production or hemoptysis. The patient has not had any urinary symptoms. She is acting at baseline. Her grandson and his wife are sick as well, and are being tested for COVID-19. Nursing Notes were all reviewed and agreed with or any disagreements were addressed in the HPI.     REVIEW OF SYSTEMS    (2-9 systems for level 4, 10 or more for level 5)     Review of Systems Constitutional: Positive for fatigue. Negative for activity change, appetite change, chills and fever. HENT: Negative for congestion and rhinorrhea. Respiratory: Positive for cough. Negative for shortness of breath. Cardiovascular: Negative for chest pain. Gastrointestinal: Positive for diarrhea and vomiting. Negative for abdominal pain and nausea. Genitourinary: Negative for difficulty urinating, dysuria and hematuria. Positives and Pertinent negatives as per HPI. Except as noted above in the ROS, all other systems were reviewed and negative. PAST MEDICAL HISTORY     Past Medical History:   Diagnosis Date    [facility]est[facility] [address]     COPD (chronic obstructive pulmonary disease) [facility] [address]     Depression     Hypertension          SURGICAL HISTORY   History reviewed. No pertinent surgical history. CURRENTMEDICATIONS       Previous Medications    ALBUTEROL SULFATE HFA (PROVENTIL HFA) 108 (90 BASE) MCG/ACT INHALER    Inhale 2 puffs into the lungs every 6 hours as needed for Wheezing    BUDESONIDE-FORMOTEROL (SYMBICORT) 160-4.5 MCG/ACT AERO    Inhale 2 puffs into the lungs 2 times daily    DIGOXIN (LANOXIN) 125 MCG TABLET    Take 125 mcg by mouth daily    DONEPEZIL (ARICEPT) 5 MG TABLET    Take 5 mg by mouth nightly    FUROSEMIDE (LASIX) 40 MG TABLET    Take 40 mg by mouth daily     GABAPENTIN (NEURONTIN) 100 MG CAPSULE    Take 100 mg by mouth 3 times daily. LORATADINE (CLARITIN) 10 MG TABLET    Take 10 mg by mouth daily    POTASSIUM CHLORIDE (KLOR-CON) 20 MEQ PACKET    Take 20 mEq by mouth daily    SPIRONOLACTONE (ALDACTONE) 25 MG TABLET    Take 0.5 tablets by mouth daily    TRAZODONE (DESYREL) 50 MG TABLET    Take 50 mg by mouth nightly    VENLAFAXINE (EFFEXOR) 75 MG TABLET    Take by mouth daily 2 tabs po daily         ALLERGIES     Pcn [penicillins]    FAMILYHISTORY     History reviewed. No pertinent family history.        SOCIAL HISTORY       Social History Tobacco Use    Smoking status: Never Smoker    Smokeless tobacco: Never Used   Substance Use Topics    Alcohol use: No    Drug use: Not on file       SCREENINGS             PHYSICAL EXAM    (up to 7 for level 4, 8 or more for level 5)     ED Triage Vitals [01/03/21 1722]   BP Temp Temp Source Pulse Resp SpO2 Height Weight   (!) 118/59 97.2 °F (36.2 °C) Oral 122 18 93 % 4' 11\" (1.499 m) 225 lb (102.1 kg)       Physical Exam  Vitals signs and nursing note reviewed. Constitutional:       Appearance: She is well-developed. She is not diaphoretic. HENT:      Head: Normocephalic and atraumatic. Right Ear: External ear normal.      Left Ear: External ear normal.      Nose: Nose normal.   Eyes:      General:         Right eye: No discharge. Left eye: No discharge. Neck:      Musculoskeletal: Normal range of motion and neck supple. Trachea: No tracheal deviation. Cardiovascular:      Rate and Rhythm: Regular rhythm. Tachycardia present. Heart sounds: No murmur. Pulmonary:      Effort: Pulmonary effort is normal. No respiratory distress. Breath sounds: No wheezing. Comments: Diminished aeration  with scattered rhonchorous breath sounds. Abdominal:      General: Bowel sounds are normal. There is no distension. Palpations: Abdomen is soft. Tenderness: There is no abdominal tenderness. There is no guarding. Musculoskeletal: Normal range of motion. Skin:     General: Skin is warm and dry. Neurological:      General: No focal deficit present. Mental Status: She is alert. Mental status is at baseline.    Psychiatric:         Behavior: Behavior normal.         DIAGNOSTIC RESULTS   LABS:    Labs Reviewed   CBC WITH AUTO DIFFERENTIAL - Abnormal; Notable for the following components:       Result Value    RBC 5.33 (*)     RDW 15.7 (*)     Lymphocytes Absolute 0.8 (*)     All other components within normal limits    Narrative:     Performed at:  [facility]) - [address]   Phone [phone]   APTT    Narrative:     Performed at:  [facility] [address] [facility],  [place], [address]   Phone [phone]   PROTIME-INR    Narrative:     Performed at:  [facility] [address], [place],  [place], [address]   Phone [phone]   LACTATE, SEPSIS    Narrative:     Performed at:  [facility] [address],  [place], [address]   Phone [phone]   PROCALCITONIN    Narrative:     Performed at:  [facility] [address],  [place], [address]   Phone [phone]   CK    Narrative:     Performed at:  [facility] [address], [place],  [place], [address]   Phone [phone]       All other labs were within normal range or not returned as of this dictation. EKG: All EKG's are interpreted by the Emergency Department Physician in the absence of a cardiologist.  Please see their note for interpretation of EKG. RADIOLOGY:   Non-plain film images such as CT, Ultrasound and MRI are read by the radiologist. Plain radiographic images are visualized and preliminarily interpreted by the ED Provider with the below findings:        Interpretation per the Radiologist below, if available at the time of this note:    CT CHEST ABDOMEN PELVIS WO CONTRAST   Final Result   CHEST CT: Patchy areas of ground-glass opacity within the lower lungs,   greater on the right, overall mild in degree. While nonspecific, the   findings are concerning for multifocal pneumonia, especially viral pneumonia. Emphysema. Bronchial wall thickening, which may be seen in inflammatory conditions such   as bronchitis, reactive airways disease, and smoking. ABDOMEN AND PELVIS CT: No acute abnormality identified. Uncomplicated cholelithiasis.       Moderate diverticulosis of the large bowel, but without CT evidence of diverticulitis. XR CHEST PORTABLE   Final Result   Findings compatible with pulmonary interstitial edema. Consider both   cardiogenic and noncardiogenic etiologies, including atypical infections. Xr Chest Portable    Result Date: 1/3/2021  EXAMINATION: ONE XRAY VIEW OF THE CHEST 1/3/2021 3:07 pm COMPARISON: 09/25/2018 HISTORY: ORDERING SYSTEM PROVIDED HISTORY: SOB TECHNOLOGIST PROVIDED HISTORY: Reason for exam:->SOB Reason for Exam: Fatigue (Pt arrived via [address] squad from home for report of weakness/fatigue, vomiting, diarrhea, and poor PO intake x3 days. ) Acuity: Unknown Type of Exam: Unknown FINDINGS: The cardial pericardial silhouette is stable in size. There is increased interstitial opacity found throughout both lungs. Pulmonary vasculature is borderline congested. No pneumothorax is seen. No free air. No acute bony abnormality. Findings compatible with pulmonary interstitial edema. Consider both cardiogenic and noncardiogenic etiologies, including atypical infections.            PROCEDURES   Unless otherwise noted below, none     Procedures    CRITICAL CARE TIME   N/A    CONSULTS:  IP CONSULT TO PRIMARY CARE PROVIDER      EMERGENCY DEPARTMENT COURSE and DIFFERENTIAL DIAGNOSIS/MDM:   Vitals:    Vitals:    01/03/21 1830 01/03/21 1900 01/03/21 1930 01/03/21 2000   BP: (!) 129/51 113/62 136/67 117/63   Pulse: 123 122 119 114   Resp: 18 19 19 20   Temp:       TempSrc:       SpO2:  92% 93% 94%   Weight:       Height:           Patient was given the following medications:  Medications   cefTRIAXone (ROCEPHIN) 1 g in sterile water 10 mL IV syringe (has no administration in time range)   azithromycin (ZITHROMAX) 500 mg in D5W 250ml Vial Mate (has no administration in time range)   lactated ringers infusion 1,000 mL (1,000 mLs Intravenous New Bag 1/3/21 1835)           Briefly, this is an 51-year-old female, history of CHF, COPD who presents to the emergency department today for diarrhea,

## 2021-01-04 NOTE — ED NOTES
Patient arrived via squad tonight for report of fatigue, weakness, nausea, vomiting, and poor PO intake x3 days. Daughter at bedside, states patient does have a history of dementia. Oriented to self only, requires frequent redirection. Daughter reports that patient lives with her grandson and his girlfriend who takes care of her. Patient arrived covered in stool, incontinent. Red, angry skin below right breast with yeasty appearance. Multiple open areas to bottom. Val care provided. Patient with complaint of pain to bilateral lower extremities, trace edema noted, skin is dry and jessica. Patient straight cathed for urine specimen and then Purewick placed for patient's comfort.       Spurgeon Blizzard, RN  01/03/21 2035

## 2021-01-04 NOTE — ED PROVIDER NOTES
I independently performed a history and physical on Sonali Button. All diagnostic, treatment, and disposition decisions were made by myself in conjunction with the advanced practice provider. Briefly, this is a 80 y.o. female here for weakness and fatigue. Per family has been running fevers at home, other family members ill as well. Concerned for [address]. Patient with history of dementia. On exam, Patient afebrile, mildly ill appearing however nontoxic. No distress. Heart tachycardic, reg rhythm. Lungs with bibasilar crackles, no wheezes, no increased WOB or hypoxia. Abdomen soft, nondistended, nontender to palpation in all quadrants. EKG  EKG was reviewed by emergency department physician in the absence of a cardiologist    Narrow complex sinus rhythm, rate 118, normal axis, normal IA and QRS intervals, normal Qtc, no ST elevations or depressions, normal t-wave morphology, impression sinus tachycardia with PAC's, no STEMI, machine interpreted as atrial fibrillation however P waves visible      Screenings   [place] Coma Scale  Eye Opening: Spontaneous  Best Verbal Response: Confused  Best Motor Response: Obeys commands  [place] Coma Scale Score: 14        MDM    Patient afebrile here in Ed, nontoxic. No respiratory or other distress. EKG no STEMI, troponin normal, ACS or malignant dysrhythmia not evident. Imaging with multifocal pneumonia, clinical suspicion for COVID19 is high. Patient also with UTI, will cover for both UTI and CAP with ceftriaxone and azithromycin for now, although bacterial pneumonia felt less likely. Patient has remained mildly ill appearing and persistently tachycardic, she is at increased risk for decompensation. Case discussed with Dr. Dee Means and will admit for further evaluation and care. Patient Referrals:  No follow-up provider specified. Discharge Medications:  Current Discharge Medication List          FINAL IMPRESSION  1. Acute cystitis without hematuria    2. Nausea vomiting and diarrhea    3. Suspected COVID-19 virus infection    4. PRABHA (acute kidney injury) ([facility] [address]        Blood pressure 126/76, pulse 101, temperature 98.8 °F (37.1 °C), temperature source Oral, resp. rate 20, height 4' 11\" (1.499 m), weight 225 lb (102.1 kg), SpO2 96 %. For further details of [facility] emergency department encounter, please see documentation by advanced practice provider, GABRIEL Harrison.     Kin Rodrigues DO (electronically signed)  Attending Emergency Physician       Kin Rodrigues DO  01/04/21 1246

## 2021-01-05 PROBLEM — U07.1 COVID-19: Status: ACTIVE | Noted: 2021-01-05

## 2021-01-05 LAB
C-REACTIVE PROTEIN: 39.9 MG/L (ref 0–5.1)
D DIMER: 478 NG/ML DDU (ref 0–229)

## 2021-01-05 PROCEDURE — 97166 OT EVAL MOD COMPLEX 45 MIN: CPT

## 2021-01-05 PROCEDURE — 86140 C-REACTIVE PROTEIN: CPT

## 2021-01-05 PROCEDURE — 94761 N-INVAS EAR/PLS OXIMETRY MLT: CPT

## 2021-01-05 PROCEDURE — 36415 COLL VENOUS BLD VENIPUNCTURE: CPT

## 2021-01-05 PROCEDURE — 97162 PT EVAL MOD COMPLEX 30 MIN: CPT

## 2021-01-05 PROCEDURE — 6370000000 HC RX 637 (ALT 250 FOR IP): Performed by: INTERNAL MEDICINE

## 2021-01-05 PROCEDURE — 85379 FIBRIN DEGRADATION QUANT: CPT

## 2021-01-05 PROCEDURE — 2700000000 HC OXYGEN THERAPY PER DAY

## 2021-01-05 PROCEDURE — 97530 THERAPEUTIC ACTIVITIES: CPT

## 2021-01-05 PROCEDURE — 97535 SELF CARE MNGMENT TRAINING: CPT

## 2021-01-05 PROCEDURE — 6360000002 HC RX W HCPCS: Performed by: INTERNAL MEDICINE

## 2021-01-05 PROCEDURE — 2580000003 HC RX 258: Performed by: INTERNAL MEDICINE

## 2021-01-05 PROCEDURE — 1200000000 HC SEMI PRIVATE

## 2021-01-05 PROCEDURE — 94640 AIRWAY INHALATION TREATMENT: CPT

## 2021-01-05 PROCEDURE — 2500000003 HC RX 250 WO HCPCS: Performed by: INTERNAL MEDICINE

## 2021-01-05 PROCEDURE — 97116 GAIT TRAINING THERAPY: CPT

## 2021-01-05 RX ADMIN — CETIRIZINE HYDROCHLORIDE 10 MG: 10 TABLET, FILM COATED ORAL at 08:39

## 2021-01-05 RX ADMIN — DONEPEZIL HYDROCHLORIDE 5 MG: 5 TABLET, FILM COATED ORAL at 20:27

## 2021-01-05 RX ADMIN — Medication 2 PUFF: at 13:17

## 2021-01-05 RX ADMIN — ENOXAPARIN SODIUM 40 MG: 40 INJECTION SUBCUTANEOUS at 08:39

## 2021-01-05 RX ADMIN — POTASSIUM CHLORIDE 20 MEQ: 750 TABLET, FILM COATED, EXTENDED RELEASE ORAL at 08:39

## 2021-01-05 RX ADMIN — REMDESIVIR 100 MG: 100 INJECTION, POWDER, LYOPHILIZED, FOR SOLUTION INTRAVENOUS at 11:28

## 2021-01-05 RX ADMIN — Medication 1 G: at 20:26

## 2021-01-05 RX ADMIN — ENOXAPARIN SODIUM 40 MG: 40 INJECTION SUBCUTANEOUS at 20:26

## 2021-01-05 RX ADMIN — ACETAMINOPHEN 650 MG: 325 TABLET ORAL at 08:44

## 2021-01-05 RX ADMIN — GABAPENTIN 100 MG: 100 CAPSULE ORAL at 20:27

## 2021-01-05 RX ADMIN — DEXAMETHASONE SODIUM PHOSPHATE 6 MG: 10 INJECTION, SOLUTION INTRAMUSCULAR; INTRAVENOUS at 08:39

## 2021-01-05 RX ADMIN — Medication 2 PUFF: at 09:47

## 2021-01-05 RX ADMIN — GABAPENTIN 100 MG: 100 CAPSULE ORAL at 08:39

## 2021-01-05 RX ADMIN — GABAPENTIN 100 MG: 100 CAPSULE ORAL at 14:42

## 2021-01-05 RX ADMIN — Medication 2 PUFF: at 19:30

## 2021-01-05 RX ADMIN — TRAZODONE HYDROCHLORIDE 50 MG: 50 TABLET ORAL at 20:27

## 2021-01-05 RX ADMIN — VENLAFAXINE 75 MG: 37.5 TABLET ORAL at 08:39

## 2021-01-05 ASSESSMENT — PAIN SCALES - GENERAL
PAINLEVEL_OUTOF10: 0
PAINLEVEL_OUTOF10: 0

## 2021-01-05 NOTE — PROGRESS NOTES
2304: conv.  Plasma started, no suspected reaction, monitored for 15 min, VSS    0045: plasma completed, VSS    0145: VSS

## 2021-01-05 NOTE — PROGRESS NOTES
Physician Progress Note      PATIENT:               Josefa Herman  CSN #:                  [id number]  :                       1931  ADMIT DATE:       1/3/2021 5:14 PM  [address] [place] DATE:  RESPONDING  PROVIDER #:        Adrian Mckinnon MD          QUERY TEXT:    Dear Dr. Franko Menjivar, and associates,    Patient admitted with BMI 45.44. If possible, please document in progress   notes and discharge summary if you are evaluating and /or treating any of the   following: The medical record reflects the following:  Risk Factors: 80 y. o. female w/PMH of obesity, presents with Covid-19, acute   resp failure, UTI, PRABHA, HTN  Clinical Indicators: BMI 45.44  Treatment: safety precautions, fall risk precautions, I&Os, wts  Options provided:  -- Obesity  -- Morbid obesity  -- Overweight  -- BMI not clinically significant  -- Other - I will add my own diagnosis  -- Disagree - Not applicable / Not valid  -- Disagree - Clinically unable to determine / Unknown  -- Refer to Clinical Documentation Reviewer    PROVIDER RESPONSE TEXT:    This patient has morbid obesity.     Query created by: Alma Cummings on 2021 3:30 PM      Electronically signed by:  Adrian Mckinnon MD 2021 8:18 AM

## 2021-01-05 NOTE — PROGRESS NOTES
Occupational Therapy   Occupational Therapy Initial Assessment  Date: 2021   Patient Name: Christiano Corbett  MRN: [id number]     : 1931    Date of Service: 2021    Discharge Recommendations: Christiano Corbett scored a 14/24 on the AM-PAC ADL Inpatient form. Current research shows that an AM-PAC score of 17 or less is typically not associated with a discharge to the patient's home setting. Based on the patient's AM-PAC score and their current ADL deficits, it is recommended that the patient have 3-5 sessions per week of Occupational Therapy at d/c to increase the patient's independence. Please see assessment section for further patient specific details. If pt and pt's family refuse above recommendation, the following home care is recommended:    HOME HEALTH CARE: LEVEL 3 SAFETY     - Initial home health evaluation to occur within 24-48 hours, in patient home   - Therapy evaluations in home within 24-48 hours of discharge; including DME and home safety   - Frontload therapy 5 days, then 3x a week   - Therapy to evaluate if patient has [address] needs for personal care   -  evaluation within 24-48 hours, includes evaluation of resources and insurance to determine [place], [place], LTC, and Medicaid options       If patient discharges prior to next session this note will serve as a discharge summary. Please see below for the latest assessment towards goals. OT Equipment Recommendations  Equipment Needed: No    Assessment   Performance deficits / Impairments: Decreased functional mobility ; Decreased endurance;Decreased cognition;Decreased high-level IADLs;Decreased strength;Decreased balance;Decreased ADL status  Assessment: Pt is limited in the above areas impacting safety and independence in ADLs and functional mobility. Pt would benefit from skilled inpatient OT services to address these deficits.   Treatment Diagnosis: Decreased functional status secondary to UTI and COVID-19  Prognosis: Fair  Decision Making: Medium Complexity  OT Education: OT Role;Plan of Care;Transfer Training  Patient Education: d/c- pt verbalized understanding; would benefit from reinforcement due to cognitive deficits  Barriers to Learning: cognition, hx dementia  REQUIRES OT FOLLOW UP: Yes  Activity Tolerance  Activity Tolerance: Treatment limited secondary to medical complications (free text); Patient Tolerated treatment well  Activity Tolerance: Pt on 4 L O2 via nasal cannula throughout session with O2 sat at 96% upon entry. Following transfer, desat to 81% with wheezing and recovery in 90 seconds to low 90s. Safety Devices  Safety Devices in place: Yes  Type of devices: All fall risk precautions in place; Left in chair;Nurse notified;Call light within reach;Gait belt; Chair alarm in place  Restraints  Initially in place: No           Patient Diagnosis(es): The primary encounter diagnosis was Acute cystitis without hematuria. Diagnoses of Nausea vomiting and diarrhea, Suspected COVID-19 virus infection, and PRABHA (acute kidney injury) ([facility] were also pertinent to this visit. has a past medical history of Arthritis, CHF (congestive heart failure) ([facility] COPD (chronic obstructive pulmonary disease) ([facility] Dementia ([facility] Depression, and Hypertension. has a past surgical history that includes Hysterectomy. Treatment Diagnosis: Decreased functional status secondary to UTI and COVID-19      Restrictions  Restrictions/Precautions  Restrictions/Precautions: Fall Risk(high fall risk; general diet)  Required Braces or Orthoses?: No  Position Activity Restriction  Other position/activity restrictions: 80 y.o. female presents to the emergency department today with her daughter for concerns of general fatigue. The patient does have a history of dementia, and she is otherwise acting at baseline. The patient apparently lives with her grandson and his wife.   The daughter is at bedside and gives most of the history she states that over the past 3 days the patient has had general fatigue, decreasing appetite, cough, vomiting and diarrhea. She states that the patient had a fever of 100 today, she is afebrile when she arrives to the ED. Patient is started with vomiting today, there is been no reports of any bilious emesis, hematemesis reported emesis. The patient has had multiple episodes of diarrhea, there have been no blood in the stool black tarry appearance to stool. Her cough has been dry. There is been no sputum production or hemoptysis. The patient has not had any urinary symptoms. She is acting at baseline. Her grandson and his wife are sick as well, and are being tested for COVID-19.     Subjective   General  Chart Reviewed: Yes  Patient assessed for rehabilitation services?: Yes  Family / Caregiver Present: No  Diagnosis: UTI, COVID-19  Subjective  Subjective: Pt supine in bed upon arrival; hard of hearing and initially lethargic with increased arousal following conversation and mobility; agreeable to eval.  Patient Currently in Pain: Denies  Vital Signs  Patient Currently in Pain: Denies  Oxygen Therapy  SpO2: 95 %  Pulse Oximeter Device Mode: Continuous  Pulse Oximeter Device Location: Finger  O2 Device: Nasal cannula  O2 Flow Rate (L/min): 4 L/min  Social/Functional History  Social/Functional History  Lives With: Other (comment)(grandson and his wife)  Type of Home: House  Home Layout: One level  Home Access: Stairs to enter with rails  Entrance Stairs - Number of Steps: 1 ILIANA  Bathroom Shower/Tub: Walk-in shower, Shower chair with back  [employer]: Standard  Bathroom Equipment: Grab bars in shower, Grab bars around toilet  Bathroom Accessibility: Accessible  Home Equipment: Rolling walker, [employer] Help From: Family  ADL Assistance: Needs assistance(family assists with \"anything I need,\" including dressing, bathing, etc.)  Homemaking Assistance: Needs assistance  Homemaking Responsibilities: No  Ambulation Assistance: Needs assistance(uses RW, daughter reports pt needs w/c)  Transfer Assistance: Needs assistance  Active : No  Patient's  Info: family drives pt  Occupation: Retired  Type of occupation: previous hairdresser  Additional Comments: Pt reports no recent falls; hx of dementia with pt requiring cues from SW notes to recall living environment       Objective   Vision: Impaired  Vision Exceptions: Wears glasses for reading  Hearing: Within functional limits    Orientation  Overall Orientation Status: Impaired  Orientation Level: Oriented to person;Disoriented to person;Disoriented to place; Disoriented to time  Observation/Palpation  Posture: Fair  Observation: large body habitus, redness under skin folds and breasts bilat  Balance  Sitting Balance: Stand by assistance  Standing Balance: Minimal assistance  Standing Balance  Time: ~1 min  Activity: functional transfer, pericare  Comment: RW  Functional Mobility  Functional - Mobility Device: Rolling Walker  Activity: Other(~3 steps during transfer from EOB to recliner)  Assist Level: Minimal assistance  ADL  Feeding: Setup  UE Bathing: Moderate assistance;Verbal cueing(washed underarms with wipes seated EOB)  UE Dressing: Minimal assistance;Setup(doff/don gown)  LE Dressing: Dependent/Total(don socks, brief management)  Toileting: Dependent/Total(incontinent; purewick in place upon entry with brief and pad soiled)  Additional Comments: Pt limited in ADLs by deficits in cognition, strength, and endurance. Tone RUE  RUE Tone: Normotonic  Tone LUE  LUE Tone: Normotonic  Coordination  Movements Are Fluid And Coordinated: Yes     Bed mobility  Supine to Sit: Moderate assistance  Scooting:  Moderate assistance  Comment: poor trunk control  Transfers  Stand Step Transfers: Minimal assistance  Sit to stand: Minimal assistance  Stand to sit: Minimal assistance  Transfer Comments: EOB>recliner  Vision - Basic Assessment  Prior Vision: Wears glasses only for reading  Patient Visual Report: No visual complaint reported. Cognition  Overall Cognitive Status: Exceptions  Arousal/Alertness: Appropriate responses to stimuli(hard of hearing; requires very loud volume to respond)  Following Commands: Follows one step commands with repetition; Follows one step commands with increased time  Attention Span: Difficulty dividing attention  Memory: Decreased recall of recent events;Decreased short term memory  Problem Solving: Assistance required to generate solutions;Assistance required to correct errors made;Assistance required to implement solutions;Decreased awareness of errors;Assistance required to identify errors made  Insights: Decreased awareness of deficits  Initiation: Requires cues for some  Sequencing: Requires cues for some  Cognition Comment: hx dementia  Perception  Overall Perceptual Status: WFL     Sensation  Overall Sensation Status: WFL        LUE AROM (degrees)  LUE AROM : WFL  Left Hand AROM (degrees)  Left Hand AROM: WFL  RUE AROM (degrees)  RUE AROM : WFL  Right Hand AROM (degrees)  Right Hand AROM: WFL  LUE Strength  L Hand General: 3+/5  RUE Strength  R Hand General: 3+/5                   Plan   Plan  Times per week: 3-5  Times per day: Daily  Current Treatment Recommendations: Self-Care / ADL, Strengthening, Functional Mobility Training, Endurance Training, Patient/Caregiver Education & Training    G-Code     OutComes Score                                                  AM-PAC Score        AM-[facility] Inpatient Daily Activity Raw Score: 14 (01/05/21 1440)  AM-PAC Inpatient ADL T-Scale Score : 33.39 (01/05/21 1440)  ADL Inpatient CMS 0-100% Score: 59.67 (01/05/21 1440)  ADL Inpatient CMS G-Code Modifier : CK (01/05/21 1440)    Goals  Short term goals  Time Frame for Short term goals: d/c  Short term goal 1: Pt will complete bed mobility SBA. Short term goal 2: Pt will complete functional transfer SBA.   Short term goal 3: Pt will complete functional mobility CGA.  Short term goal 4: Pt will complete toileting min A. Short term goal 5: Pt will complete LB ADLs mod A.   Patient Goals   Patient goals : did not verbalize on this date       Therapy Time   Individual Concurrent Group Co-treatment   Time In 1342         Time Out 1422         Minutes 40         Timed Code Treatment Minutes: [address] [id number]

## 2021-01-05 NOTE — PROGRESS NOTES
Progress Note - Dr. Jennifer Richards - Internal Medicine  PCP: DO Santiago Alonzo [address] Day: 2  Code Status: Full Code  Current Diet: DIET GENERAL;        CC: follow up on medical issues    Subjective:   Julia Orozco is a 80 y.o. female. She denies problems    Remains on 4L o2  conv plasma ordered yest  On Iv decadron, remdesivir day 2    On iv rocpehin for uti  ngtd on ucx    She denies chest pain, denies shortness of breath, denies nausea,  denies emesis. 10 system Review of Systems is reviewed with patient, and pertinent positives are noted in HPI above . Otherwise, Review of systems is negative. I have reviewed the patient's medical and social history in detail and updated the computerized patient record. To recap: She  has a past medical history of Arthritis, CHF (congestive heart failure) ([facility] COPD (chronic obstructive [facility] disease) ([facility] Dementia ([facility] Depression, and Hypertension. . She  has a past surgical history that includes Hysterectomy. . She  reports that she has quit smoking. She has never used smokeless tobacco. She reports that she does not drink alcohol. .        Active Hospital Problems    Diagnosis Date Noted    Acute respiratory failure due to COVID-19 ([facility] [U07.1, J96.00] 01/04/2021    UTI (urinary tract infection) [N39.0] 01/03/2021    Suspected COVID-19 virus infection [Z20.822] 01/03/2021    HTN (hypertension) [I10] 09/23/2018    PRABHA (acute kidney injury) ([facility] [N17.9] 09/23/2018       Current Facility-Administered Medications: albuterol sulfate  (90 Base) MCG/ACT inhaler 2 puff, 2 puff, Inhalation, 4x daily  ipratropium (ATROVENT HFA) 17 MCG/ACT inhaler 2 puff, 2 puff, Inhalation, 4x daily  enoxaparin (LOVENOX) injection 40 mg, 40 mg, Subcutaneous, BID  [COMPLETED] remdesivir 200 mg in sodium chloride 0.9 % 250 mL IVPB, 200 mg, Intravenous, Once **FOLLOWED BY** remdesivir 100 mg in sodium chloride 0.9 % 250 mL IVPB, 100 mg, Intravenous, Q24H  0.9 % sodium chloride bolus, 30 mL, Intravenous, PRN  0.9 % sodium chloride infusion, , Intravenous, PRN  venlafaxine (EFFEXOR) tablet 75 mg, 75 mg, Oral, Daily  donepezil (ARICEPT) tablet 5 mg, 5 mg, Oral, Nightly  gabapentin (NEURONTIN) capsule 100 mg, 100 mg, Oral, TID  traZODone (DESYREL) tablet 50 mg, 50 mg, Oral, Nightly  potassium chloride (KLOR-CON) extended release tablet 20 mEq, 20 mEq, Oral, Daily  0.9 % sodium chloride infusion, , Intravenous, Continuous  sodium chloride flush 0.9 % injection 10 mL, 10 mL, Intravenous, 2 times per day  sodium chloride flush 0.9 % injection 10 mL, 10 mL, Intravenous, PRN  promethazine (PHENERGAN) tablet 12.5 mg, 12.5 mg, Oral, Q6H PRN **OR** ondansetron (ZOFRAN) injection 4 mg, 4 mg, Intravenous, Q6H PRN  magnesium hydroxide (MILK OF MAGNESIA) 400 MG/5ML suspension 30 mL, 30 mL, Oral, Daily PRN  acetaminophen (TYLENOL) tablet 650 mg, 650 mg, Oral, Q6H PRN **OR** acetaminophen (TYLENOL) suppository 650 mg, 650 mg, Rectal, Q6H PRN  [DISCONTINUED] azithromycin (ZITHROMAX) 500 mg in D5W 250ml Vial Mate, 500 mg, Intravenous, Q24H **AND** cefTRIAXone (ROCEPHIN) 1 g in sterile water 10 mL IV syringe, 1 g, Intravenous, Q24H  hydrALAZINE (APRESOLINE) injection 10 mg, 10 mg, Intravenous, Q6H PRN  0.9 % sodium chloride bolus, 500 mL, Intravenous, PRN  potassium chloride (KLOR-CON M) extended release tablet 40 mEq, 40 mEq, Oral, PRN **OR** potassium bicarb-citric acid (EFFER-K) effervescent tablet 40 mEq, 40 mEq, Oral, PRN **OR** potassium chloride 10 mEq/100 mL IVPB (Peripheral Line), 10 mEq, Intravenous, PRN  dexamethasone (PF) (DECADRON) injection 6 mg, 6 mg, Intravenous, Daily  cetirizine (ZYRTEC) tablet 10 mg, 10 mg, Oral, Daily  influenza quadrivalent split vaccine (FLUZONE;FLUARIX;FLULAVAL;AFLURIA) injection 0.5 mL, 0.5 mL, Intramuscular, Prior to discharge         Objective:  /64   Pulse 91   Temp 100.7 °F (38.2 °C) (Oral)   Resp 20 Ht 4' 11\" (1.499 m)   Wt 219 lb (99.3 kg)   SpO2 95%   BMI 44.23 kg/m²      Patient Vitals for the past 24 hrs:   BP Temp Temp src Pulse Resp SpO2 Weight   01/05/21 0837 133/64 100.7 °F (38.2 °C) Oral 91 20 95 % --   01/05/21 0456 129/66 98.2 °F (36.8 °C) Oral 94 19 95 % 219 lb (99.3 kg)   01/05/21 0150 136/77 98.7 °F (37.1 °C) Oral 82 18 98 % --   01/05/21 0045 (!) 120/57 99.3 °F (37.4 °C) Oral 84 18 97 % --   01/04/21 2326 (!) 117/57 99.2 °F (37.3 °C) Oral 87 18 100 % --   01/04/21 2304 114/68 99.6 °F (37.6 °C) Oral 91 18 97 % --   01/04/21 2213 138/73 99.6 °F (37.6 °C) Oral 98 18 97 % --   01/04/21 2207 -- -- -- -- -- 97 % --   01/04/21 2205 -- -- -- -- 18 97 % --   01/04/21 1611 -- -- -- -- 22 94 % --   01/04/21 1530 125/77 98.7 °F (37.1 °C) Oral 100 22 97 % --   01/04/21 1116 126/76 98.8 °F (37.1 °C) Oral 101 20 96 % --     Patient Vitals for the past 96 hrs (Last 3 readings):   Weight   01/05/21 0456 219 lb (99.3 kg)   01/03/21 1722 225 lb (102.1 kg)           Intake/Output Summary (Last 24 hours) at 1/5/2021 0858  Last data filed at 1/5/2021 0849  Gross per 24 hour   Intake 317 ml   Output 900 ml   Net -583 ml         Physical Exam:   /64   Pulse 91   Temp 100.7 °F (38.2 °C) (Oral)   Resp 20   Ht 4' 11\" (1.499 m)   Wt 219 lb (99.3 kg)   SpO2 95%   BMI 44.23 kg/m²   General appearance: alert, appears stated age and cooperative  Head: Normocephalic, without obvious abnormality, atraumatic  Lungs: clear to auscultation bilaterally  Heart: regular rate and rhythm, S1, S2 normal, no murmur, click, rub or gallop  Abdomen: soft, non-tender; bowel sounds normal; no masses,  no organomegaly  Extremities: extremities normal, atraumatic, no cyanosis or edema    Labs:  Lab Results   Component Value Date    WBC 6.6 01/04/2021    HGB 14.4 01/04/2021    HCT 45.1 01/04/2021     01/04/2021    CHOL 203 (H) 08/23/2018    TRIG 144 08/23/2018    HDL 63 (H) 08/23/2018    ALT 12 01/04/2021    AST 27 01/04/2021     (L) 01/04/2021    K 4.5 01/04/2021    CL 98 (L) 01/04/2021    CREATININE 1.2 01/04/2021    BUN 22 (H) 01/04/2021    CO2 26 01/04/2021    TSH 3.35 08/23/2018    INR 1.05 01/03/2021    LABMICR YES 01/03/2021     Lab Results   Component Value Date    CKTOTAL 97 01/03/2021    TROPONINI 0.01 01/03/2021       Recent Imaging Results are Reviewed:  Xr Chest Portable    Result Date: 1/4/2021  EXAMINATION: ONE XRAY VIEW OF THE CHEST 1/4/2021 6:53 am COMPARISON: 01/03/2021, 09/25/2018 HISTORY: ORDERING SYSTEM PROVIDED HISTORY: cough, in isolation TECHNOLOGIST PROVIDED HISTORY: Reason for exam:->cough, in isolation Reason for Exam: cough, in isolation Acuity: Unknown Type of Exam: Unknown FINDINGS: A single frontal view of the chest was performed. There is no acute skeletal abnormality. The heart size and mediastinal contours are stable, with stable mild right paratracheal opacity representing vascular shadows. There are persistent bibasilar airspace opacities which have slightly progressed in comparison with yesterday's exam.  The mid and upper lung zones remain clear. There is no evidence of a pneumothorax. Interval progression of mild bibasilar pneumonia. Xr Chest Portable    Result Date: 1/3/2021  EXAMINATION: ONE XRAY VIEW OF THE CHEST 1/3/2021 3:07 pm COMPARISON: 09/25/2018 HISTORY: ORDERING SYSTEM PROVIDED HISTORY: SOB TECHNOLOGIST PROVIDED HISTORY: Reason for exam:->SOB Reason for Exam: Fatigue (Pt arrived via [address] squad from home for report of weakness/fatigue, vomiting, diarrhea, and poor PO intake x3 days. ) Acuity: Unknown Type of Exam: Unknown FINDINGS: The cardial pericardial silhouette is stable in size. There is increased interstitial opacity found throughout both lungs. Pulmonary vasculature is borderline congested. No pneumothorax is seen. No free air. No acute bony abnormality. Findings compatible with pulmonary interstitial edema.   Consider both cardiogenic and noncardiogenic etiologies, including atypical infections. Ct Chest Abdomen Pelvis Wo Contrast    Result Date: 1/3/2021  EXAMINATION: CT OF THE CHEST, ABDOMEN, AND PELVIS WITHOUT CONTRAST 1/3/2021 4:12 pm TECHNIQUE: CT of the chest, abdomen and pelvis was performed without the administration of intravenous contrast. Multiplanar reformatted images are provided for review. Dose modulation, iterative reconstruction, and/or weight based adjustment of the mA/kV was utilized to reduce the radiation dose to as low as reasonably achievable. COMPARISON: None HISTORY: ORDERING SYSTEM PROVIDED HISTORY: uti, r/o obstruction, poss pnuemonia TECHNOLOGIST PROVIDED HISTORY: Reason for exam:->uti, r/o obstruction, poss pnuemonia Additional Contrast?->None Reason for Exam: uti, r/o obstruction, poss pnuemonia Acuity: Acute Type of Exam: Initial FINDINGS: Chest: Mediastinum: Evaluation of mediastinal structures is limited without intravenous contrast.  That said, cardiac chambers are unremarkable. No pericardial effusion. Noncontrast imaging of the thoracic aorta unremarkable. Main pulmonary artery is prominent, raising the possibility of pulmonary hypertension. Otherwise, pulmonary arteries are unremarkable. Visualized thyroid unremarkable. No pathologically enlarged mediastinal or hilar lymph nodes. Esophagus is unremarkable. Lungs/pleura: There are patchy areas of ground-glass opacity noted within the lower lungs, greater on the right. Dependent atelectasis noted bilaterally. Chronic interstitial opacities are seen in the background. Emphysema noted. There is diffuse bronchial wall thickening. Narrowing of the mainstem bronchi is noted, which is likely secondary to expiratory imaging. Soft Tissues/Bones: No acute bony abnormalities are identified. Visualized extra thoracic soft tissues are unremarkable.  Abdomen/Pelvis: Evaluation of the solid and hollow abdominal viscera is limited without intravenous contrast. Organs: That said, no acute hepatic abnormality identified. Gallstone is noted within the gallbladder fundus measuring up to 1.9 cm. No other CT evidence of cholecystitis is seen. Noncontrast imaging of the spleen, adrenals, and pancreas is unremarkable. No acute renal abnormalities are identified. No hydronephrosis or hydroureter. GI/Bowel: Moderate diverticulosis of the large bowel is present without CT evidence of diverticulitis. The large bowel is otherwise unremarkable in appearance. The appendix is not well visualized but no asymmetric pericecal inflammation is seen. Distal esophagus, stomach, duodenal sweep, and the remainder of the small bowel are unremarkable in appearance. No evidence of bowel obstruction. Pelvis: Urinary bladder unremarkable. No free pelvic fluid. Peritoneum/Retroperitoneum: No retroperitoneal lymphadenopathy identified. The abdominal aorta is at the upper limits of normal at 2.8 cm. Bones/Soft Tissues: The extra-abdominal and extra pelvic soft tissues are unremarkable. No acute or suspicious bony abnormality. CHEST CT: Patchy areas of ground-glass opacity within the lower lungs, greater on the right, overall mild in degree. While nonspecific, the findings are concerning for multifocal pneumonia, especially viral pneumonia. Emphysema. Bronchial wall thickening, which may be seen in inflammatory conditions such as bronchitis, reactive airways disease, and smoking. ABDOMEN AND PELVIS CT: No acute abnormality identified. Uncomplicated cholelithiasis. Moderate diverticulosis of the large bowel, but without CT evidence of diverticulitis. Assessment and Plan:  Principal Problem:    UTI (urinary tract infection) -Established problem. Stable. Plan: cont empiric rocephin for now. Await cx reuslts  Active Problems:    HTN (hypertension) -Established problem. Stable. 133/64  Plan: stay on same meds    PRABHA (acute kidney injury) ([facility] -Established problem. Stable.   Creat 1.2  Plan: cont to tx uti. Cont fluids    COVID-19 virus infection  Plan: on iv remdesivir day 2, conv plasma ordered 1/4.  Cont iv decadron    Acute respiratory failure due to COVID-19 [facility])  Plan: cont tx as above            Amalia Madrid  1/5/2021

## 2021-01-05 NOTE — PROGRESS NOTES
Physical Therapy    Facility/Department: [address] ONCOLOGY  Initial Assessment    NAME: Anca Sánchez  : 1931  MRN: [id number]    Date of Service: 2021    Discharge Recommendations:Karly Goss scored a 13/24 on the AM-PAC short mobility form. Current research shows that an AM-PAC score of 17 or less is typically not associated with a discharge to the patient's home setting. Based on the patient's AM-PAC score and their current functional mobility deficits, it is recommended that the patient have 3-5 sessions per week of Physical Therapy at d/c to increase the patient's independence. Please see assessment section for further patient specific details. If patient discharges prior to next session this note will serve as a discharge summary. Please see below for the latest assessment towards goals. PT Equipment Recommendations  Equipment Needed: No(No needs for DC but family interested in [employer] and lift chair for home)    Assessment   Body structures, Functions, Activity limitations: Decreased functional mobility ; Decreased ADL status; Decreased strength;Decreased cognition;Decreased endurance;Decreased balance;Decreased posture  Assessment: Pt with decreased mobility, endurance, strength, balance. Pt needing skilled PT services to address these issues. Treatment Diagnosis: weakness, difficulty with gait. Prognosis: Good  Decision Making: Medium Complexity  PT Education: PT Role;Plan of Care;Energy Conservation;Orientation; Functional Mobility Training  Patient Education: Pt verbalized understanding but needs reinforcement. Barriers to Learning: cognition  REQUIRES PT FOLLOW UP: Yes  Activity Tolerance  Activity Tolerance: Patient limited by endurance; Patient limited by fatigue  Activity Tolerance: O2 sats drop with activity. Patient Diagnosis(es): The primary encounter diagnosis was Acute cystitis without hematuria.  Diagnoses of Nausea vomiting and diarrhea, Suspected COVID-19 virus infection, and PRABHA (acute kidney injury) ([facility] were also pertinent to this visit. has a past medical history of Arthritis, CHF (congestive heart failure) ([address] COPD (chronic obstructive pulmonary disease) ([address] Dementia ([facility] Depression, and Hypertension. has a past surgical history that includes Hysterectomy. Restrictions  Restrictions/Precautions  Restrictions/Precautions: Fall Risk(high fall risk; general diet)  Required Braces or Orthoses?: No  Position Activity Restriction  Other position/activity restrictions: 80 y.o. female presents to the emergency department today with her daughter for concerns of general fatigue. The patient does have a history of dementia, and she is otherwise acting at baseline. The patient apparently lives with her grandson and his wife. The daughter is at bedside and gives most of the history she states that over the past 3 days the patient has had general fatigue, decreasing appetite, cough, vomiting and diarrhea. She states that the patient had a fever of 100 today, she is afebrile when she arrives to the ED. Patient is started with vomiting today, there is been no reports of any bilious emesis, hematemesis reported emesis. The patient has had multiple episodes of diarrhea, there have been no blood in the stool black tarry appearance to stool. Her cough has been dry. There is been no sputum production or hemoptysis. The patient has not had any urinary symptoms. She is acting at baseline. Her grandson and his wife are sick as well, and are being tested for COVID-19. Vision/Hearing        Subjective  General  Chart Reviewed: Yes  Patient assessed for rehabilitation services?: Yes  Response To Previous Treatment: Patient with no complaints from previous session.   Family / Caregiver Present: Yes(cotx for eval)  Diagnosis: UTI  Follows Commands: Within Functional Limits  General Comment  Comments: Pt supine in bed upon arrival. Brief and pad soaked of urine.  Subjective  Subjective: Pt denies pain at rest.  Pain Screening  Patient Currently in Pain: Denies  Vital Signs  Patient Currently in Pain: Denies       Orientation  Orientation  Overall Orientation Status: Impaired  Orientation Level: Oriented to person;Disoriented to situation;Disoriented to time;Disoriented to place  Social/Functional History  Social/Functional History  Lives With: Other (comment)(grandson and his wife)  Type of Home: House  Home Layout: One level  Home Access: Stairs to enter with rails  Entrance Stairs - Number of Steps: 1 ILIANA  Bathroom Shower/Tub: Walk-in shower, Shower chair with back  [employer]: Standard  Bathroom Equipment: Grab bars in shower, Grab bars around toilet  Bathroom Accessibility: Accessible  Home Equipment: Rolling walker, [employer] Help From: Family  ADL Assistance: Needs assistance(family assists with \"anything I need,\" including dressing, bathing, etc.)  Homemaking Assistance: Needs assistance  Homemaking Responsibilities: No  Ambulation Assistance: Needs assistance(uses RW, daughter reports pt needs w/c)  Transfer Assistance: Needs assistance  Active : No  Patient's  Info: family drives pt  Occupation: Retired  Type of occupation: previous hairdresser  Additional Comments: Pt reports no recent falls; hx of dementia with pt requiring cues from SW notes to recall living environment. Family gives assist for bathing, dressing, and all needs. Pt needing assist for transfers and walking which family reports has become increasingly difficult even before covid infection.    Cognition        Objective     Observation/Palpation  Posture: Fair  Observation: large body habitus, redness under skin folds and breasts bilat    AROM RLE (degrees)  RLE AROM: WFL  AROM LLE (degrees)  LLE AROM : WFL  Strength RLE  Comment: grossly +3/5  Strength LLE  Comment: grossly +3/5     Sensation  Overall Sensation Status: WFL  Bed mobility  Supine to Sit: Moderate goals: to be met by DC  Short term goal 1: Pt to perform bed mob with CGA. Short term goal 2: Pt to perform transfers with CGA. Short term goal 3: Pt to perform amb with AAD and SBA for 30 ft. Long term goals  Time Frame for Long term goals : LTGs=STGs  Patient Goals   Patient goals :  To return home       Therapy Time   Individual Concurrent Group Co-treatment   Time In 1342         Time Out 1422         Minutes 40         Timed Code Treatment Minutes: Rayne Bean 307, [id number]

## 2021-01-06 LAB
ANION GAP SERPL CALCULATED.3IONS-SCNC: 7 MMOL/L (ref 3–16)
BASOPHILS ABSOLUTE: 0 K/UL (ref 0–0.2)
BASOPHILS RELATIVE PERCENT: 0.1 %
BUN BLDV-MCNC: 26 MG/DL (ref 7–20)
CALCIUM SERPL-MCNC: 7.8 MG/DL (ref 8.3–10.6)
CHLORIDE BLD-SCNC: 107 MMOL/L (ref 99–110)
CO2: 24 MMOL/L (ref 21–32)
CREAT SERPL-MCNC: 0.7 MG/DL (ref 0.6–1.2)
EOSINOPHILS ABSOLUTE: 0 K/UL (ref 0–0.6)
EOSINOPHILS RELATIVE PERCENT: 0 %
GFR AFRICAN AMERICAN: >60
GFR NON-AFRICAN AMERICAN: >60
GLUCOSE BLD-MCNC: 136 MG/DL (ref 70–99)
HCT VFR BLD CALC: 40.9 % (ref 36–48)
HEMOGLOBIN: 12.8 G/DL (ref 12–16)
LYMPHOCYTES ABSOLUTE: 0.8 K/UL (ref 1–5.1)
LYMPHOCYTES RELATIVE PERCENT: 19.6 %
MCH RBC QN AUTO: 27.7 PG (ref 26–34)
MCHC RBC AUTO-ENTMCNC: 31.3 G/DL (ref 31–36)
MCV RBC AUTO: 88.4 FL (ref 80–100)
MONOCYTES ABSOLUTE: 0.6 K/UL (ref 0–1.3)
MONOCYTES RELATIVE PERCENT: 14 %
NEUTROPHILS ABSOLUTE: 2.7 K/UL (ref 1.7–7.7)
NEUTROPHILS RELATIVE PERCENT: 66.3 %
ORGANISM: ABNORMAL
ORGANISM: ABNORMAL
PDW BLD-RTO: 16.1 % (ref 12.4–15.4)
PLATELET # BLD: 217 K/UL (ref 135–450)
PMV BLD AUTO: 9.7 FL (ref 5–10.5)
POTASSIUM SERPL-SCNC: 3.9 MMOL/L (ref 3.5–5.1)
RBC # BLD: 4.62 M/UL (ref 4–5.2)
SODIUM BLD-SCNC: 138 MMOL/L (ref 136–145)
URINE CULTURE, ROUTINE: ABNORMAL
URINE CULTURE, ROUTINE: ABNORMAL
WBC # BLD: 4.1 K/UL (ref 4–11)

## 2021-01-06 PROCEDURE — 2500000003 HC RX 250 WO HCPCS: Performed by: INTERNAL MEDICINE

## 2021-01-06 PROCEDURE — 6360000002 HC RX W HCPCS: Performed by: INTERNAL MEDICINE

## 2021-01-06 PROCEDURE — 94640 AIRWAY INHALATION TREATMENT: CPT

## 2021-01-06 PROCEDURE — 2580000003 HC RX 258: Performed by: INTERNAL MEDICINE

## 2021-01-06 PROCEDURE — 6370000000 HC RX 637 (ALT 250 FOR IP): Performed by: INTERNAL MEDICINE

## 2021-01-06 PROCEDURE — 85025 COMPLETE CBC W/AUTO DIFF WBC: CPT

## 2021-01-06 PROCEDURE — 80048 BASIC METABOLIC PNL TOTAL CA: CPT

## 2021-01-06 PROCEDURE — 1200000000 HC SEMI PRIVATE

## 2021-01-06 RX ADMIN — Medication 10 ML: at 10:03

## 2021-01-06 RX ADMIN — Medication 2 PUFF: at 09:12

## 2021-01-06 RX ADMIN — ENOXAPARIN SODIUM 40 MG: 40 INJECTION SUBCUTANEOUS at 10:02

## 2021-01-06 RX ADMIN — POTASSIUM CHLORIDE 20 MEQ: 750 TABLET, FILM COATED, EXTENDED RELEASE ORAL at 10:01

## 2021-01-06 RX ADMIN — SODIUM CHLORIDE: 9 INJECTION, SOLUTION INTRAVENOUS at 19:57

## 2021-01-06 RX ADMIN — VENLAFAXINE 75 MG: 37.5 TABLET ORAL at 10:01

## 2021-01-06 RX ADMIN — GABAPENTIN 100 MG: 100 CAPSULE ORAL at 15:09

## 2021-01-06 RX ADMIN — GABAPENTIN 100 MG: 100 CAPSULE ORAL at 19:58

## 2021-01-06 RX ADMIN — GABAPENTIN 100 MG: 100 CAPSULE ORAL at 10:01

## 2021-01-06 RX ADMIN — Medication 1 G: at 19:58

## 2021-01-06 RX ADMIN — Medication 2 PUFF: at 16:43

## 2021-01-06 RX ADMIN — Medication 2 PUFF: at 20:34

## 2021-01-06 RX ADMIN — ENOXAPARIN SODIUM 40 MG: 40 INJECTION SUBCUTANEOUS at 19:58

## 2021-01-06 RX ADMIN — SODIUM CHLORIDE: 9 INJECTION, SOLUTION INTRAVENOUS at 04:05

## 2021-01-06 RX ADMIN — DONEPEZIL HYDROCHLORIDE 5 MG: 5 TABLET, FILM COATED ORAL at 19:58

## 2021-01-06 RX ADMIN — REMDESIVIR 100 MG: 100 INJECTION, POWDER, LYOPHILIZED, FOR SOLUTION INTRAVENOUS at 13:39

## 2021-01-06 RX ADMIN — CETIRIZINE HYDROCHLORIDE 10 MG: 10 TABLET, FILM COATED ORAL at 10:01

## 2021-01-06 RX ADMIN — TRAZODONE HYDROCHLORIDE 50 MG: 50 TABLET ORAL at 19:57

## 2021-01-06 RX ADMIN — DEXAMETHASONE SODIUM PHOSPHATE 6 MG: 10 INJECTION, SOLUTION INTRAMUSCULAR; INTRAVENOUS at 10:01

## 2021-01-06 NOTE — PROGRESS NOTES
Pt resting in bed, calm and cooperative, falls back to sleep easily. A/O to person place, time. Head to toe completed, meds administered. Fluids infusing. Call light within reach, fall precautions in place, bed alarmed, will continue to monitor.

## 2021-01-06 NOTE — CARE COORDINATION
SW spoke with patient's daughter regarding patient's discharge plan. PT/OT recommending that patient discharge to a SNF when patient is medically stable. Patient's daughter was given SNF list (verbal) of [facility] facilities. Patient's daughter stating that she wants to discuss with family before a final decision is made. Daughter stating that she might want patient to discharge to home with [address]. SW/CM will continue to follow progress and update patient's discharge plan as needed.     Electronically signed by CHEY Wallace on 1/6/2021 at 9:11 AM

## 2021-01-06 NOTE — PROGRESS NOTES
Progress Note - Dr. Melvin Monroy - Internal Medicine  PCP: DO Santiago Zhao [address] [facility] [address] [address] [facility] Day: 3  Code Status: Full Code  Current Diet: DIET GENERAL;        CC: follow up on medical issues    Subjective:   Merlin Garter is a 80 y.o. female. She denies problems or issues    still on 4L o2  conv plasma ordered 1/4  On Iv decadron, remdesivir day 3    On rocephin IV for uti  E coli on ucx  Awaiting sensitivities    She denies chest pain, denies shortness of breath, denies nausea,  denies emesis. 10 system Review of Systems is reviewed with patient, and pertinent positives are noted in HPI above . Otherwise, Review of systems is negative. I have reviewed the patient's medical and social history in detail and updated the computerized patient record. To recap: She  has a past medical history of Arthritis, CHF (congestive heart failure) ([facility] [address] COPD (chronic obstructive pulmonary disease) ([facility] [address] Dementia ([facility] Depression, and Hypertension. . She  has a past surgical history that includes Hysterectomy. . She  reports that she has quit smoking. She has never used smokeless tobacco. She reports that she does not drink alcohol. .        Active Hospital Problems    Diagnosis Date Noted    COVID-19 [U07.1] 01/05/2021    Acute respiratory failure due to COVID-19 ([facility] [U07.1, J96.00] 01/04/2021    UTI (urinary tract infection) [N39.0] 01/03/2021    HTN (hypertension) [I10] 09/23/2018    PRABHA (acute kidney injury) ([facility] [N17.9] 09/23/2018       Current Facility-Administered Medications: albuterol sulfate  (90 Base) MCG/ACT inhaler 2 puff, 2 puff, Inhalation, 4x daily  ipratropium (ATROVENT HFA) 17 MCG/ACT inhaler 2 puff, 2 puff, Inhalation, 4x daily  enoxaparin (LOVENOX) injection 40 mg, 40 mg, Subcutaneous, BID  [COMPLETED] remdesivir 200 mg in sodium chloride 0.9 % 250 mL IVPB, 200 mg, Intravenous, Once **FOLLOWED BY** remdesivir 100 mg in sodium chloride 0.9 % 250 mL IVPB, 100 mg, Intravenous, Q24H  0.9 % sodium chloride bolus, 30 mL, Intravenous, PRN  0.9 % sodium chloride infusion, , Intravenous, PRN  venlafaxine (EFFEXOR) tablet 75 mg, 75 mg, Oral, Daily  donepezil (ARICEPT) tablet 5 mg, 5 mg, Oral, Nightly  gabapentin (NEURONTIN) capsule 100 mg, 100 mg, Oral, TID  traZODone (DESYREL) tablet 50 mg, 50 mg, Oral, Nightly  potassium chloride (KLOR-CON) extended release tablet 20 mEq, 20 mEq, Oral, Daily  0.9 % sodium chloride infusion, , Intravenous, Continuous  sodium chloride flush 0.9 % injection 10 mL, 10 mL, Intravenous, 2 times per day  sodium chloride flush 0.9 % injection 10 mL, 10 mL, Intravenous, PRN  promethazine (PHENERGAN) tablet 12.5 mg, 12.5 mg, Oral, Q6H PRN **OR** ondansetron (ZOFRAN) injection 4 mg, 4 mg, Intravenous, Q6H PRN  magnesium hydroxide (MILK OF MAGNESIA) 400 MG/5ML suspension 30 mL, 30 mL, Oral, Daily PRN  acetaminophen (TYLENOL) tablet 650 mg, 650 mg, Oral, Q6H PRN **OR** acetaminophen (TYLENOL) suppository 650 mg, 650 mg, Rectal, Q6H PRN  [DISCONTINUED] azithromycin (ZITHROMAX) 500 mg in D5W 250ml Vial Mate, 500 mg, Intravenous, Q24H **AND** cefTRIAXone (ROCEPHIN) 1 g in sterile water 10 mL IV syringe, 1 g, Intravenous, Q24H  hydrALAZINE (APRESOLINE) injection 10 mg, 10 mg, Intravenous, Q6H PRN  0.9 % sodium chloride bolus, 500 mL, Intravenous, PRN  potassium chloride (KLOR-CON M) extended release tablet 40 mEq, 40 mEq, Oral, PRN **OR** potassium bicarb-citric acid (EFFER-K) effervescent tablet 40 mEq, 40 mEq, Oral, PRN **OR** potassium chloride 10 mEq/100 mL IVPB (Peripheral Line), 10 mEq, Intravenous, PRN  dexamethasone (PF) (DECADRON) injection 6 mg, 6 mg, Intravenous, Daily  cetirizine (ZYRTEC) tablet 10 mg, 10 mg, Oral, Daily  influenza quadrivalent split vaccine (FLUZONE;FLUARIX;FLULAVAL;AFLURIA) injection 0.5 mL, 0.5 mL, Intramuscular, Prior to discharge         Objective:  /60   Pulse 61   Temp 97.2 °F (36.2 °C) (Axillary) 3.35 08/23/2018    INR 1.05 01/03/2021    LABMICR YES 01/03/2021     Lab Results   Component Value Date    CKTOTAL 97 01/03/2021    TROPONINI 0.01 01/03/2021       Recent Imaging Results are Reviewed:  Xr Chest Portable    Result Date: 1/4/2021  EXAMINATION: ONE XRAY VIEW OF THE CHEST 1/4/2021 6:53 am COMPARISON: 01/03/2021, 09/25/2018 HISTORY: ORDERING SYSTEM PROVIDED HISTORY: cough, in isolation TECHNOLOGIST PROVIDED HISTORY: Reason for exam:->cough, in isolation Reason for Exam: cough, in isolation Acuity: Unknown Type of Exam: Unknown FINDINGS: A single frontal view of the chest was performed. There is no acute skeletal abnormality. The heart size and mediastinal contours are stable, with stable mild right paratracheal opacity representing vascular shadows. There are persistent bibasilar airspace opacities which have slightly progressed in comparison with yesterday's exam.  The mid and upper lung zones remain clear. There is no evidence of a pneumothorax. Interval progression of mild bibasilar pneumonia. Xr Chest Portable    Result Date: 1/3/2021  EXAMINATION: ONE XRAY VIEW OF THE CHEST 1/3/2021 3:07 pm COMPARISON: 09/25/2018 HISTORY: ORDERING SYSTEM PROVIDED HISTORY: SOB TECHNOLOGIST PROVIDED HISTORY: Reason for exam:->SOB Reason for Exam: Fatigue (Pt arrived via [facility] squad from home for report of weakness/fatigue, vomiting, diarrhea, and poor PO intake x3 days. ) Acuity: Unknown Type of Exam: Unknown FINDINGS: The cardial pericardial silhouette is stable in size. There is increased interstitial opacity found throughout both lungs. Pulmonary vasculature is borderline congested. No pneumothorax is seen. No free air. No acute bony abnormality. Findings compatible with pulmonary interstitial edema. Consider both cardiogenic and noncardiogenic etiologies, including atypical infections.      Ct Chest Abdomen Pelvis Wo Contrast    Result Date: 1/3/2021  EXAMINATION: CT OF THE CHEST, ABDOMEN, AND PELVIS WITHOUT CONTRAST 1/3/2021 4:12 pm TECHNIQUE: CT of the chest, abdomen and pelvis was performed without the administration of intravenous contrast. Multiplanar reformatted images are provided for review. Dose modulation, iterative reconstruction, and/or weight based adjustment of the mA/kV was utilized to reduce the radiation dose to as low as reasonably achievable. COMPARISON: None HISTORY: ORDERING SYSTEM PROVIDED HISTORY: uti, r/o obstruction, poss pnuemonia TECHNOLOGIST PROVIDED HISTORY: Reason for exam:->uti, r/o obstruction, poss pnuemonia Additional Contrast?->None Reason for Exam: uti, r/o obstruction, poss pnuemonia Acuity: Acute Type of Exam: Initial FINDINGS: Chest: Mediastinum: Evaluation of mediastinal structures is limited without intravenous contrast.  That said, cardiac chambers are unremarkable. No pericardial effusion. Noncontrast imaging of the thoracic aorta unremarkable. Main pulmonary artery is prominent, raising the possibility of pulmonary hypertension. Otherwise, pulmonary arteries are unremarkable. Visualized thyroid unremarkable. No pathologically enlarged mediastinal or hilar lymph nodes. Esophagus is unremarkable. Lungs/pleura: There are patchy areas of ground-glass opacity noted within the lower lungs, greater on the right. Dependent atelectasis noted bilaterally. Chronic interstitial opacities are seen in the background. Emphysema noted. There is diffuse bronchial wall thickening. Narrowing of the mainstem bronchi is noted, which is likely secondary to expiratory imaging. Soft Tissues/Bones: No acute bony abnormalities are identified. Visualized extra thoracic soft tissues are unremarkable. Abdomen/Pelvis: Evaluation of the solid and hollow abdominal viscera is limited without intravenous contrast. Organs: That said, no acute hepatic abnormality identified. Gallstone is noted within the gallbladder fundus measuring up to 1.9 cm.   No other CT evidence of cholecystitis is seen. Noncontrast imaging of the spleen, adrenals, and pancreas is unremarkable. No acute renal abnormalities are identified. No hydronephrosis or hydroureter. GI/Bowel: Moderate diverticulosis of the large bowel is present without CT evidence of diverticulitis. The large bowel is otherwise unremarkable in appearance. The appendix is not well visualized but no asymmetric pericecal inflammation is seen. Distal esophagus, stomach, duodenal sweep, and the remainder of the small bowel are unremarkable in appearance. No evidence of bowel obstruction. Pelvis: Urinary bladder unremarkable. No free pelvic fluid. Peritoneum/Retroperitoneum: No retroperitoneal lymphadenopathy identified. The abdominal aorta is at the upper limits of normal at 2.8 cm. Bones/Soft Tissues: The extra-abdominal and extra pelvic soft tissues are unremarkable. No acute or suspicious bony abnormality. CHEST CT: Patchy areas of ground-glass opacity within the lower lungs, greater on the right, overall mild in degree. While nonspecific, the findings are concerning for multifocal pneumonia, especially viral pneumonia. Emphysema. Bronchial wall thickening, which may be seen in inflammatory conditions such as bronchitis, reactive airways disease, and smoking. ABDOMEN AND PELVIS CT: No acute abnormality identified. Uncomplicated cholelithiasis. Moderate diverticulosis of the large bowel, but without CT evidence of diverticulitis. Assessment and Plan:  Principal Problem:    UTI (urinary tract infection) -Established problem. Stable. Plan: cont empiric rocephin for now. Await cx reuslts - e coli seen, but no sensitivities  Active Problems:    HTN (hypertension) -Established problem. Stable. 130/60  Plan: stay on same meds    PRABHA (acute kidney injury) ([facility] [address] -Established problem. Stable. Creat 0.7  Plan: cont to tx uti. Cont fluids    COVID-19 virus infection  Plan: on iv remdesivir day 3, conv plasma ordered 1/4.  Cont iv decadron Acute respiratory failure due to COVID-19 [facility])  Plan: cont tx as above      Disp - at this point, looking at SNF transfer 1/8          Nuzhat Moore  1/6/2021

## 2021-01-07 LAB
ANION GAP SERPL CALCULATED.3IONS-SCNC: 8 MMOL/L (ref 3–16)
BASOPHILS ABSOLUTE: 0 K/UL (ref 0–0.2)
BASOPHILS RELATIVE PERCENT: 0.3 %
BLOOD CULTURE, ROUTINE: NORMAL
BUN BLDV-MCNC: 26 MG/DL (ref 7–20)
CALCIUM SERPL-MCNC: 7.7 MG/DL (ref 8.3–10.6)
CHLORIDE BLD-SCNC: 107 MMOL/L (ref 99–110)
CO2: 24 MMOL/L (ref 21–32)
CREAT SERPL-MCNC: 0.9 MG/DL (ref 0.6–1.2)
CULTURE, BLOOD 2: NORMAL
D DIMER: 519 NG/ML DDU (ref 0–229)
EOSINOPHILS ABSOLUTE: 0 K/UL (ref 0–0.6)
EOSINOPHILS RELATIVE PERCENT: 0 %
GFR AFRICAN AMERICAN: >60
GFR NON-AFRICAN AMERICAN: 59
GLUCOSE BLD-MCNC: 149 MG/DL (ref 70–99)
HCT VFR BLD CALC: 39 % (ref 36–48)
HEMOGLOBIN: 12.3 G/DL (ref 12–16)
LYMPHOCYTES ABSOLUTE: 0.8 K/UL (ref 1–5.1)
LYMPHOCYTES RELATIVE PERCENT: 17.3 %
MCH RBC QN AUTO: 27.7 PG (ref 26–34)
MCHC RBC AUTO-ENTMCNC: 31.5 G/DL (ref 31–36)
MCV RBC AUTO: 87.9 FL (ref 80–100)
MONOCYTES ABSOLUTE: 0.5 K/UL (ref 0–1.3)
MONOCYTES RELATIVE PERCENT: 10.5 %
NEUTROPHILS ABSOLUTE: 3.5 K/UL (ref 1.7–7.7)
NEUTROPHILS RELATIVE PERCENT: 71.9 %
PDW BLD-RTO: 15.9 % (ref 12.4–15.4)
PLATELET # BLD: 239 K/UL (ref 135–450)
PMV BLD AUTO: 10.2 FL (ref 5–10.5)
POTASSIUM SERPL-SCNC: 4.2 MMOL/L (ref 3.5–5.1)
RBC # BLD: 4.44 M/UL (ref 4–5.2)
SODIUM BLD-SCNC: 139 MMOL/L (ref 136–145)
WBC # BLD: 4.8 K/UL (ref 4–11)

## 2021-01-07 PROCEDURE — 94761 N-INVAS EAR/PLS OXIMETRY MLT: CPT

## 2021-01-07 PROCEDURE — 2580000003 HC RX 258: Performed by: INTERNAL MEDICINE

## 2021-01-07 PROCEDURE — 85025 COMPLETE CBC W/AUTO DIFF WBC: CPT

## 2021-01-07 PROCEDURE — 97535 SELF CARE MNGMENT TRAINING: CPT

## 2021-01-07 PROCEDURE — 6370000000 HC RX 637 (ALT 250 FOR IP): Performed by: INTERNAL MEDICINE

## 2021-01-07 PROCEDURE — 94640 AIRWAY INHALATION TREATMENT: CPT

## 2021-01-07 PROCEDURE — 2500000003 HC RX 250 WO HCPCS: Performed by: INTERNAL MEDICINE

## 2021-01-07 PROCEDURE — 97530 THERAPEUTIC ACTIVITIES: CPT

## 2021-01-07 PROCEDURE — 85379 FIBRIN DEGRADATION QUANT: CPT

## 2021-01-07 PROCEDURE — 80048 BASIC METABOLIC PNL TOTAL CA: CPT

## 2021-01-07 PROCEDURE — 36415 COLL VENOUS BLD VENIPUNCTURE: CPT

## 2021-01-07 PROCEDURE — 1200000000 HC SEMI PRIVATE

## 2021-01-07 PROCEDURE — 6360000002 HC RX W HCPCS: Performed by: INTERNAL MEDICINE

## 2021-01-07 PROCEDURE — 2700000000 HC OXYGEN THERAPY PER DAY

## 2021-01-07 RX ORDER — GUAIFENESIN/DEXTROMETHORPHAN 100-10MG/5
5 SYRUP ORAL EVERY 4 HOURS PRN
Status: DISCONTINUED | OUTPATIENT
Start: 2021-01-07 | End: 2021-01-08 | Stop reason: HOSPADM

## 2021-01-07 RX ADMIN — Medication 2 PUFF: at 11:56

## 2021-01-07 RX ADMIN — GABAPENTIN 100 MG: 100 CAPSULE ORAL at 14:30

## 2021-01-07 RX ADMIN — VENLAFAXINE 75 MG: 37.5 TABLET ORAL at 08:06

## 2021-01-07 RX ADMIN — ENOXAPARIN SODIUM 40 MG: 40 INJECTION SUBCUTANEOUS at 21:50

## 2021-01-07 RX ADMIN — Medication 2 PUFF: at 16:46

## 2021-01-07 RX ADMIN — Medication 1 G: at 21:50

## 2021-01-07 RX ADMIN — HYDRALAZINE HYDROCHLORIDE 10 MG: 20 INJECTION INTRAMUSCULAR; INTRAVENOUS at 08:38

## 2021-01-07 RX ADMIN — ENOXAPARIN SODIUM 40 MG: 40 INJECTION SUBCUTANEOUS at 08:06

## 2021-01-07 RX ADMIN — CETIRIZINE HYDROCHLORIDE 10 MG: 10 TABLET, FILM COATED ORAL at 08:06

## 2021-01-07 RX ADMIN — Medication 2 PUFF: at 20:50

## 2021-01-07 RX ADMIN — Medication 2 PUFF: at 16:47

## 2021-01-07 RX ADMIN — DEXAMETHASONE SODIUM PHOSPHATE 6 MG: 10 INJECTION, SOLUTION INTRAMUSCULAR; INTRAVENOUS at 08:06

## 2021-01-07 RX ADMIN — Medication 10 ML: at 21:54

## 2021-01-07 RX ADMIN — Medication 2 PUFF: at 07:59

## 2021-01-07 RX ADMIN — REMDESIVIR 100 MG: 100 INJECTION, POWDER, LYOPHILIZED, FOR SOLUTION INTRAVENOUS at 11:26

## 2021-01-07 RX ADMIN — POTASSIUM CHLORIDE 20 MEQ: 750 TABLET, FILM COATED, EXTENDED RELEASE ORAL at 08:06

## 2021-01-07 RX ADMIN — GUAIFENESIN AND DEXTROMETHORPHAN 5 ML: 100; 10 SYRUP ORAL at 23:09

## 2021-01-07 RX ADMIN — GABAPENTIN 100 MG: 100 CAPSULE ORAL at 08:06

## 2021-01-07 RX ADMIN — DONEPEZIL HYDROCHLORIDE 5 MG: 5 TABLET, FILM COATED ORAL at 21:50

## 2021-01-07 RX ADMIN — Medication 10 ML: at 08:31

## 2021-01-07 RX ADMIN — TRAZODONE HYDROCHLORIDE 50 MG: 50 TABLET ORAL at 21:54

## 2021-01-07 RX ADMIN — GABAPENTIN 100 MG: 100 CAPSULE ORAL at 21:50

## 2021-01-07 ASSESSMENT — PAIN SCALES - GENERAL: PAINLEVEL_OUTOF10: 0

## 2021-01-07 NOTE — PROGRESS NOTES
Routine vitals stable. Patient on room air. NO signs of distress. No complaints of pain or SOB. Scheduled medications given. Pt denies any further needs at this time. Call light within reach. Will continue to monitor.

## 2021-01-07 NOTE — PROGRESS NOTES
PRN hydralazine given for elevated BP. Will monitor.     Vitals:    01/07/21 0837   BP: (!) 172/112   Pulse:    Resp:    Temp:    SpO2:

## 2021-01-07 NOTE — PROGRESS NOTES
Pt resting in bed, awake, calm and cooperative, A/O to self and place. Head to toe completed, meds administered. Call light within reach, fall precautions in place, will continue to monitor.

## 2021-01-07 NOTE — PROGRESS NOTES
Shift assessment complete. Patient on 4L nasal cannula baseline O2. Scheduled medications given. No signs of distress. Call light within reach. Bed alarm on. Will monitor.     Vitals:    01/07/21 0803   BP: (!) 171/79   Pulse: 76   Resp: 16   Temp: 97.5 °F (36.4 °C)   SpO2: 95%

## 2021-01-07 NOTE — PROGRESS NOTES
Progress Note - Dr. Chelo Mc - Internal Medicine  PCP: DO Santiago Caruso 55 [address] Day: 4  Code Status: Full Code  Current Diet: DIET GENERAL;        CC: follow up on medical issues    Subjective:   Veronica Sullivan is a 80 y.o. female. She denies problems    Doing ok  Still on 4L  remdesivir day 4    She denies chest pain, denies shortness of breath, denies nausea,  denies emesis. 10 system Review of Systems is reviewed with patient, and pertinent positives are noted in HPI above . Otherwise, Review of systems is negative. I have reviewed the patient's medical and social history in detail and updated the computerized patient record. To recap: She  has a past medical history of Arthritis, CHF (congestive heart failure) ([facility] COPD (chronic obstructive pulmonary disease) ([facility] [address] [facility] Dementia ([facility] [address] Depression, and Hypertension. . She  has a past surgical history that includes Hysterectomy. . She  reports that she has quit smoking. She has never used smokeless tobacco. She reports that she does not drink alcohol. .        Active Hospital Problems    Diagnosis Date Noted    COVID-19 [U07.1] 01/05/2021    Acute respiratory failure due to COVID-19 [facility]) [U07.1, J96.00] 01/04/2021    UTI (urinary tract infection) [N39.0] 01/03/2021    HTN (hypertension) [I10] 09/23/2018    [facility] [N17.9] 09/23/2018       Current Facility-Administered Medications: albuterol sulfate  (90 Base) MCG/ACT inhaler 2 puff, 2 puff, Inhalation, 4x daily  ipratropium (ATROVENT HFA) 17 MCG/ACT inhaler 2 puff, 2 puff, Inhalation, 4x daily  enoxaparin (LOVENOX) injection 40 mg, 40 mg, Subcutaneous, BID  [COMPLETED] remdesivir 200 mg in sodium chloride 0.9 % 250 mL IVPB, 200 mg, Intravenous, Once **FOLLOWED BY** remdesivir 100 mg in sodium chloride 0.9 % 250 mL IVPB, 100 mg, Intravenous, Q24H  0.9 % sodium chloride bolus, 30 mL, Intravenous, PRN  0.9 % sodium chloride infusion, , Intravenous, PRN  venlafaxine (EFFEXOR) tablet 75 mg, 75 mg, Oral, Daily  donepezil (ARICEPT) tablet 5 mg, 5 mg, Oral, Nightly  gabapentin (NEURONTIN) capsule 100 mg, 100 mg, Oral, TID  traZODone (DESYREL) tablet 50 mg, 50 mg, Oral, Nightly  potassium chloride (KLOR-CON) extended release tablet 20 mEq, 20 mEq, Oral, Daily  0.9 % sodium chloride infusion, , Intravenous, Continuous  sodium chloride flush 0.9 % injection 10 mL, 10 mL, Intravenous, 2 times per day  sodium chloride flush 0.9 % injection 10 mL, 10 mL, Intravenous, PRN  promethazine (PHENERGAN) tablet 12.5 mg, 12.5 mg, Oral, Q6H PRN **OR** ondansetron (ZOFRAN) injection 4 mg, 4 mg, Intravenous, Q6H PRN  magnesium hydroxide (MILK OF MAGNESIA) 400 MG/5ML suspension 30 mL, 30 mL, Oral, Daily PRN  acetaminophen (TYLENOL) tablet 650 mg, 650 mg, Oral, Q6H PRN **OR** acetaminophen (TYLENOL) suppository 650 mg, 650 mg, Rectal, Q6H PRN  [DISCONTINUED] azithromycin (ZITHROMAX) 500 mg in D5W 250ml Vial Mate, 500 mg, Intravenous, Q24H **AND** cefTRIAXone (ROCEPHIN) 1 g in sterile water 10 mL IV syringe, 1 g, Intravenous, Q24H  hydrALAZINE (APRESOLINE) injection 10 mg, 10 mg, Intravenous, Q6H PRN  0.9 % sodium chloride bolus, 500 mL, Intravenous, PRN  potassium chloride (KLOR-CON M) extended release tablet 40 mEq, 40 mEq, Oral, PRN **OR** potassium bicarb-citric acid (EFFER-K) effervescent tablet 40 mEq, 40 mEq, Oral, PRN **OR** potassium chloride 10 mEq/100 mL IVPB (Peripheral Line), 10 mEq, Intravenous, PRN  dexamethasone (PF) (DECADRON) injection 6 mg, 6 mg, Intravenous, Daily  cetirizine (ZYRTEC) tablet 10 mg, 10 mg, Oral, Daily  influenza quadrivalent split vaccine (FLUZONE;FLUARIX;FLULAVAL;AFLURIA) injection 0.5 mL, 0.5 mL, Intramuscular, Prior to discharge         Objective:  BP (!) 167/70   Pulse 77   Temp 97.5 °F (36.4 °C) (Oral)   Resp 16   Ht 4' 11\" (1.499 m)   Wt 225 lb 8 oz (102.3 kg)   SpO2 95%   BMI 45.55 kg/m²      Patient Vitals for the past 24 hrs:   BP Temp Temp src Pulse Resp SpO2 Weight   01/07/21 0842 (!) 167/70 -- -- 77 -- -- --   01/07/21 0837 (!) 172/112 -- -- 73 -- -- --   01/07/21 0803 (!) 171/79 97.5 °F (36.4 °C) Oral 76 16 95 % --   01/07/21 0759 -- -- -- -- 18 95 % --   01/07/21 0439 125/65 97.5 °F (36.4 °C) Oral 73 20 93 % 225 lb 8 oz (102.3 kg)   01/07/21 0030 122/80 97.6 °F (36.4 °C) Oral 67 20 95 % --   01/06/21 2034 -- -- -- -- 16 93 % --   01/06/21 1955 131/65 98.6 °F (37 °C) Axillary 82 22 95 % --   01/06/21 1644 -- -- -- -- 16 94 % --   01/06/21 1643 -- -- -- -- 16 94 % --   01/06/21 1330 113/73 98.1 °F (36.7 °C) Oral 81 14 92 % --   01/06/21 0959 99/62 97.5 °F (36.4 °C) Oral 71 18 94 % --     Patient Vitals for the past 96 hrs (Last 3 readings):   Weight   01/07/21 0439 225 lb 8 oz (102.3 kg)   01/06/21 0453 222 lb (100.7 kg)   01/05/21 0456 219 lb (99.3 kg)           Intake/Output Summary (Last 24 hours) at 1/7/2021 0851  Last data filed at 1/6/2021 1346  Gross per 24 hour   Intake 240 ml   Output 1400 ml   Net -1160 ml         Physical Exam:   BP (!) 167/70   Pulse 77   Temp 97.5 °F (36.4 °C) (Oral)   Resp 16   Ht 4' 11\" (1.499 m)   Wt 225 lb 8 oz (102.3 kg)   SpO2 95%   BMI 45.55 kg/m²   General appearance: alert, appears stated age and cooperative  Head: Normocephalic, without obvious abnormality, atraumatic  Lungs: crackles in bases  Heart: regular rate and rhythm, S1, S2 normal, no murmur, click, rub or gallop  Abdomen: soft, non-tender; bowel sounds normal; no masses,  no organomegaly  Extremities: extremities normal, atraumatic, no cyanosis or edema    Labs:  Lab Results   Component Value Date    WBC 4.8 01/07/2021    HGB 12.3 01/07/2021    HCT 39.0 01/07/2021     01/07/2021    CHOL 203 (H) 08/23/2018    TRIG 144 08/23/2018    HDL 63 (H) 08/23/2018    ALT 12 01/04/2021    AST 27 01/04/2021     01/07/2021    K 4.2 01/07/2021     01/07/2021    CREATININE 0.9 01/07/2021    BUN 26 (H) 01/07/2021    CO2 24 01/07/2021    TSH 3.35 08/23/2018    INR 1.05 01/03/2021    LABMICR YES 01/03/2021     Lab Results   Component Value Date    CKTOTAL 97 01/03/2021    TROPONINI 0.01 01/03/2021       Recent Imaging Results are Reviewed:  Xr Chest Portable    Result Date: 1/4/2021  EXAMINATION: ONE XRAY VIEW OF THE CHEST 1/4/2021 6:53 am COMPARISON: 01/03/2021, 09/25/2018 HISTORY: ORDERING SYSTEM PROVIDED HISTORY: cough, in isolation TECHNOLOGIST PROVIDED HISTORY: Reason for exam:->cough, in isolation Reason for Exam: cough, in isolation Acuity: Unknown Type of Exam: Unknown FINDINGS: A single frontal view of the chest was performed. There is no acute skeletal abnormality. The heart size and mediastinal contours are stable, with stable mild right paratracheal opacity representing vascular shadows. There are persistent bibasilar airspace opacities which have slightly progressed in comparison with yesterday's exam.  The mid and upper lung zones remain clear. There is no evidence of a pneumothorax. Interval progression of mild bibasilar pneumonia. Xr Chest Portable    Result Date: 1/3/2021  EXAMINATION: ONE XRAY VIEW OF THE CHEST 1/3/2021 3:07 pm COMPARISON: 09/25/2018 HISTORY: ORDERING SYSTEM PROVIDED HISTORY: SOB TECHNOLOGIST PROVIDED HISTORY: Reason for exam:->SOB Reason for Exam: Fatigue (Pt arrived via [facility] squad from home for report of weakness/fatigue, vomiting, diarrhea, and poor PO intake x3 days. ) Acuity: Unknown Type of Exam: Unknown FINDINGS: The cardial pericardial silhouette is stable in size. There is increased interstitial opacity found throughout both lungs. Pulmonary vasculature is borderline congested. No pneumothorax is seen. No free air. No acute bony abnormality. Findings compatible with pulmonary interstitial edema. Consider both cardiogenic and noncardiogenic etiologies, including atypical infections.      Ct Chest Abdomen Pelvis Wo Contrast    Result Date: 1/3/2021  EXAMINATION: CT OF THE CHEST, ABDOMEN, AND PELVIS WITHOUT CONTRAST 1/3/2021 4:12 pm TECHNIQUE: CT of the chest, abdomen and pelvis was performed without the administration of intravenous contrast. Multiplanar reformatted images are provided for review. Dose modulation, iterative reconstruction, and/or weight based adjustment of the mA/kV was utilized to reduce the radiation dose to as low as reasonably achievable. COMPARISON: None HISTORY: ORDERING SYSTEM PROVIDED HISTORY: uti, r/o obstruction, poss pnuemonia TECHNOLOGIST PROVIDED HISTORY: Reason for exam:->uti, r/o obstruction, poss pnuemonia Additional Contrast?->None Reason for Exam: uti, r/o obstruction, poss pnuemonia Acuity: Acute Type of Exam: Initial FINDINGS: Chest: Mediastinum: Evaluation of mediastinal structures is limited without intravenous contrast.  That said, cardiac chambers are unremarkable. No pericardial effusion. Noncontrast imaging of the thoracic aorta unremarkable. Main pulmonary artery is prominent, raising the possibility of pulmonary hypertension. Otherwise, pulmonary arteries are unremarkable. Visualized thyroid unremarkable. No pathologically enlarged mediastinal or hilar lymph nodes. Esophagus is unremarkable. Lungs/pleura: There are patchy areas of ground-glass opacity noted within the lower lungs, greater on the right. Dependent atelectasis noted bilaterally. Chronic interstitial opacities are seen in the background. Emphysema noted. There is diffuse bronchial wall thickening. Narrowing of the mainstem bronchi is noted, which is likely secondary to expiratory imaging. Soft Tissues/Bones: No acute bony abnormalities are identified. Visualized extra thoracic soft tissues are unremarkable. Abdomen/Pelvis: Evaluation of the solid and hollow abdominal viscera is limited without intravenous contrast. Organs: That said, no acute hepatic abnormality identified.   Gallstone is noted within the gallbladder fundus measuring up to 1.9 cm. No other CT evidence of cholecystitis is seen. Noncontrast imaging of the spleen, adrenals, and pancreas is unremarkable. No acute renal abnormalities are identified. No hydronephrosis or hydroureter. GI/Bowel: Moderate diverticulosis of the large bowel is present without CT evidence of diverticulitis. The large bowel is otherwise unremarkable in appearance. The appendix is not well visualized but no asymmetric pericecal inflammation is seen. Distal esophagus, stomach, duodenal sweep, and the remainder of the small bowel are unremarkable in appearance. No evidence of bowel obstruction. Pelvis: Urinary bladder unremarkable. No free pelvic fluid. Peritoneum/Retroperitoneum: No retroperitoneal lymphadenopathy identified. The abdominal aorta is at the upper limits of normal at 2.8 cm. Bones/Soft Tissues: The extra-abdominal and extra pelvic soft tissues are unremarkable. No acute or suspicious bony abnormality. CHEST CT: Patchy areas of ground-glass opacity within the lower lungs, greater on the right, overall mild in degree. While nonspecific, the findings are concerning for multifocal pneumonia, especially viral pneumonia. Emphysema. Bronchial wall thickening, which may be seen in inflammatory conditions such as bronchitis, reactive airways disease, and smoking. ABDOMEN AND PELVIS CT: No acute abnormality identified. Uncomplicated cholelithiasis. Moderate diverticulosis of the large bowel, but without CT evidence of diverticulitis. Assessment and Plan:  Principal Problem:    COVID-19 -Established problem. Stable. Still on o2. Plan: cont iv decadron, cont iv remdesivir, day 4/5. Will finish course for now. May need to go home on home o2. Active Problems:    HTN (hypertension) -Established problem. Stable. 167/70  Plan: see if improves with AM meds    PRABHA (acute kidney injury) ([facility] -Established problem, now resolved. Creat 0.9  Plan: Continue present orders/plan.      UTI (urinary

## 2021-01-07 NOTE — PROGRESS NOTES
Physical Therapy  Facility/Department: [address] ONCOLOGY  Daily Treatment Note  NAME: Sage Ng  : 1931  MRN: [id number]    Date of Service: 2021    Discharge Recommendations:  Sage Ng scored a 14/24 on the AM-PAC short mobility form. Current research shows that an AM-PAC score of 17 or less is typically not associated with a discharge to the patient's home setting. Based on the patient's AM-PAC score and their current functional mobility deficits, it is recommended that the patient have 3-5 sessions per week of Physical Therapy at d/c to increase the patient's independence. Please see assessment section for further patient specific details. If patient discharges prior to next session this note will serve as a discharge summary. Please see below for the latest assessment towards goals. 3-5 sessions per week   PT Equipment Recommendations  Equipment Needed: No    Assessment   Body structures, Functions, Activity limitations: Decreased functional mobility ; Decreased ADL status; Decreased strength;Decreased cognition;Decreased endurance;Decreased balance;Decreased posture  Assessment: Patient contiues to demonstrate decreased functional mobility, strength and balance. Continue to recommend skilled PT to address above defcits and facilitate return to baseline function  Treatment Diagnosis: weakness, difficulty with gait. PT Education: PT Role;Plan of Care;Energy Conservation;Orientation; Functional Mobility Training  Patient Education: Pt verbalized understanding but needs reinforcement. Barriers to Learning: cognition  REQUIRES PT FOLLOW UP: Yes  Activity Tolerance  Activity Tolerance: Patient limited by endurance; Patient limited by fatigue  Activity Tolerance:  session: 95% on RA at rest, desats 79% on RA s/p bed mob, increased 2L and pt 84% s/p stand step transfer from EOB > BSC, increased to 3L satting 87%, increased to 4L and pt 88-89% s/p stand step transfer from [facility] > recliner and additional stand for donning brief. Recovered to 96% on 4L O2 at rest. Able to return to RA and SpO2 93% at rest. Alerted nursing. Patient Diagnosis(es): The primary encounter diagnosis was Acute cystitis without hematuria. Diagnoses of Nausea vomiting and diarrhea, Suspected COVID-19 virus infection, and PRABHA (acute kidney injury) ([facility] [address] were also pertinent to this visit. has a past medical history of Arthritis, CHF (congestive heart failure) ([facility] [address] COPD (chronic obstructive pulmonary disease) ([facility] Depression, and Hypertension. has a past surgical history that includes Hysterectomy. Restrictions  Restrictions/Precautions  Restrictions/Precautions: Fall Risk(high fall risk; general diet)  Required Braces or Orthoses?: No  Position Activity Restriction  Other position/activity restrictions: 80 y.o. female presents to the emergency department today with her daughter for concerns of general fatigue. The patient does have a history of dementia, and she is otherwise acting at baseline. The patient apparently lives with her grandson and his wife. The daughter is at bedside and gives most of the history she states that over the past 3 days the patient has had general fatigue, decreasing appetite, cough, vomiting and diarrhea. She states that the patient had a fever of 100 today, she is afebrile when she arrives to the ED. Patient is started with vomiting today, there is been no reports of any bilious emesis, hematemesis reported emesis. The patient has had multiple episodes of diarrhea, there have been no blood in the stool black tarry appearance to stool. Her cough has been dry. There is been no sputum production or hemoptysis. The patient has not had any urinary symptoms. She is acting at baseline. Her grandson and his wife are sick as well, and are being tested for COVID-19.   Subjective   General  Chart Reviewed: Yes  Family / Caregiver Present: No  Subjective  Subjective: Denied pain at rest. Agreeable to therapy. Eager to get OOB  General Comment  Comments: supine in bed upon arrival with alarm on. Rick Cramp in place          Orientation  Orientation  Orientation Level: Oriented to place;Oriented to time;Disoriented to situation;Oriented to person  Cognition      Objective   Bed mobility  Supine to Sit: Minimal assistance(HOB elevated, increased time, decreased initation of task)  Transfers  Sit to Stand: Minimal Assistance(from bed, BSC and chair)  Stand to sit: Minimal Assistance  Ambulation  Ambulation?: Yes  Ambulation 1  Surface: level tile  Device: Rolling Walker  Other Apparatus: O2  Assistance: Contact guard assistance  Gait Deviations: Slow Padmini;Decreased step length;Decreased step height  Distance: 3-4 feet bed to BSC, BSC to chair     Balance  Sitting - Dynamic: Good  Standing - Static: Fair(CGA for static standing with RW 1-2 minutes x 2 trials)  Standing - Dynamic: Fair                                          AM-PAC Score  AM-PAC Inpatient Mobility Raw Score : 14 (01/07/21 1625)  AM-PAC Inpatient T-Scale Score : 38.1 (01/07/21 1625)  Mobility Inpatient CMS 0-100% Score: 61.29 (01/07/21 1625)  Mobility Inpatient CMS G-Code Modifier : CL (01/07/21 1625)          Goals  Short term goals  Time Frame for Short term goals: to be met by DC  Short term goal 1: Pt to perform bed mob with CGA. Short term goal 2: Pt to perform transfers with CGA. Short term goal 3: Pt to perform amb with AAD and SBA for 30 ft. Long term goals  Time Frame for Long term goals : LTGs=STGs  Patient Goals   Patient goals : To return home  No goals met this treatment.     Plan    Plan  Times per week: 3-5x/week  Times per day: Daily  Current Treatment Recommendations: Strengthening, Balance Training, Functional Mobility Training, Transfer Training, ADL/Self-care Training, Endurance Training, Gait Training, Cognitive Reorientation, Home Exercise Program, Safety Education & Training, Patient/Caregiver Education & Training, Equipment Evaluation, Education, & procurement  Safety Devices  Type of devices:  All fall risk precautions in place, Call light within reach, Chair alarm in place, Gait belt, Left in chair, Nurse notified  Restraints  Initially in place: No     Therapy Time   Individual Concurrent Group Co-treatment   Time In 1459         Time Out 1542         Minutes 43         Timed Code Treatment Minutes: [address] [facility] [place], PT     [place], Lane Lomeli, PT, DPT [id number]

## 2021-01-07 NOTE — PROGRESS NOTES
Occupational Therapy  Facility/Department: [address] ONCOLOGY  Daily Treatment Note  NAME: Perla Aguilar  : 1931  MRN: [id number]    Date of Service: 2021    Discharge Recommendations: Perla Aguilar scored a 13/24 on the AM-PAC ADL Inpatient form. Current research shows that an AM-PAC score of 17 or less is typically not associated with a discharge to the patient's home setting. Based on the patient's AM-PAC score and their current ADL deficits, it is recommended that the patient have 3-5 sessions per week of Occupational Therapy at d/c to increase the patient's independence. Please see assessment section for further patient specific details. If patient discharges prior to next session this note will serve as a discharge summary. Please see below for the latest assessment towards goals. Assessment   Performance deficits / Impairments: Decreased functional mobility ; Decreased endurance;Decreased cognition;Decreased high-level IADLs;Decreased strength;Decreased balance;Decreased ADL status  Assessment: Pt is limited in the above areas impacting safety and independence in ADLs and functional mobility. Pt would benefit from skilled inpatient OT services to address these deficits.   Treatment Diagnosis: Decreased functional status secondary to UTI and COVID-19  Prognosis: Fair  OT Education: OT Role;Plan of Care;Transfer Training  Patient Education: d/c- pt verbalized understanding; would benefit from reinforcement due to cognitive deficits  Barriers to Learning: cognition, hx dementia  REQUIRES OT FOLLOW UP: Yes  Activity Tolerance  Activity Tolerance: Patient Tolerated treatment well;Treatment limited secondary to medical complications (free text)  Activity Tolerance:  session: 95% on RA at rest, desats 79% on RA s/p bed mob, increased 2L and pt 84% s/p stand step transfer from EOB > BSC, increased to 3L satting 87%, increased to 4L and pt 88-89% s/p stand step transfer from [facility] > recliner and additional stand for donning brief. Recovered to 96% on 4L O2 at rest. Able to return to RA and SpO2 93% at rest. Alerted nursing. Safety Devices  Safety Devices in place: Yes  Type of devices: All fall risk precautions in place; Left in chair;Nurse notified;Call light within reach;Gait belt; Chair alarm in place  Restraints  Initially in place: No         Patient Diagnosis(es): The primary encounter diagnosis was Acute cystitis without hematuria. Diagnoses of Nausea vomiting and diarrhea, Suspected COVID-19 virus infection, and PRABHA (acute kidney injury) ([facility] [address] were also pertinent to this visit. has a past medical history of Arthritis, CHF (congestive heart failure) ([facility] [address] COPD (chronic obstructive pulmonary disease) ([facility] [address] Dementia ([facility] [address] Depression, and Hypertension. has a past surgical history that includes Hysterectomy. Restrictions  Restrictions/Precautions  Restrictions/Precautions: Fall Risk(high fall risk; general diet)  Required Braces or Orthoses?: No  Position Activity Restriction  Sternal Precautions: 1/7 session: 95% on RA at rest, desats 79% on RA s/p bed mob, increased 2L and pt 84% s/p stand step transfer from EOB > BSC, increased to 3L satting 87%, increased to 4L and pt 88-89% s/p stand step transfer from [facility] > recliner and additional stand for donning brief. Recovered to 96% on 4L O2 at rest. Able to return to RA and SpO2 93% at rest. Alerted nursing. Other position/activity restrictions: 80 y.o. female presents to the emergency department today with her daughter for concerns of general fatigue. The patient does have a history of dementia, and she is otherwise acting at baseline. The patient apparently lives with her grandson and his wife. The daughter is at bedside and gives most of the history she states that over the past 3 days the patient has had general fatigue, decreasing appetite, cough, vomiting and diarrhea.   She states that the patient had a fever of 100 today, she is afebrile when she arrives to the ED. Patient is started with vomiting today, there is been no reports of any bilious emesis, hematemesis reported emesis. The patient has had multiple episodes of diarrhea, there have been no blood in the stool black tarry appearance to stool. Her cough has been dry. There is been no sputum production or hemoptysis. The patient has not had any urinary symptoms. She is acting at baseline. Her grandson and his wife are sick as well, and are being tested for COVID-19. Subjective   General  Chart Reviewed: Yes  Patient assessed for rehabilitation services?: Yes  Family / Caregiver Present: No  Diagnosis: UTI, COVID-19  Subjective  Subjective: Pt supine in bed upon arrival, very [place], pleasant and agreeable to treatment  Pre Treatment Pain Screening  Intervention List: Patient able to continue with treatment  Vital Signs  Patient Currently in Pain: Denies     Orientation  Orientation  Overall Orientation Status: Impaired  Orientation Level: Oriented to person;Disoriented to person;Disoriented to place; Disoriented to time     Objective    ADL  Feeding: Setup  Grooming: Stand by assistance;Setup(washing face)  UE Bathing: Minimal assistance;Setup;Verbal cueing  UE Dressing: Minimal assistance;Setup  LE Dressing: Dependent/Total(doffing/donning depends)  Toileting: Dependent/Total(pericare s/p voiding at [facility])        Balance  Sitting Balance: Stand by assistance  Standing Balance: Contact guard assistance  Standing Balance  Time: ~1-2 min  Activity: functional transfer, pericare  Comment: RW  Functional Mobility  Functional - Mobility Device: Rolling Walker  Activity: Other  Assist Level: Contact guard assistance  Functional Mobility Comments: EOB > BSC, BSC > recliner  Toilet Transfers  Toilet - Technique: Stand step  Equipment Used: Standard bedside commode  Toilet Transfer: Minimal assistance  Bed mobility  Supine to Sit: Minimal assistance(HOB elevated, increased time, decreased initation of task)  Transfers  Sit to stand: Minimal assistance  Stand to sit: Minimal assistance         Cognition  Overall Cognitive Status: Exceptions  Arousal/Alertness: Appropriate responses to stimuli(hard of hearing; requires very loud volume to respond)  Following Commands: Follows one step commands with repetition; Follows one step commands with increased time  Attention Span: Difficulty dividing attention  Memory: Decreased recall of recent events;Decreased short term memory  Problem Solving: Assistance required to generate solutions;Assistance required to correct errors made;Assistance required to implement solutions;Decreased awareness of errors;Assistance required to identify errors made  Insights: Decreased awareness of deficits  Initiation: Requires cues for some  Sequencing: Requires cues for some  Cognition Comment: hx dementia     Perception  Overall Perceptual Status: [facility]            Plan   Plan  Times per week: 3-5, cotx  Times per day: Daily  Current Treatment Recommendations: Self-Care / ADL, Strengthening, Functional Mobility Training, Endurance Training, Patient/Caregiver Education & Training  G-Code     OutComes Score                                                  AM-PAC Score        AM-[facility] Inpatient Daily Activity Raw Score: 13 (01/07/21 1636)  AM-PAC Inpatient ADL T-Scale Score : 32.03 (01/07/21 1636)  ADL Inpatient CMS 0-100% Score: 63.03 (01/07/21 1636)  ADL Inpatient CMS G-Code Modifier : CL (01/07/21 1636)    Goals  Short term goals  Time Frame for Short term goals: d/c  Short term goal 1: Pt will complete bed mobility SBA. - ongoing 1/7  Short term goal 2: Pt will complete functional transfer SBA. - ongoing 1/7  Short term goal 3: Pt will complete functional mobility CGA. - ongoing 1/7  Short term goal 4: Pt will complete toileting min A.- ongoing 1/7  Short term goal 5: Pt will complete LB ADLs mod A.- ongoing 1/7  Patient Goals   Patient goals : did not verbalize on this date Therapy Time   Individual Concurrent Group Co-treatment   Time In       6553   Time Out       1542   Minutes       43      Timed Code Treatment Minutes:  43 Minutes    Total Treatment Minutes:  43 minutes    Norman Alcaraz OTR/L [id number]    Hallie Jameson OT

## 2021-01-07 NOTE — PROGRESS NOTES
Updates given to patient's daughter. Patient's daughter states she will decide on the patient's discharge plan today.

## 2021-01-08 VITALS
SYSTOLIC BLOOD PRESSURE: 139 MMHG | OXYGEN SATURATION: 94 % | DIASTOLIC BLOOD PRESSURE: 76 MMHG | HEIGHT: 59 IN | WEIGHT: 224.7 LBS | HEART RATE: 76 BPM | RESPIRATION RATE: 18 BRPM | BODY MASS INDEX: 45.3 KG/M2 | TEMPERATURE: 97.6 F

## 2021-01-08 LAB
ANION GAP SERPL CALCULATED.3IONS-SCNC: 9 MMOL/L (ref 3–16)
BASOPHILS ABSOLUTE: 0 K/UL (ref 0–0.2)
BASOPHILS RELATIVE PERCENT: 0 %
BUN BLDV-MCNC: 28 MG/DL (ref 7–20)
CALCIUM SERPL-MCNC: 8.3 MG/DL (ref 8.3–10.6)
CHLORIDE BLD-SCNC: 104 MMOL/L (ref 99–110)
CO2: 26 MMOL/L (ref 21–32)
CREAT SERPL-MCNC: 0.8 MG/DL (ref 0.6–1.2)
EOSINOPHILS ABSOLUTE: 0 K/UL (ref 0–0.6)
EOSINOPHILS RELATIVE PERCENT: 0 %
GFR AFRICAN AMERICAN: >60
GFR NON-AFRICAN AMERICAN: >60
GLUCOSE BLD-MCNC: 119 MG/DL (ref 70–99)
HCT VFR BLD CALC: 41.2 % (ref 36–48)
HEMOGLOBIN: 13.2 G/DL (ref 12–16)
LYMPHOCYTES ABSOLUTE: 0.6 K/UL (ref 1–5.1)
LYMPHOCYTES RELATIVE PERCENT: 9 %
MCH RBC QN AUTO: 28 PG (ref 26–34)
MCHC RBC AUTO-ENTMCNC: 32 G/DL (ref 31–36)
MCV RBC AUTO: 87.7 FL (ref 80–100)
MONOCYTES ABSOLUTE: 0.6 K/UL (ref 0–1.3)
MONOCYTES RELATIVE PERCENT: 8 %
NEUTROPHILS ABSOLUTE: 5.9 K/UL (ref 1.7–7.7)
NEUTROPHILS RELATIVE PERCENT: 83 %
PDW BLD-RTO: 15.4 % (ref 12.4–15.4)
PLATELET # BLD: 293 K/UL (ref 135–450)
PLATELET SLIDE REVIEW: ADEQUATE
PMV BLD AUTO: 9.2 FL (ref 5–10.5)
POTASSIUM SERPL-SCNC: 4.3 MMOL/L (ref 3.5–5.1)
RBC # BLD: 4.7 M/UL (ref 4–5.2)
RBC # BLD: NORMAL 10*6/UL
SLIDE REVIEW: ABNORMAL
SODIUM BLD-SCNC: 139 MMOL/L (ref 136–145)
WBC # BLD: 7.1 K/UL (ref 4–11)

## 2021-01-08 PROCEDURE — 6370000000 HC RX 637 (ALT 250 FOR IP): Performed by: INTERNAL MEDICINE

## 2021-01-08 PROCEDURE — 2580000003 HC RX 258: Performed by: INTERNAL MEDICINE

## 2021-01-08 PROCEDURE — 36415 COLL VENOUS BLD VENIPUNCTURE: CPT

## 2021-01-08 PROCEDURE — 85025 COMPLETE CBC W/AUTO DIFF WBC: CPT

## 2021-01-08 PROCEDURE — 80048 BASIC METABOLIC PNL TOTAL CA: CPT

## 2021-01-08 PROCEDURE — 2700000000 HC OXYGEN THERAPY PER DAY

## 2021-01-08 PROCEDURE — 94640 AIRWAY INHALATION TREATMENT: CPT

## 2021-01-08 PROCEDURE — 94761 N-INVAS EAR/PLS OXIMETRY MLT: CPT

## 2021-01-08 PROCEDURE — 94680 O2 UPTK RST&XERS DIR SIMPLE: CPT

## 2021-01-08 PROCEDURE — 2500000003 HC RX 250 WO HCPCS: Performed by: INTERNAL MEDICINE

## 2021-01-08 PROCEDURE — 6360000002 HC RX W HCPCS: Performed by: INTERNAL MEDICINE

## 2021-01-08 RX ORDER — DEXAMETHASONE 2 MG/1
2 TABLET ORAL 2 TIMES DAILY WITH MEALS
Qty: 14 TABLET | Refills: 0 | Status: SHIPPED | OUTPATIENT
Start: 2021-01-08 | End: 2021-01-15

## 2021-01-08 RX ORDER — CEPHALEXIN 500 MG/1
500 CAPSULE ORAL 3 TIMES DAILY
Qty: 30 CAPSULE | Refills: 0 | Status: SHIPPED | OUTPATIENT
Start: 2021-01-08 | End: 2021-01-18

## 2021-01-08 RX ADMIN — Medication 2 PUFF: at 09:14

## 2021-01-08 RX ADMIN — ENOXAPARIN SODIUM 40 MG: 40 INJECTION SUBCUTANEOUS at 09:43

## 2021-01-08 RX ADMIN — Medication 10 ML: at 09:43

## 2021-01-08 RX ADMIN — CETIRIZINE HYDROCHLORIDE 10 MG: 10 TABLET, FILM COATED ORAL at 09:44

## 2021-01-08 RX ADMIN — GABAPENTIN 100 MG: 100 CAPSULE ORAL at 09:44

## 2021-01-08 RX ADMIN — REMDESIVIR 100 MG: 100 INJECTION, POWDER, LYOPHILIZED, FOR SOLUTION INTRAVENOUS at 10:49

## 2021-01-08 RX ADMIN — VENLAFAXINE 75 MG: 37.5 TABLET ORAL at 09:44

## 2021-01-08 RX ADMIN — Medication 2 PUFF: at 12:14

## 2021-01-08 RX ADMIN — Medication 2 PUFF: at 12:16

## 2021-01-08 RX ADMIN — POTASSIUM CHLORIDE 20 MEQ: 750 TABLET, FILM COATED, EXTENDED RELEASE ORAL at 09:43

## 2021-01-08 RX ADMIN — DEXAMETHASONE SODIUM PHOSPHATE 6 MG: 10 INJECTION, SOLUTION INTRAMUSCULAR; INTRAVENOUS at 09:43

## 2021-01-08 NOTE — PROGRESS NOTES
[facility] home care referral. Spoke with pt and re: home care plan of care/services. Agreeable. Demographic's verified. Will follow for home care.

## 2021-01-08 NOTE — PROGRESS NOTES
Spoke with daughter Yolanda Reyna. Yolanda Reyna OK with DC home. Yolanda Reyna requesting order for lift chair.

## 2021-01-08 NOTE — PROGRESS NOTES
Assessment complete. VSS. Patient resting in bed. Respirations even and easy. Call light in reach. Fall precautions in place. No needs expressed at this time. Will continue to monitor. Pt was on 2L O2. Pt states she does not wear O2 at home. Pt had removed O2 to blow nose and O2 remained at 94%.  O2 left off for remainder of assessment and pt maintained O2 of 90-92%

## 2021-01-08 NOTE — PROGRESS NOTES
Home Oxygen Evaluation  Option 1      Option 2        Patients room air at rest saturation SpO2 94%       Patients room air saturation SpO2 90% with exertion. Shannon Almaraz

## 2021-01-08 NOTE — PROGRESS NOTES
Routine vital signs stable. O2 sats 92%  On 2L NC. Patients O2 sats decrease when coughing. PRN robitussin given to help with cough. Patients head to toe assessment completed. A & O x2. Scheduled medications given per MAR. Tolerated well. Bed alarm engaged. Call light within reach. Bed in low position. Patient resting in bed. Sent message to Dr. Melvin Monroy : This patient is covid +. She is on continuous fluids. Pt has crackles and is having a continuous productive cough. Do you recommended stopping fluids? Na- 139. She would also like something for her cough. Dr. Melvin Monroy response :Can stop fluids.  Add robitussin dm 1 tsp q4h prn for cough

## 2021-01-08 NOTE — DISCHARGE SUMMARY
[facility] -- Physician Discharge Summary     Marvel Stauffer  8/6/1931  MRN: [id number]    Admit Date: 1/3/2021  Discharge Date: No discharge date for patient encounter.     Attending MD: Carlos Kovacs MD  Discharging MD: Carlos Kovacs MD  [facility] [address] [facility] [facility] [phone]    Admission Diagnosis: UTI (urinary tract infection) [N39.0]  DISCHARGE DIAGNOSIS: covid 19    Full Hospital Problem List:  Active Hospital Problems    Diagnosis Date Noted    COVID-19 [U07.1] 01/05/2021    Acute respiratory failure due to COVID-19 [facility]) [U07.1, J96.00] 01/04/2021    UTI (urinary tract infection) [N39.0] 01/03/2021    HTN (hypertension) [I10] 09/23/2018    PRABHA (acute kidney injury) ([facility] [address] [N17.9] 09/23/2018           Hospital Course:  80 y.o. female presents to the emergency department today with her daughter for concerns of general fatigue.  The patient does have a history of dementia, and she is otherwise acting at baseline.  The patient apparently lives with her grandson and his wife. Alana Knight daughter is at bedside and gives most of the history she states that over the past 3 days the patient has had general fatigue, decreasing appetite, cough, vomiting and diarrhea. Sosa Atul states that the patient had a fever of 100 today, she is afebrile when she arrives to the ED.  Patient is started with vomiting today, there is been no reports of any bilious emesis, hematemesis reported emesis.  The patient has had multiple episodes of diarrhea, there have been no blood in the stool black tarry appearance to stool.  Her cough has been dry. Ilana Zhou is been no sputum production or hemoptysis.  The patient has not had any urinary symptoms. Sosa Pollard is acting at baseline. Rancoh serna and his wife are sick as well, and are being tested for COVID-19.     Given her sx, high suspicison for pt to also have covid  Pt with 5L o2 requirement  COVID swab is positive      U/A in Er positive for UTI   Susceptibility    Escherichia coli (1)    Antibiotic Interpretation LIDIA Status    ampicillin Sensitive 4 mcg/mL     ceFAZolin Sensitive <=4 mcg/mL      NOTE: Cefazolin should only be used for uncomplicated UTI         for E.coli or Klebsiella pneumoniae. cefepime Sensitive <=0.12 mcg/mL     cefTRIAXone Sensitive <=0.25 mcg/mL     ciprofloxacin Sensitive <=0.25 mcg/mL     ertapenem Sensitive <=0.12 mcg/mL     gentamicin Sensitive <=1 mcg/mL     levofloxacin Sensitive <=0.12 mcg/mL     nitrofurantoin Sensitive <=16 mcg/mL     piperacillin-tazobactam Sensitive <=4 mcg/mL     trimethoprim-sulfamethoxazole Sensitive <=20 mcg/mL         Pt placed on iv rocephin on admit  This is changed to po keflex on d/c    PT/OT rec SNF  Family is considering taking pt home with home care  She will need home o2 on d/c as she still has 2L requirement    Consults made during Hospitalization:  IP CONSULT TO PRIMARY CARE PROVIDER  IP CONSULT TO SOCIAL WORK    Treatment team at time of Discharge: Treatment Team: Attending Provider: Cr Singer MD; Consulting Physician: Cr Singer MD; Utilization Reviewer: Kalpesh Vazquze RN; Registered Nurse: Loc Trujillo RN    Imaging Results:  Xr Chest Portable    Result Date: 1/4/2021  EXAMINATION: ONE XRAY VIEW OF THE CHEST 1/4/2021 6:53 am COMPARISON: 01/03/2021, 09/25/2018 HISTORY: ORDERING SYSTEM PROVIDED HISTORY: cough, in isolation TECHNOLOGIST PROVIDED HISTORY: Reason for exam:->cough, in isolation Reason for Exam: cough, in isolation Acuity: Unknown Type of Exam: Unknown FINDINGS: A single frontal view of the chest was performed. There is no acute skeletal abnormality. The heart size and mediastinal contours are stable, with stable mild right paratracheal opacity representing vascular shadows. There are persistent bibasilar airspace opacities which have slightly progressed in comparison with yesterday's exam.  The mid and upper lung zones remain clear.  There is no evidence of a pneumothorax. Interval progression of mild bibasilar pneumonia. Xr Chest Portable    Result Date: 1/3/2021  EXAMINATION: ONE XRAY VIEW OF THE CHEST 1/3/2021 3:07 pm COMPARISON: 09/25/2018 HISTORY: ORDERING SYSTEM PROVIDED HISTORY: SOB TECHNOLOGIST PROVIDED HISTORY: Reason for exam:->SOB Reason for Exam: Fatigue (Pt arrived via [address] squad from home for report of weakness/fatigue, vomiting, diarrhea, and poor PO intake x3 days. ) Acuity: Unknown Type of Exam: Unknown FINDINGS: The cardial pericardial silhouette is stable in size. There is increased interstitial opacity found throughout both lungs. Pulmonary vasculature is borderline congested. No pneumothorax is seen. No free air. No acute bony abnormality. Findings compatible with pulmonary interstitial edema. Consider both cardiogenic and noncardiogenic etiologies, including atypical infections. Ct Chest Abdomen Pelvis Wo Contrast    Result Date: 1/3/2021  EXAMINATION: CT OF THE CHEST, ABDOMEN, AND PELVIS WITHOUT CONTRAST 1/3/2021 4:12 pm TECHNIQUE: CT of the chest, abdomen and pelvis was performed without the administration of intravenous contrast. Multiplanar reformatted images are provided for review. Dose modulation, iterative reconstruction, and/or weight based adjustment of the mA/kV was utilized to reduce the radiation dose to as low as reasonably achievable. COMPARISON: None HISTORY: ORDERING SYSTEM PROVIDED HISTORY: uti, r/o obstruction, poss pnuemonia TECHNOLOGIST PROVIDED HISTORY: Reason for exam:->uti, r/o obstruction, poss pnuemonia Additional Contrast?->None Reason for Exam: uti, r/o obstruction, poss pnuemonia Acuity: Acute Type of Exam: Initial FINDINGS: Chest: Mediastinum: Evaluation of mediastinal structures is limited without intravenous contrast.  That said, cardiac chambers are unremarkable. No pericardial effusion. Noncontrast imaging of the thoracic aorta unremarkable.   Main pulmonary artery is prominent, raising the possibility of pulmonary hypertension. Otherwise, pulmonary arteries are unremarkable. Visualized thyroid unremarkable. No pathologically enlarged mediastinal or hilar lymph nodes. Esophagus is unremarkable. Lungs/pleura: There are patchy areas of ground-glass opacity noted within the lower lungs, greater on the right. Dependent atelectasis noted bilaterally. Chronic interstitial opacities are seen in the background. Emphysema noted. There is diffuse bronchial wall thickening. Narrowing of the mainstem bronchi is noted, which is likely secondary to expiratory imaging. Soft Tissues/Bones: No acute bony abnormalities are identified. Visualized extra thoracic soft tissues are unremarkable. Abdomen/Pelvis: Evaluation of the solid and hollow abdominal viscera is limited without intravenous contrast. Organs: That said, no acute hepatic abnormality identified. Gallstone is noted within the gallbladder fundus measuring up to 1.9 cm. No other CT evidence of cholecystitis is seen. Noncontrast imaging of the spleen, adrenals, and pancreas is unremarkable. No acute renal abnormalities are identified. No hydronephrosis or hydroureter. GI/Bowel: Moderate diverticulosis of the large bowel is present without CT evidence of diverticulitis. The large bowel is otherwise unremarkable in appearance. The appendix is not well visualized but no asymmetric pericecal inflammation is seen. Distal esophagus, stomach, duodenal sweep, and the remainder of the small bowel are unremarkable in appearance. No evidence of bowel obstruction. Pelvis: Urinary bladder unremarkable. No free pelvic fluid. Peritoneum/Retroperitoneum: No retroperitoneal lymphadenopathy identified. The abdominal aorta is at the upper limits of normal at 2.8 cm. Bones/Soft Tissues: The extra-abdominal and extra pelvic soft tissues are unremarkable. No acute or suspicious bony abnormality.      CHEST CT: Patchy areas of ground-glass opacity within the lower lungs, greater on the right, overall mild in degree. While nonspecific, the findings are concerning for multifocal pneumonia, especially viral pneumonia. Emphysema. Bronchial wall thickening, which may be seen in inflammatory conditions such as bronchitis, reactive airways disease, and smoking. ABDOMEN AND PELVIS CT: No acute abnormality identified. Uncomplicated cholelithiasis. Moderate diverticulosis of the large bowel, but without CT evidence of diverticulitis.          Discharge Exam:  BP (!) 157/79   Pulse 84   Temp 98.1 °F (36.7 °C) (Oral)   Resp 18   Ht 4' 11\" (1.499 m)   Wt 224 lb 11.2 oz (101.9 kg)   SpO2 93%   BMI 45.38 kg/m²   General appearance: alert, appears stated age and cooperative  Head: Normocephalic, without obvious abnormality, atraumatic  Lungs: clear to auscultation bilaterally  Heart: regular rate and rhythm, S1, S2 normal, no murmur, click, rub or gallop  Abdomen: soft, non-tender; bowel sounds normal; no masses,  no organomegaly  Extremities: extremities normal, atraumatic, no cyanosis or edema    Disposition: home    Condition: stable    Discharge Medications:   Garcia Rivera   [facility] Medication Instructions ZLO:[id number]    Printed on:01/08/21 0905   Medication Information                      albuterol sulfate HFA (PROVENTIL HFA) 108 (90 Base) MCG/ACT inhaler  Inhale 2 puffs into the lungs every 6 hours as needed for Wheezing             budesonide-formoterol (SYMBICORT) 160-4.5 MCG/ACT AERO  Inhale 2 puffs into the lungs 2 times daily             cephALEXin (KEFLEX) 500 MG capsule  Take 1 capsule by mouth 3 times daily for 10 days             dexamethasone (DECADRON) 2 MG tablet  Take 1 tablet by mouth 2 times daily (with meals) for 7 days             digoxin (LANOXIN) 125 MCG tablet  Take 125 mcg by mouth daily             donepezil (ARICEPT) 5 MG tablet  Take 5 mg by mouth nightly             furosemide (LASIX) 40 MG tablet  Take 40 mg by mouth daily gabapentin (NEURONTIN) 100 MG capsule  Take 100 mg by mouth 3 times daily. loratadine (CLARITIN) 10 MG tablet  Take 10 mg by mouth daily             potassium chloride (KLOR-CON) 20 MEQ packet  Take 20 mEq by mouth daily             spironolactone (ALDACTONE) 25 MG tablet  Take 0.5 tablets by mouth daily             traZODone (DESYREL) 50 MG tablet  Take 50 mg by mouth nightly             venlafaxine (EFFEXOR) 75 MG tablet  Take by mouth daily 2 tabs po daily                 Allergies: Allergies   Allergen Reactions    Pcn [Penicillins]        Follow up Instructions: Follow-up with PCP: Efren Pan DO in 2 wk .       Total time spent on day of discharge including face-to-face visit, examination, documentation, counseling, preparation of discharge plans and followup, and discharge medicine reconciliation and presciptions is 37 minutes    Signed:  Ruddy Saba MD  1/8/2021

## 2021-01-08 NOTE — PLAN OF CARE
Problem: Falls - Risk of:  Goal: Will remain free from falls  Description: Will remain free from falls  Outcome: Completed  Goal: Absence of physical injury  Description: Absence of physical injury  Outcome: Completed     Problem: Skin Integrity:  Goal: Will show no infection signs and symptoms  Description: Will show no infection signs and symptoms  Outcome: Completed  Goal: Absence of new skin breakdown  Description: Absence of new skin breakdown  Outcome: Completed     Problem: Airway Clearance - Ineffective  Goal: Achieve or maintain patent airway  Outcome: Completed     Problem: Gas Exchange - Impaired  Goal: Absence of hypoxia  Outcome: Completed  Goal: Promote optimal lung function  Outcome: Completed     Problem: Breathing Pattern - Ineffective  Goal: Ability to achieve and maintain a regular respiratory rate  Outcome: Completed     Problem:  Body Temperature -  Risk of, Imbalanced  Goal: Ability to maintain a body temperature within defined limits  Outcome: Completed  Goal: Will regain or maintain usual level of consciousness  Outcome: Completed  Goal: Complications related to the disease process, condition or treatment will be avoided or minimized  Outcome: Completed     Problem: Isolation Precautions - Risk of Spread of Infection  Goal: Prevent transmission of infection  Outcome: Completed     Problem: Nutrition Deficits  Goal: Optimize nutrtional status  Outcome: Completed     Problem: Risk for Fluid Volume Deficit  Goal: Maintain normal heart rhythm  Outcome: Completed  Goal: Maintain absence of muscle cramping  Outcome: Completed  Goal: Maintain normal serum potassium, sodium, calcium, phosphorus, and pH  Outcome: Completed     Problem: Loneliness or Risk for Loneliness  Goal: Demonstrate positive use of time alone when socialization is not possible  Outcome: Completed     Problem: Fatigue  Goal: Verbalize increase energy and improved vitality  Outcome: Completed     Problem: Patient Education: Go to Patient Education Activity  Goal: Patient/Family Education  Outcome: Completed     Problem: Musculor/Skeletal Functional Status  Goal: Highest potential functional level  Outcome: Completed  Goal: Absence of falls  Outcome: Completed

## 2021-01-08 NOTE — DISCHARGE INSTR - COC
Continuity of Care Form    Patient Name: Donny Ashby   :  1931  MRN:  [id number]    Admit date:  1/3/2021  Discharge date:  21      Code Status Order: Full Code   Advance Directives:   [facility] Documentation       Date/Time Healthcare Directive Type of Healthcare Directive Copy in [address] Agent's Name Healthcare Agent's Phone Number    [phone]  Yes, patient has an advance directive for healthcare treatment  Durable power of  for health care  No, copy requested from family  Healthcare power of   --  --            Admitting Physician:  Desire Louis MD  PCP: Lisa Corral DO    Discharging Nurse: [facility] Unit/Room#: 5MU-8681/6486-18  Discharging Unit Phone Number: [phone]    Emergency Contact:   Extended Emergency Contact Information  Primary Emergency Contact: [facility] Phone: [phone]  Work Phone: [phone]  Relation: Child    Past Surgical History:  Past Surgical History:   Procedure Laterality Date    HYSTERECTOMY         Immunization History:   Immunization History   Administered Date(s) Administered    Pneumococcal Conjugate 13-valent ([place]) 2018       Active Problems:  Patient Active Problem List   Diagnosis Code    Acute diastolic heart failure (HCC) I50.31    HTN (hypertension) I10    Hyponatremia E87.1    PRABHA (acute kidney injury) ([facility] [address] N17.9    Hypokalemia E87.6    UTI (urinary tract infection) N39.0    Suspected COVID-19 virus infection Z20.822    Acute respiratory failure due to COVID-19 ([facility] [address] U07.1, J96.00    COVID-19 U07.1       Isolation/Infection:   Isolation            Droplet Plus          Patient Infection Status       Infection Onset Added Last Indicated Last Indicated By Review Planned Expiration Resolved Resolved By    COVID-19 21 COVID-19 21      Resolved    COVID-19 Rule Out 21 01/03/21 01/03/21 COVID-19 (Ordered)   01/04/21 Rule-Out Test Resulted            Nurse Assessment:  Last Vital Signs: BP (!) 157/79   Pulse 84   Temp 98.1 °F (36.7 °C) (Oral)   Resp 18   Ht 4' 11\" (1.499 m)   Wt 224 lb 11.2 oz (101.9 kg)   SpO2 93%   BMI 45.38 kg/m²     Last documented pain score (0-10 scale): Pain Level: 0  Last Weight:   Wt Readings from Last 1 Encounters:   01/08/21 224 lb 11.2 oz (101.9 kg)     Mental Status:  oriented and alert    IV Access:  - None    Nursing Mobility/ADLs:  Walking   Assisted  Transfer  Assisted  Bathing  Assisted  Dressing  Assisted  Toileting  Assisted  Feeding  Independent  Med Admin  Assisted  Med Delivery   whole    Wound Care Documentation and Therapy:  Wound 01/07/21 Coccyx Left; Inner (Active)   Wound Etiology Pressure Stage  1 01/07/21 0926   Dressing Status Clean;Dry; Intact 01/07/21 2058   Wound Cleansed Soap and water 01/07/21 0926   Dressing/Treatment Foam;Zinc paste 01/07/21 2058   Dressing Change Due 01/10/21 01/07/21 2058   Wound Assessment Dry;Pink/red 01/07/21 2058   Drainage Amount None 01/07/21 2058   Odor Mild 01/07/21 2058   Val-wound Assessment Non-blanchable erythema; Intact 01/07/21 2058   Margins Undefined edges 01/07/21 2058   Number of days: 0       Wound 01/07/21 Perineum Inner (Active)   Wound Etiology Pressure Stage  1 01/07/21 0926   Dressing Status Clean;Dry; Intact 01/07/21 2058   Wound Cleansed Soap and water 01/07/21 0926   Dressing/Treatment Foam;Zinc paste 01/07/21 2058   Dressing Change Due 01/10/21 01/07/21 2058   Wound Assessment Dry;Pink/red 01/07/21 2058   Drainage Amount None 01/07/21 2058   Odor Mild 01/07/21 2058   Val-wound Assessment Non-blanchable erythema;Fragile 01/07/21 2058   Number of days: 0       Wound 01/07/21 Vagina Inner;Left (Active)   Wound Etiology Pressure Stage  1 01/07/21 0926   Wound Cleansed Soap and water 01/07/21 0926   Dressing/Treatment Zinc paste 01/07/21 7999   Wound Assessment Pink/red 01/07/21 2058   Drainage Amount None 01/07/21 2058   Odor Mild 01/07/21 2058   Val-wound Assessment Blanchable erythema;Fragile 01/07/21 2058   Number of days: 0        Elimination:  Continence:   · Bowel: no  · Bladder: no  Urinary Catheter: None   Colostomy/Ileostomy/Ileal Conduit: No       Date of Last BM: 1-7-2020    Intake/Output Summary (Last 24 hours) at 1/8/2021 0907  Last data filed at 1/8/2021 0646  Gross per 24 hour   Intake 240 ml   Output 1000 ml   Net -760 ml     I/O last 3 completed shifts: In: 240 [P.O.:240]  Out: 1000 [Urine:1000]    Safety Concerns: At Risk for Falls    Impairments/Disabilities:      None    Nutrition Therapy:  Current Nutrition Therapy:   - Oral Diet:  General    Routes of Feeding: Oral  Liquids: Thin Liquids  Daily Fluid Restriction: no  Last Modified Barium Swallow with Video (Video Swallowing Test): not done    Treatments at the Time of Hospital Discharge:   Respiratory Treatments: PRN  Oxygen Therapy:  is not on home oxygen therapy.   Ventilator:    - No ventilator support    Rehab Therapies: Skilled Nursing, Physical Therapy and Occupational Therapy  Weight Bearing Status/Restrictions: No weight bearing restirctions  Other Medical Equipment (for information only, NOT a DME order):  walker  Other Treatments: HOME HEALTH CARE: LEVEL 3 SAFETY        -Initial home health evaluation to occur within 24-48 hours, in patient home    -Home health agency to establish plan of care for patient over 60 day period    -Medication Reconciliation    -PT/OT/Speech evaluations in home within 24-48 hours of discharge; including  -DME and home safety    -Frontload therapy 5 days, then 3x a week    -OT to evaluate if patient has [address] needs for personal care    - evaluation within 24-48 hours, includes evaluation of resources   and insurance to determine [place], [place], LTC, and Medicaid options    -PCP Visit scheduled within three to seven days of discharge    -Telehealth-Homecare Vitals(If patient is agreeable and meets guidelines)        Patient's personal belongings (please select all that are sent with patient):  None    RN SIGNATURE:  Electronically signed by Iven Barthel, RN on 1/8/21 at 10:23 AM EST    CASE MANAGEMENT/SOCIAL WORK SECTION    Inpatient Status Date: ***    Readmission Risk Assessment Score:  Readmission Risk              Risk of Unplanned Readmission:        16           Discharging to Facility/ Agency   · Name:   · Address:  · Phone:  · Fax:    Dialysis Facility (if applicable)   · Name:  · Address:  · Dialysis Schedule:  · Phone:  · Fax:    / signature: {Esignature:[id number]:::0}    PHYSICIAN SECTION    Prognosis: Fair    Condition at Discharge: Stable    Rehab Potential (if transferring to Rehab): Good    Recommended Labs or Other Treatments After Discharge: ***    Physician Certification: I certify the above information and transfer of Dina Escalante  is necessary for the continuing treatment of the diagnosis listed and that she requires Home Care for greater 30 days.      Update Admission H&P: No change in H&P    PHYSICIAN SIGNATURE:  Electronically signed by Ana Alvarez MD on 1/8/21 at 9:07 AM EST

## 2021-01-09 ENCOUNTER — CARE COORDINATION (OUTPATIENT)
Dept: CASE MANAGEMENT | Age: 86
End: 2021-01-09

## 2021-01-09 NOTE — CARE COORDINATION
COVID-19 Monitoring Call: Attempted to reach patient via phone for initial post hospital transition call. VM left stating purpose of call along with my contact information requesting a return call. CTN contacted [facility] and spoke with Bindu Burgess who verified [facility] orders were received and they will reach out today.       Ketan CONTRERASN, RN, [facility]  Care Transition Nurse   [phone] office  [phone] mobile

## 2021-01-11 ENCOUNTER — CARE COORDINATION (OUTPATIENT)
Dept: CASE MANAGEMENT | Age: 86
End: 2021-01-11

## 2021-01-11 NOTE — CARE COORDINATION
[facility] Follow Up Call:  CTN attempted outreach to patient's home number, no answer, no VM. CTN reached daughter \"Vanesa\", she does not live with patient but is in contact with her son and DIL that live with patient. Grandson reported that patient has been febrile, giving Tylenol, temp is 99 currently. She has also been having diarrhea, denies cough, does have some SOB and is wearing O2 PRN. CTN encouraged daughter to contact PCP today about diarrhea and fever, she verbalized that she will do so when she calls to schedule follow up. She will call today. Daughter does not know whether or not patient has edema or if she is doing daily weights for CHF. She was also unable to go through a medication review. CT[facility] explained [facility] medications with patient and family. CTN contacted \"Radha\" at [facility] and provided them with daughter's contact number for scheduling. They reported that they had not been able to reach patient to schedule visit, they will try again today. CTN will continue with outreach follow up calls. Call within 2 business days of discharge: Yes    Patient: Adrien Short Patient : 1931   MRN: [id number]  Reason for Admission: COVID+, UTI, respiratory failure  Discharge Date: 21 RARS: Readmission Risk Score: 16      Last Discharge [facility]       Complaint Diagnosis Description Type Department Provider    1/3/21 Fatigue Acute cystitis without hematuria . .. ED to Hosp-Admission (Discharged) (ADMITTED) [facility] Javier Rodriugez MD; Eda Esteves. .. Spoke with: daughter \"Vanesa\"      Challenges to be reviewed by the provider   Additional needs identified to be addressed with provider No  none    Discussed COVID-19 related testing which was available at this time. Test results were positive. Patient informed of results, if available?  Yes         Method of communication with provider : none    Advance Care Planning:   Does patient have an Advance Directive:  not on file. Was this a readmission? No  Patient stated reason for admission: COVID+, UTI  Patients top risk factors for readmission: medical condition    Care Transition Nurse (CTN) contacted the family by telephone to perform post hospital discharge assessment. Verified name and  with family as identifiers. Provided introduction to self, and explanation of the CTN role. CTN reviewed discharge instructions, medical action plan and red flags with family who verbalized understanding. Family given an opportunity to ask questions and does not have any further questions or concerns at this time. Were discharge instructions available to patient? Yes. Reviewed appropriate site of care based on symptoms and resources available to patient including: PCP, Urgent care clinics, Home health and When to call 911. The family agrees to contact the PCP office for questions related to their healthcare. Medication reconciliation was performed with family, who verbalizes understanding of administration of home medications. Advised obtaining a 90-day supply of all daily and as-needed medications. Covid Risk Education    Patient has following risk factors of: heart failure. Education provided regarding infection prevention, and signs and symptoms of COVID-19 and when to seek medical attention with family who verbalized understanding. Discussed exposure protocols and quarantine From CDC: Are you at higher risk for severe illness?   and given an opportunity for questions and concerns. The family agrees to contact the COVID-19 hotline [phone] or PCP office for questions related to COVID-19. For more information on steps you can take to protect yourself, see CDC's How to Protect Yourself     Patient/family/caregiver given information for [facility] Russ and agrees to enroll no      Discussed follow-up appointments.  If no appointment was previously scheduled, appointment scheduling offered: Yes. Is follow up appointment scheduled within 7 days of discharge? No  Non-[facility] follow up appointment(s):     Plan for follow-up call in 5-7 days based on severity of symptoms and risk factors. Plan for next call: symptom management-., self management-. and follow up appointment-. CTN provided contact information for future needs. Non-face-to-face services provided:  Obtained and reviewed discharge summary and/or continuity of care documents  Communication with home health agencies or other community services the patient is currently [facility]    Care Transitions 24 Hour Call    Do you have any ongoing symptoms?: No  Do you have a copy of your discharge instructions?: Yes  Do you have all of your prescriptions and are they filled?: Yes  Have you been contacted by a [address]?: No  Have you scheduled your follow up appointment?: No  Were you discharged with any Home Care or Post Acute Services: Yes  Post Acute Services: Home Health  Do you feel like you have everything you need to keep you well at home?: Yes  Care Transitions Interventions         Follow Up  No future appointments.     Dudley Strong, RN

## 2021-01-18 ENCOUNTER — CARE COORDINATION (OUTPATIENT)
Dept: CASE MANAGEMENT | Age: 86
End: 2021-01-18

## 2021-01-18 NOTE — CARE COORDINATION
CTN called contact number, \"Cinthia\" grandson's wife answered the phone, they live with her and care for her. Patient does not have \"Cinthia\" listed on HIPAA form and she is very [place]. \"Cinthia\" provided a lot of information about patient, CTN did not disclose any PHI to \"Cinthia\". CTN explained HIPAA and that she would need to be added to patient's HIPAA form, she verbalized understanding. CTN directed her to PCP for any questions or concerns and will resolve episode.

## 2021-02-04 PROBLEM — N39.0 UTI (URINARY TRACT INFECTION): Status: RESOLVED | Noted: 2021-01-03 | Resolved: 2021-02-04

## 2022-01-01 ENCOUNTER — CARE COORDINATION (OUTPATIENT)
Dept: CASE MANAGEMENT | Age: 87
End: 2022-01-01

## 2022-01-01 ENCOUNTER — HOSPITAL ENCOUNTER (EMERGENCY)
Age: 87
Discharge: ANOTHER HOSPITAL | End: 2022-02-15
Attending: EMERGENCY MEDICINE
Payer: MEDICARE

## 2022-01-01 ENCOUNTER — APPOINTMENT (OUTPATIENT)
Dept: GENERAL RADIOLOGY | Age: 87
End: 2022-01-01
Payer: MEDICARE

## 2022-01-01 ENCOUNTER — APPOINTMENT (OUTPATIENT)
Dept: GENERAL RADIOLOGY | Age: 87
DRG: 871 | End: 2022-01-01
Payer: MEDICARE

## 2022-01-01 ENCOUNTER — APPOINTMENT (OUTPATIENT)
Dept: GENERAL RADIOLOGY | Age: 87
DRG: 177 | End: 2022-01-01
Payer: MEDICARE

## 2022-01-01 ENCOUNTER — HOSPITAL ENCOUNTER (INPATIENT)
Age: 87
LOS: 1 days | DRG: 871 | End: 2022-07-09
Attending: EMERGENCY MEDICINE | Admitting: INTERNAL MEDICINE
Payer: MEDICARE

## 2022-01-01 ENCOUNTER — APPOINTMENT (OUTPATIENT)
Dept: CT IMAGING | Age: 87
DRG: 871 | End: 2022-01-01
Payer: MEDICARE

## 2022-01-01 ENCOUNTER — HOSPITAL ENCOUNTER (INPATIENT)
Age: 87
LOS: 3 days | Discharge: HOME | DRG: 177 | End: 2022-02-02
Attending: INTERNAL MEDICINE | Admitting: INTERNAL MEDICINE
Payer: MEDICARE

## 2022-01-01 VITALS
HEART RATE: 101 BPM | DIASTOLIC BLOOD PRESSURE: 40 MMHG | OXYGEN SATURATION: 94 % | RESPIRATION RATE: 14 BRPM | SYSTOLIC BLOOD PRESSURE: 110 MMHG | TEMPERATURE: 98.5 F

## 2022-01-01 VITALS
SYSTOLIC BLOOD PRESSURE: 123 MMHG | TEMPERATURE: 98.1 F | HEART RATE: 102 BPM | RESPIRATION RATE: 18 BRPM | OXYGEN SATURATION: 93 % | HEIGHT: 59 IN | DIASTOLIC BLOOD PRESSURE: 64 MMHG | WEIGHT: 201.3 LBS | BODY MASS INDEX: 40.58 KG/M2

## 2022-01-01 VITALS
DIASTOLIC BLOOD PRESSURE: 52 MMHG | OXYGEN SATURATION: 63 % | RESPIRATION RATE: 19 BRPM | SYSTOLIC BLOOD PRESSURE: 104 MMHG | BODY MASS INDEX: 40.97 KG/M2 | HEART RATE: 108 BPM | TEMPERATURE: 98.9 F | WEIGHT: 202.82 LBS

## 2022-01-01 DIAGNOSIS — Z20.822 SUSPECTED COVID-19 VIRUS INFECTION: Primary | ICD-10-CM

## 2022-01-01 DIAGNOSIS — A41.9 SEPTIC SHOCK (HCC): Primary | ICD-10-CM

## 2022-01-01 DIAGNOSIS — G93.40 ACUTE ENCEPHALOPATHY: ICD-10-CM

## 2022-01-01 DIAGNOSIS — R65.21 SEPTIC SHOCK (HCC): Primary | ICD-10-CM

## 2022-01-01 DIAGNOSIS — R21 RASH: ICD-10-CM

## 2022-01-01 DIAGNOSIS — J96.01 ACUTE RESPIRATORY FAILURE WITH HYPOXIA (HCC): ICD-10-CM

## 2022-01-01 DIAGNOSIS — N17.9 AKI (ACUTE KIDNEY INJURY) (HCC): ICD-10-CM

## 2022-01-01 DIAGNOSIS — N30.00 ACUTE CYSTITIS WITHOUT HEMATURIA: ICD-10-CM

## 2022-01-01 DIAGNOSIS — A41.9 SEPTICEMIA (HCC): Primary | ICD-10-CM

## 2022-01-01 LAB
A/G RATIO: 0.5 (ref 1.1–2.2)
A/G RATIO: 0.6 (ref 1.1–2.2)
A/G RATIO: 0.7 (ref 1.1–2.2)
A/G RATIO: 0.8 (ref 1.1–2.2)
ALBUMIN SERPL-MCNC: 2.2 G/DL (ref 3.4–5)
ALBUMIN SERPL-MCNC: 2.5 G/DL (ref 3.4–5)
ALBUMIN SERPL-MCNC: 2.6 G/DL (ref 3.4–5)
ALBUMIN SERPL-MCNC: 3 G/DL (ref 3.4–5)
ALP BLD-CCNC: 126 U/L (ref 40–129)
ALP BLD-CCNC: 151 U/L (ref 40–129)
ALP BLD-CCNC: 192 U/L (ref 40–129)
ALP BLD-CCNC: 267 U/L (ref 40–129)
ALT SERPL-CCNC: 20 U/L (ref 10–40)
ALT SERPL-CCNC: 25 U/L (ref 10–40)
ALT SERPL-CCNC: 6 U/L (ref 10–40)
ALT SERPL-CCNC: 8 U/L (ref 10–40)
ANION GAP SERPL CALCULATED.3IONS-SCNC: 10 MMOL/L (ref 3–16)
ANION GAP SERPL CALCULATED.3IONS-SCNC: 10 MMOL/L (ref 3–16)
ANION GAP SERPL CALCULATED.3IONS-SCNC: 16 MMOL/L (ref 3–16)
ANION GAP SERPL CALCULATED.3IONS-SCNC: 16 MMOL/L (ref 3–16)
ANION GAP SERPL CALCULATED.3IONS-SCNC: 20 MMOL/L (ref 3–16)
ANION GAP SERPL CALCULATED.3IONS-SCNC: 7 MMOL/L (ref 3–16)
ANION GAP SERPL CALCULATED.3IONS-SCNC: 9 MMOL/L (ref 3–16)
ANISOCYTOSIS: ABNORMAL
ANISOCYTOSIS: ABNORMAL
AST SERPL-CCNC: 12 U/L (ref 15–37)
AST SERPL-CCNC: 15 U/L (ref 15–37)
AST SERPL-CCNC: 17 U/L (ref 15–37)
AST SERPL-CCNC: 32 U/L (ref 15–37)
ATYPICAL LYMPHOCYTE RELATIVE PERCENT: 1 % (ref 0–6)
BACTERIA: ABNORMAL /HPF
BACTERIA: ABNORMAL /HPF
BANDED NEUTROPHILS RELATIVE PERCENT: 16 % (ref 0–7)
BANDED NEUTROPHILS RELATIVE PERCENT: 4 % (ref 0–7)
BANDED NEUTROPHILS RELATIVE PERCENT: 5 % (ref 0–7)
BASE EXCESS VENOUS: -0.3 MMOL/L (ref -3–3)
BASE EXCESS VENOUS: 5.3 MMOL/L (ref -3–3)
BASOPHILS ABSOLUTE: 0 K/UL (ref 0–0.2)
BASOPHILS ABSOLUTE: 0.1 K/UL (ref 0–0.2)
BASOPHILS RELATIVE PERCENT: 0 %
BASOPHILS RELATIVE PERCENT: 0.1 %
BASOPHILS RELATIVE PERCENT: 0.1 %
BASOPHILS RELATIVE PERCENT: 0.4 %
BASOPHILS RELATIVE PERCENT: 0.4 %
BILIRUB SERPL-MCNC: 0.3 MG/DL (ref 0–1)
BILIRUB SERPL-MCNC: 0.9 MG/DL (ref 0–1)
BILIRUB SERPL-MCNC: 1.7 MG/DL (ref 0–1)
BILIRUB SERPL-MCNC: <0.2 MG/DL (ref 0–1)
BILIRUBIN URINE: ABNORMAL
BILIRUBIN URINE: ABNORMAL
BILIRUBIN URINE: NEGATIVE
BLOOD CULTURE, ROUTINE: NORMAL
BLOOD, URINE: ABNORMAL
BLOOD, URINE: NEGATIVE
BLOOD, URINE: NEGATIVE
BUN BLDV-MCNC: 20 MG/DL (ref 7–20)
BUN BLDV-MCNC: 23 MG/DL (ref 7–20)
BUN BLDV-MCNC: 26 MG/DL (ref 7–20)
BUN BLDV-MCNC: 29 MG/DL (ref 7–20)
BUN BLDV-MCNC: 31 MG/DL (ref 7–20)
BUN BLDV-MCNC: 74 MG/DL (ref 7–20)
BUN BLDV-MCNC: 82 MG/DL (ref 7–20)
C DIFF TOXIN/ANTIGEN: NORMAL
C-REACTIVE PROTEIN: 20.7 MG/L (ref 0–5.1)
C-REACTIVE PROTEIN: 37.4 MG/L (ref 0–5.1)
C-REACTIVE PROTEIN: 382.9 MG/L (ref 0–5.1)
C-REACTIVE PROTEIN: 40 MG/L (ref 0–5.1)
C-REACTIVE PROTEIN: 8.1 MG/L (ref 0–5.1)
CALCIUM SERPL-MCNC: 8.1 MG/DL (ref 8.3–10.6)
CALCIUM SERPL-MCNC: 8.1 MG/DL (ref 8.3–10.6)
CALCIUM SERPL-MCNC: 8.3 MG/DL (ref 8.3–10.6)
CALCIUM SERPL-MCNC: 8.3 MG/DL (ref 8.3–10.6)
CALCIUM SERPL-MCNC: 8.8 MG/DL (ref 8.3–10.6)
CALCIUM SERPL-MCNC: 9 MG/DL (ref 8.3–10.6)
CALCIUM SERPL-MCNC: 9.3 MG/DL (ref 8.3–10.6)
CARBOXYHEMOGLOBIN: 2.5 % (ref 0–1.5)
CARBOXYHEMOGLOBIN: 3.1 % (ref 0–1.5)
CHLORIDE BLD-SCNC: 103 MMOL/L (ref 99–110)
CHLORIDE BLD-SCNC: 103 MMOL/L (ref 99–110)
CHLORIDE BLD-SCNC: 104 MMOL/L (ref 99–110)
CHLORIDE BLD-SCNC: 92 MMOL/L (ref 99–110)
CHLORIDE BLD-SCNC: 99 MMOL/L (ref 99–110)
CLARITY: ABNORMAL
CLARITY: ABNORMAL
CLARITY: CLEAR
CO2: 13 MMOL/L (ref 21–32)
CO2: 24 MMOL/L (ref 21–32)
CO2: 24 MMOL/L (ref 21–32)
CO2: 25 MMOL/L (ref 21–32)
CO2: 26 MMOL/L (ref 21–32)
CO2: 26 MMOL/L (ref 21–32)
CO2: 32 MMOL/L (ref 21–32)
COLOR: ABNORMAL
COLOR: ABNORMAL
COLOR: YELLOW
COMMENT UA: ABNORMAL
CREAT SERPL-MCNC: 0.9 MG/DL (ref 0.6–1.2)
CREAT SERPL-MCNC: 0.9 MG/DL (ref 0.6–1.2)
CREAT SERPL-MCNC: 1.1 MG/DL (ref 0.6–1.2)
CREAT SERPL-MCNC: 1.2 MG/DL (ref 0.6–1.2)
CREAT SERPL-MCNC: 1.4 MG/DL (ref 0.6–1.2)
CREAT SERPL-MCNC: 2.4 MG/DL (ref 0.6–1.2)
CREAT SERPL-MCNC: 2.5 MG/DL (ref 0.6–1.2)
CULTURE, BLOOD 2: NORMAL
D DIMER: 820 NG/ML DDU (ref 0–229)
DIGOXIN LEVEL: <0.3 NG/ML (ref 0.8–2)
EKG ATRIAL RATE: 102 BPM
EKG ATRIAL RATE: 125 BPM
EKG ATRIAL RATE: 97 BPM
EKG DIAGNOSIS: NORMAL
EKG P AXIS: 109 DEGREES
EKG P AXIS: 55 DEGREES
EKG P AXIS: 66 DEGREES
EKG P-R INTERVAL: 132 MS
EKG P-R INTERVAL: 156 MS
EKG P-R INTERVAL: 160 MS
EKG Q-T INTERVAL: 288 MS
EKG Q-T INTERVAL: 338 MS
EKG Q-T INTERVAL: 348 MS
EKG QRS DURATION: 76 MS
EKG QRS DURATION: 78 MS
EKG QRS DURATION: 78 MS
EKG QTC CALCULATION (BAZETT): 415 MS
EKG QTC CALCULATION (BAZETT): 429 MS
EKG QTC CALCULATION (BAZETT): 453 MS
EKG R AXIS: 1 DEGREES
EKG R AXIS: 19 DEGREES
EKG R AXIS: 25 DEGREES
EKG T AXIS: 12 DEGREES
EKG T AXIS: 120 DEGREES
EKG T AXIS: 267 DEGREES
EKG VENTRICULAR RATE: 102 BPM
EKG VENTRICULAR RATE: 125 BPM
EKG VENTRICULAR RATE: 97 BPM
EOSINOPHILS ABSOLUTE: 0 K/UL (ref 0–0.6)
EOSINOPHILS ABSOLUTE: 0.1 K/UL (ref 0–0.6)
EOSINOPHILS ABSOLUTE: 0.2 K/UL (ref 0–0.6)
EOSINOPHILS RELATIVE PERCENT: 0 %
EOSINOPHILS RELATIVE PERCENT: 0.6 %
EOSINOPHILS RELATIVE PERCENT: 1 %
EPITHELIAL CELLS, UA: 0 /HPF (ref 0–5)
EPITHELIAL CELLS, UA: 1 /HPF (ref 0–5)
EPITHELIAL CELLS, UA: 64 /HPF (ref 0–5)
GFR AFRICAN AMERICAN: 22
GFR AFRICAN AMERICAN: 23
GFR AFRICAN AMERICAN: 43
GFR AFRICAN AMERICAN: 51
GFR AFRICAN AMERICAN: 56
GFR AFRICAN AMERICAN: >60
GFR AFRICAN AMERICAN: >60
GFR NON-AFRICAN AMERICAN: 18
GFR NON-AFRICAN AMERICAN: 19
GFR NON-AFRICAN AMERICAN: 35
GFR NON-AFRICAN AMERICAN: 42
GFR NON-AFRICAN AMERICAN: 47
GFR NON-AFRICAN AMERICAN: 59
GFR NON-AFRICAN AMERICAN: 59
GLUCOSE BLD-MCNC: 121 MG/DL (ref 70–99)
GLUCOSE BLD-MCNC: 124 MG/DL (ref 70–99)
GLUCOSE BLD-MCNC: 129 MG/DL (ref 70–99)
GLUCOSE BLD-MCNC: 131 MG/DL (ref 70–99)
GLUCOSE BLD-MCNC: 135 MG/DL (ref 70–99)
GLUCOSE BLD-MCNC: 147 MG/DL (ref 70–99)
GLUCOSE BLD-MCNC: 181 MG/DL (ref 70–99)
GLUCOSE URINE: 100 MG/DL
GLUCOSE URINE: NEGATIVE MG/DL
GLUCOSE URINE: NEGATIVE MG/DL
HCO3 VENOUS: 27.5 MMOL/L (ref 23–29)
HCO3 VENOUS: 34 MMOL/L (ref 23–29)
HCT VFR BLD CALC: 36.8 % (ref 36–48)
HCT VFR BLD CALC: 38.7 % (ref 36–48)
HCT VFR BLD CALC: 41.9 % (ref 36–48)
HCT VFR BLD CALC: 42.3 % (ref 36–48)
HCT VFR BLD CALC: 45.3 % (ref 36–48)
HCT VFR BLD CALC: 45.5 % (ref 36–48)
HCT VFR BLD CALC: 45.7 % (ref 36–48)
HEMOGLOBIN, VEN, REDUCED: 10 %
HEMOGLOBIN, VEN, REDUCED: 22 %
HEMOGLOBIN: 12.2 G/DL (ref 12–16)
HEMOGLOBIN: 12.6 G/DL (ref 12–16)
HEMOGLOBIN: 13.6 G/DL (ref 12–16)
HEMOGLOBIN: 13.7 G/DL (ref 12–16)
HEMOGLOBIN: 13.9 G/DL (ref 12–16)
HEMOGLOBIN: 14.6 G/DL (ref 12–16)
HEMOGLOBIN: 14.8 G/DL (ref 12–16)
HYALINE CASTS: 0 /LPF (ref 0–8)
HYALINE CASTS: 10 /LPF (ref 0–8)
HYALINE CASTS: 140 /LPF (ref 0–8)
INFLUENZA A: NOT DETECTED
INFLUENZA B: NOT DETECTED
INR BLD: 1.15 (ref 0.87–1.14)
INR BLD: 1.17 (ref 0.88–1.12)
KETONES, URINE: ABNORMAL MG/DL
KETONES, URINE: NEGATIVE MG/DL
KETONES, URINE: NEGATIVE MG/DL
LACTIC ACID, SEPSIS: 3.9 MMOL/L (ref 0.4–1.9)
LACTIC ACID: 2.7 MMOL/L (ref 0.4–2)
LACTIC ACID: 2.7 MMOL/L (ref 0.4–2)
LACTIC ACID: 4.9 MMOL/L (ref 0.4–2)
LACTIC ACID: 5.1 MMOL/L (ref 0.4–2)
LACTIC ACID: 5.3 MMOL/L (ref 0.4–2)
LEUKOCYTE ESTERASE, URINE: ABNORMAL
LIPASE: 24 U/L (ref 13–60)
LIPASE: 26 U/L (ref 13–60)
LYMPHOCYTES ABSOLUTE: 0.9 K/UL (ref 1–5.1)
LYMPHOCYTES ABSOLUTE: 1 K/UL (ref 1–5.1)
LYMPHOCYTES ABSOLUTE: 1.1 K/UL (ref 1–5.1)
LYMPHOCYTES ABSOLUTE: 1.3 K/UL (ref 1–5.1)
LYMPHOCYTES ABSOLUTE: 1.3 K/UL (ref 1–5.1)
LYMPHOCYTES ABSOLUTE: 2.4 K/UL (ref 1–5.1)
LYMPHOCYTES ABSOLUTE: 3.4 K/UL (ref 1–5.1)
LYMPHOCYTES RELATIVE PERCENT: 13.9 %
LYMPHOCYTES RELATIVE PERCENT: 14 %
LYMPHOCYTES RELATIVE PERCENT: 14.9 %
LYMPHOCYTES RELATIVE PERCENT: 16.2 %
LYMPHOCYTES RELATIVE PERCENT: 5 %
LYMPHOCYTES RELATIVE PERCENT: 8 %
LYMPHOCYTES RELATIVE PERCENT: 9.2 %
MCH RBC QN AUTO: 28.6 PG (ref 26–34)
MCH RBC QN AUTO: 28.9 PG (ref 26–34)
MCH RBC QN AUTO: 30.2 PG (ref 26–34)
MCH RBC QN AUTO: 30.3 PG (ref 26–34)
MCH RBC QN AUTO: 30.5 PG (ref 26–34)
MCH RBC QN AUTO: 30.5 PG (ref 26–34)
MCH RBC QN AUTO: 31 PG (ref 26–34)
MCHC RBC AUTO-ENTMCNC: 30.5 G/DL (ref 31–36)
MCHC RBC AUTO-ENTMCNC: 32.3 G/DL (ref 31–36)
MCHC RBC AUTO-ENTMCNC: 32.4 G/DL (ref 31–36)
MCHC RBC AUTO-ENTMCNC: 32.5 G/DL (ref 31–36)
MCHC RBC AUTO-ENTMCNC: 32.6 G/DL (ref 31–36)
MCHC RBC AUTO-ENTMCNC: 32.7 G/DL (ref 31–36)
MCHC RBC AUTO-ENTMCNC: 33.1 G/DL (ref 31–36)
MCV RBC AUTO: 88.6 FL (ref 80–100)
MCV RBC AUTO: 93.3 FL (ref 80–100)
MCV RBC AUTO: 93.3 FL (ref 80–100)
MCV RBC AUTO: 93.5 FL (ref 80–100)
MCV RBC AUTO: 93.6 FL (ref 80–100)
MCV RBC AUTO: 93.7 FL (ref 80–100)
MCV RBC AUTO: 94.6 FL (ref 80–100)
METAMYELOCYTES RELATIVE PERCENT: 7 %
METHEMOGLOBIN VENOUS: 0.3 %
METHEMOGLOBIN VENOUS: 0.4 %
MICROSCOPIC EXAMINATION: YES
MONOCYTES ABSOLUTE: 0.1 K/UL (ref 0–1.3)
MONOCYTES ABSOLUTE: 0.5 K/UL (ref 0–1.3)
MONOCYTES ABSOLUTE: 0.7 K/UL (ref 0–1.3)
MONOCYTES ABSOLUTE: 0.8 K/UL (ref 0–1.3)
MONOCYTES ABSOLUTE: 1.2 K/UL (ref 0–1.3)
MONOCYTES ABSOLUTE: 1.2 K/UL (ref 0–1.3)
MONOCYTES ABSOLUTE: 1.4 K/UL (ref 0–1.3)
MONOCYTES RELATIVE PERCENT: 2.5 %
MONOCYTES RELATIVE PERCENT: 5 %
MONOCYTES RELATIVE PERCENT: 5 %
MONOCYTES RELATIVE PERCENT: 5.4 %
MONOCYTES RELATIVE PERCENT: 6 %
MONOCYTES RELATIVE PERCENT: 6.9 %
MONOCYTES RELATIVE PERCENT: 7.5 %
MYELOCYTE PERCENT: 1 %
NEUTROPHILS ABSOLUTE: 12 K/UL (ref 1.7–7.7)
NEUTROPHILS ABSOLUTE: 17.9 K/UL (ref 1.7–7.7)
NEUTROPHILS ABSOLUTE: 19.9 K/UL (ref 1.7–7.7)
NEUTROPHILS ABSOLUTE: 23.3 K/UL (ref 1.7–7.7)
NEUTROPHILS ABSOLUTE: 4.4 K/UL (ref 1.7–7.7)
NEUTROPHILS ABSOLUTE: 6 K/UL (ref 1.7–7.7)
NEUTROPHILS ABSOLUTE: 7.1 K/UL (ref 1.7–7.7)
NEUTROPHILS RELATIVE PERCENT: 58 %
NEUTROPHILS RELATIVE PERCENT: 78.1 %
NEUTROPHILS RELATIVE PERCENT: 78.5 %
NEUTROPHILS RELATIVE PERCENT: 80.9 %
NEUTROPHILS RELATIVE PERCENT: 81 %
NEUTROPHILS RELATIVE PERCENT: 83 %
NEUTROPHILS RELATIVE PERCENT: 84.4 %
NITRITE, URINE: NEGATIVE
NUCLEATED RED BLOOD CELLS: 4 /100 WBC
O2 CONTENT, VEN: 15 VOL %
O2 CONTENT, VEN: 19 VOL %
O2 SAT, VEN: 77 %
O2 SAT, VEN: 90 %
O2 THERAPY: ABNORMAL
O2 THERAPY: ABNORMAL
OCCULT BLOOD DIAGNOSTIC: ABNORMAL
ORGANISM: ABNORMAL
PCO2, VEN: 57.2 MMHG (ref 40–50)
PCO2, VEN: 65.9 MMHG (ref 40–50)
PDW BLD-RTO: 14 % (ref 12.4–15.4)
PDW BLD-RTO: 14.1 % (ref 12.4–15.4)
PDW BLD-RTO: 14.2 % (ref 12.4–15.4)
PDW BLD-RTO: 14.3 % (ref 12.4–15.4)
PDW BLD-RTO: 14.4 % (ref 12.4–15.4)
PDW BLD-RTO: 16.3 % (ref 12.4–15.4)
PDW BLD-RTO: 17.8 % (ref 12.4–15.4)
PH UA: 5.5 (ref 5–8)
PH UA: 5.5 (ref 5–8)
PH UA: 7.5 (ref 5–8)
PH VENOUS: 7.29 (ref 7.35–7.45)
PH VENOUS: 7.32 (ref 7.35–7.45)
PLATELET # BLD: 236 K/UL (ref 135–450)
PLATELET # BLD: 254 K/UL (ref 135–450)
PLATELET # BLD: 280 K/UL (ref 135–450)
PLATELET # BLD: 290 K/UL (ref 135–450)
PLATELET # BLD: 305 K/UL (ref 135–450)
PLATELET # BLD: 409 K/UL (ref 135–450)
PLATELET # BLD: 445 K/UL (ref 135–450)
PLATELET SLIDE REVIEW: ABNORMAL
PLATELET SLIDE REVIEW: ADEQUATE
PMV BLD AUTO: 8.6 FL (ref 5–10.5)
PMV BLD AUTO: 8.8 FL (ref 5–10.5)
PMV BLD AUTO: 8.9 FL (ref 5–10.5)
PMV BLD AUTO: 9.3 FL (ref 5–10.5)
PMV BLD AUTO: 9.3 FL (ref 5–10.5)
PMV BLD AUTO: 9.8 FL (ref 5–10.5)
PMV BLD AUTO: 9.9 FL (ref 5–10.5)
PO2, VEN: 45.6 MMHG (ref 25–40)
PO2, VEN: 60.6 MMHG (ref 25–40)
POTASSIUM REFLEX MAGNESIUM: 4.4 MMOL/L (ref 3.5–5.1)
POTASSIUM REFLEX MAGNESIUM: 4.5 MMOL/L (ref 3.5–5.1)
POTASSIUM REFLEX MAGNESIUM: 4.6 MMOL/L (ref 3.5–5.1)
POTASSIUM REFLEX MAGNESIUM: 5.9 MMOL/L (ref 3.5–5.1)
POTASSIUM SERPL-SCNC: 4.3 MMOL/L (ref 3.5–5.1)
POTASSIUM SERPL-SCNC: 4.6 MMOL/L (ref 3.5–5.1)
POTASSIUM SERPL-SCNC: 4.8 MMOL/L (ref 3.5–5.1)
PRO-BNP: 314 PG/ML (ref 0–449)
PRO-BNP: 726 PG/ML (ref 0–449)
PRO-BNP: 7876 PG/ML (ref 0–449)
PROCALCITONIN: 0.05 NG/ML (ref 0–0.15)
PROCALCITONIN: 0.06 NG/ML (ref 0–0.15)
PROCALCITONIN: 0.58 NG/ML (ref 0–0.15)
PROCALCITONIN: 1.16 NG/ML (ref 0–0.15)
PROTEIN UA: 30 MG/DL
PROTEIN UA: 300 MG/DL
PROTEIN UA: NEGATIVE MG/DL
PROTHROMBIN TIME: 13.3 SEC (ref 9.9–12.7)
PROTHROMBIN TIME: 14.7 SEC (ref 11.7–14.5)
RAPID INFLUENZA  B AGN: NEGATIVE
RAPID INFLUENZA A AGN: NEGATIVE
RBC # BLD: 3.93 M/UL (ref 4–5.2)
RBC # BLD: 4.14 M/UL (ref 4–5.2)
RBC # BLD: 4.47 M/UL (ref 4–5.2)
RBC # BLD: 4.53 M/UL (ref 4–5.2)
RBC # BLD: 4.81 M/UL (ref 4–5.2)
RBC # BLD: 4.9 M/UL (ref 4–5.2)
RBC # BLD: 5.11 M/UL (ref 4–5.2)
RBC # BLD: NORMAL 10*6/UL
RBC UA: 11 /HPF (ref 0–4)
RBC UA: 164 /HPF (ref 0–4)
RBC UA: 2 /HPF (ref 0–4)
REPORT: NORMAL
SARS-COV-2 RNA, RT PCR: NOT DETECTED
SARS-COV-2, PCR: DETECTED
SEDIMENTATION RATE, ERYTHROCYTE: 67 MM/HR (ref 0–30)
SLIDE REVIEW: ABNORMAL
SLIDE REVIEW: ABNORMAL
SODIUM BLD-SCNC: 132 MMOL/L (ref 136–145)
SODIUM BLD-SCNC: 134 MMOL/L (ref 136–145)
SODIUM BLD-SCNC: 137 MMOL/L (ref 136–145)
SODIUM BLD-SCNC: 138 MMOL/L (ref 136–145)
SODIUM BLD-SCNC: 138 MMOL/L (ref 136–145)
SODIUM BLD-SCNC: 139 MMOL/L (ref 136–145)
SODIUM BLD-SCNC: 139 MMOL/L (ref 136–145)
SPECIFIC GRAVITY UA: 1.01 (ref 1–1.03)
SPECIFIC GRAVITY UA: 1.02 (ref 1–1.03)
SPECIFIC GRAVITY UA: 1.02 (ref 1–1.03)
TCO2 CALC VENOUS: 66 MMOL/L
TCO2 CALC VENOUS: 81 MMOL/L
TOTAL CK: 34 U/L (ref 26–192)
TOTAL PROTEIN: 5.7 G/DL (ref 6.4–8.2)
TOTAL PROTEIN: 6.8 G/DL (ref 6.4–8.2)
TOTAL PROTEIN: 6.8 G/DL (ref 6.4–8.2)
TOTAL PROTEIN: 7.4 G/DL (ref 6.4–8.2)
TROPONIN: 0.06 NG/ML
TROPONIN: <0.01 NG/ML
TROPONIN: <0.01 NG/ML
URINE CULTURE, ROUTINE: ABNORMAL
URINE REFLEX TO CULTURE: YES
URINE REFLEX TO CULTURE: YES
URINE TYPE: ABNORMAL
UROBILINOGEN, URINE: 1 E.U./DL
UROBILINOGEN, URINE: 1 E.U./DL
UROBILINOGEN, URINE: >=8 E.U./DL
WBC # BLD: 14.3 K/UL (ref 4–11)
WBC # BLD: 20.3 K/UL (ref 4–11)
WBC # BLD: 24.6 K/UL (ref 4–11)
WBC # BLD: 27.1 K/UL (ref 4–11)
WBC # BLD: 5.5 K/UL (ref 4–11)
WBC # BLD: 7.7 K/UL (ref 4–11)
WBC # BLD: 9 K/UL (ref 4–11)
WBC UA: 263 /HPF (ref 0–5)
WBC UA: 31 /HPF (ref 0–5)
WBC UA: 4625 /HPF (ref 0–5)

## 2022-01-01 PROCEDURE — 2000000000 HC ICU R&B

## 2022-01-01 PROCEDURE — 2500000003 HC RX 250 WO HCPCS: Performed by: EMERGENCY MEDICINE

## 2022-01-01 PROCEDURE — 82550 ASSAY OF CK (CPK): CPT

## 2022-01-01 PROCEDURE — 83880 ASSAY OF NATRIURETIC PEPTIDE: CPT

## 2022-01-01 PROCEDURE — 2580000003 HC RX 258: Performed by: EMERGENCY MEDICINE

## 2022-01-01 PROCEDURE — 85025 COMPLETE CBC W/AUTO DIFF WBC: CPT

## 2022-01-01 PROCEDURE — 6370000000 HC RX 637 (ALT 250 FOR IP): Performed by: INTERNAL MEDICINE

## 2022-01-01 PROCEDURE — 80053 COMPREHEN METABOLIC PANEL: CPT

## 2022-01-01 PROCEDURE — 97530 THERAPEUTIC ACTIVITIES: CPT

## 2022-01-01 PROCEDURE — 6360000002 HC RX W HCPCS: Performed by: INTERNAL MEDICINE

## 2022-01-01 PROCEDURE — 1200000000 HC SEMI PRIVATE

## 2022-01-01 PROCEDURE — 80048 BASIC METABOLIC PNL TOTAL CA: CPT

## 2022-01-01 PROCEDURE — 94640 AIRWAY INHALATION TREATMENT: CPT

## 2022-01-01 PROCEDURE — 96365 THER/PROPH/DIAG IV INF INIT: CPT

## 2022-01-01 PROCEDURE — 97162 PT EVAL MOD COMPLEX 30 MIN: CPT

## 2022-01-01 PROCEDURE — 2580000003 HC RX 258: Performed by: INTERNAL MEDICINE

## 2022-01-01 PROCEDURE — 94680 O2 UPTK RST&XERS DIR SIMPLE: CPT

## 2022-01-01 PROCEDURE — 02HV33Z INSERTION OF INFUSION DEVICE INTO SUPERIOR VENA CAVA, PERCUTANEOUS APPROACH: ICD-10-PCS | Performed by: EMERGENCY MEDICINE

## 2022-01-01 PROCEDURE — 96367 TX/PROPH/DG ADDL SEQ IV INF: CPT

## 2022-01-01 PROCEDURE — 90686 IIV4 VACC NO PRSV 0.5 ML IM: CPT | Performed by: INTERNAL MEDICINE

## 2022-01-01 PROCEDURE — 71045 X-RAY EXAM CHEST 1 VIEW: CPT

## 2022-01-01 PROCEDURE — 87804 INFLUENZA ASSAY W/OPTIC: CPT

## 2022-01-01 PROCEDURE — 36415 COLL VENOUS BLD VENIPUNCTURE: CPT

## 2022-01-01 PROCEDURE — 87086 URINE CULTURE/COLONY COUNT: CPT

## 2022-01-01 PROCEDURE — 81001 URINALYSIS AUTO W/SCOPE: CPT

## 2022-01-01 PROCEDURE — 51798 US URINE CAPACITY MEASURE: CPT

## 2022-01-01 PROCEDURE — 99285 EMERGENCY DEPT VISIT HI MDM: CPT

## 2022-01-01 PROCEDURE — 84484 ASSAY OF TROPONIN QUANT: CPT

## 2022-01-01 PROCEDURE — 93005 ELECTROCARDIOGRAM TRACING: CPT | Performed by: EMERGENCY MEDICINE

## 2022-01-01 PROCEDURE — 85610 PROTHROMBIN TIME: CPT

## 2022-01-01 PROCEDURE — 86403 PARTICLE AGGLUT ANTBDY SCRN: CPT

## 2022-01-01 PROCEDURE — 51702 INSERT TEMP BLADDER CATH: CPT

## 2022-01-01 PROCEDURE — 83605 ASSAY OF LACTIC ACID: CPT

## 2022-01-01 PROCEDURE — 94761 N-INVAS EAR/PLS OXIMETRY MLT: CPT

## 2022-01-01 PROCEDURE — 99284 EMERGENCY DEPT VISIT MOD MDM: CPT

## 2022-01-01 PROCEDURE — 84145 PROCALCITONIN (PCT): CPT

## 2022-01-01 PROCEDURE — U0005 INFEC AGEN DETEC AMPLI PROBE: HCPCS

## 2022-01-01 PROCEDURE — 93005 ELECTROCARDIOGRAM TRACING: CPT | Performed by: PHYSICIAN ASSISTANT

## 2022-01-01 PROCEDURE — 83690 ASSAY OF LIPASE: CPT

## 2022-01-01 PROCEDURE — U0003 INFECTIOUS AGENT DETECTION BY NUCLEIC ACID (DNA OR RNA); SEVERE ACUTE RESPIRATORY SYNDROME CORONAVIRUS 2 (SARS-COV-2) (CORONAVIRUS DISEASE [COVID-19]), AMPLIFIED PROBE TECHNIQUE, MAKING USE OF HIGH THROUGHPUT TECHNOLOGIES AS DESCRIBED BY CMS-2020-01-R: HCPCS

## 2022-01-01 PROCEDURE — 87493 C DIFF AMPLIFIED PROBE: CPT

## 2022-01-01 PROCEDURE — 86140 C-REACTIVE PROTEIN: CPT

## 2022-01-01 PROCEDURE — 87150 DNA/RNA AMPLIFIED PROBE: CPT

## 2022-01-01 PROCEDURE — 36556 INSERT NON-TUNNEL CV CATH: CPT

## 2022-01-01 PROCEDURE — XW0DXM6 INTRODUCTION OF BARICITINIB INTO MOUTH AND PHARYNX, EXTERNAL APPROACH, NEW TECHNOLOGY GROUP 6: ICD-10-PCS | Performed by: INTERNAL MEDICINE

## 2022-01-01 PROCEDURE — 97535 SELF CARE MNGMENT TRAINING: CPT

## 2022-01-01 PROCEDURE — 85652 RBC SED RATE AUTOMATED: CPT

## 2022-01-01 PROCEDURE — 2500000003 HC RX 250 WO HCPCS: Performed by: INTERNAL MEDICINE

## 2022-01-01 PROCEDURE — 6360000002 HC RX W HCPCS: Performed by: EMERGENCY MEDICINE

## 2022-01-01 PROCEDURE — 87040 BLOOD CULTURE FOR BACTERIA: CPT

## 2022-01-01 PROCEDURE — 85379 FIBRIN DEGRADATION QUANT: CPT

## 2022-01-01 PROCEDURE — 82270 OCCULT BLOOD FECES: CPT

## 2022-01-01 PROCEDURE — 87449 NOS EACH ORGANISM AG IA: CPT

## 2022-01-01 PROCEDURE — 93010 ELECTROCARDIOGRAM REPORT: CPT | Performed by: INTERNAL MEDICINE

## 2022-01-01 PROCEDURE — 87077 CULTURE AEROBIC IDENTIFY: CPT

## 2022-01-01 PROCEDURE — 87324 CLOSTRIDIUM AG IA: CPT

## 2022-01-01 PROCEDURE — 2700000000 HC OXYGEN THERAPY PER DAY

## 2022-01-01 PROCEDURE — 82803 BLOOD GASES ANY COMBINATION: CPT

## 2022-01-01 PROCEDURE — 97110 THERAPEUTIC EXERCISES: CPT

## 2022-01-01 PROCEDURE — 80162 ASSAY OF DIGOXIN TOTAL: CPT

## 2022-01-01 PROCEDURE — 87186 SC STD MICRODIL/AGAR DIL: CPT

## 2022-01-01 PROCEDURE — 87636 SARSCOV2 & INF A&B AMP PRB: CPT

## 2022-01-01 PROCEDURE — 97165 OT EVAL LOW COMPLEX 30 MIN: CPT

## 2022-01-01 PROCEDURE — 96374 THER/PROPH/DIAG INJ IV PUSH: CPT

## 2022-01-01 PROCEDURE — 0HBRXZZ EXCISION OF TOE NAIL, EXTERNAL APPROACH: ICD-10-PCS | Performed by: PODIATRIST

## 2022-01-01 PROCEDURE — 70450 CT HEAD/BRAIN W/O DYE: CPT

## 2022-01-01 PROCEDURE — G0008 ADMIN INFLUENZA VIRUS VAC: HCPCS | Performed by: INTERNAL MEDICINE

## 2022-01-01 PROCEDURE — XW033N5 INTRODUCTION OF MEROPENEM-VABORBACTAM ANTI-INFECTIVE INTO PERIPHERAL VEIN, PERCUTANEOUS APPROACH, NEW TECHNOLOGY GROUP 5: ICD-10-PCS | Performed by: EMERGENCY MEDICINE

## 2022-01-01 PROCEDURE — 96366 THER/PROPH/DIAG IV INF ADDON: CPT

## 2022-01-01 PROCEDURE — 6360000002 HC RX W HCPCS: Performed by: PHYSICIAN ASSISTANT

## 2022-01-01 RX ORDER — MORPHINE SULFATE 4 MG/ML
4 INJECTION, SOLUTION INTRAMUSCULAR; INTRAVENOUS
Status: DISCONTINUED | OUTPATIENT
Start: 2022-01-01 | End: 2022-01-01 | Stop reason: HOSPADM

## 2022-01-01 RX ORDER — MIDAZOLAM HYDROCHLORIDE 2 MG/2ML
2 INJECTION, SOLUTION INTRAMUSCULAR; INTRAVENOUS
Status: DISCONTINUED | OUTPATIENT
Start: 2022-01-01 | End: 2022-01-01 | Stop reason: HOSPADM

## 2022-01-01 RX ORDER — SODIUM CHLORIDE 0.9 % (FLUSH) 0.9 %
5-40 SYRINGE (ML) INJECTION EVERY 12 HOURS SCHEDULED
Status: DISCONTINUED | OUTPATIENT
Start: 2022-01-01 | End: 2022-01-01 | Stop reason: HOSPADM

## 2022-01-01 RX ORDER — LORATADINE 10 MG/1
10 TABLET ORAL DAILY
Status: DISCONTINUED | OUTPATIENT
Start: 2022-01-01 | End: 2022-01-01

## 2022-01-01 RX ORDER — ENOXAPARIN SODIUM 100 MG/ML
30 INJECTION SUBCUTANEOUS DAILY
Status: DISCONTINUED | OUTPATIENT
Start: 2022-01-01 | End: 2022-01-01 | Stop reason: HOSPADM

## 2022-01-01 RX ORDER — SODIUM CHLORIDE 9 MG/ML
30 INJECTION, SOLUTION INTRAVENOUS ONCE
Status: COMPLETED | OUTPATIENT
Start: 2022-01-01 | End: 2022-01-01

## 2022-01-01 RX ORDER — DONEPEZIL HYDROCHLORIDE 5 MG/1
5 TABLET, FILM COATED ORAL NIGHTLY
Status: DISCONTINUED | OUTPATIENT
Start: 2022-01-01 | End: 2022-01-01 | Stop reason: HOSPADM

## 2022-01-01 RX ORDER — DEXAMETHASONE SODIUM PHOSPHATE 10 MG/ML
6 INJECTION, SOLUTION INTRAMUSCULAR; INTRAVENOUS EVERY 24 HOURS
Status: DISCONTINUED | OUTPATIENT
Start: 2022-01-01 | End: 2022-01-01 | Stop reason: HOSPADM

## 2022-01-01 RX ORDER — IPRATROPIUM BROMIDE AND ALBUTEROL SULFATE 2.5; .5 MG/3ML; MG/3ML
1 SOLUTION RESPIRATORY (INHALATION) EVERY 4 HOURS PRN
Status: DISCONTINUED | OUTPATIENT
Start: 2022-01-01 | End: 2022-01-01 | Stop reason: HOSPADM

## 2022-01-01 RX ORDER — ONDANSETRON 4 MG/1
4 TABLET, ORALLY DISINTEGRATING ORAL EVERY 8 HOURS PRN
Status: DISCONTINUED | OUTPATIENT
Start: 2022-01-01 | End: 2022-01-01 | Stop reason: HOSPADM

## 2022-01-01 RX ORDER — GABAPENTIN 100 MG/1
100 CAPSULE ORAL 3 TIMES DAILY
Status: DISCONTINUED | OUTPATIENT
Start: 2022-01-01 | End: 2022-01-01 | Stop reason: HOSPADM

## 2022-01-01 RX ORDER — POTASSIUM CHLORIDE 20 MEQ/1
40 TABLET, EXTENDED RELEASE ORAL PRN
Status: DISCONTINUED | OUTPATIENT
Start: 2022-01-01 | End: 2022-01-01 | Stop reason: HOSPADM

## 2022-01-01 RX ORDER — SODIUM CHLORIDE 0.9 % (FLUSH) 0.9 %
5-40 SYRINGE (ML) INJECTION PRN
Status: DISCONTINUED | OUTPATIENT
Start: 2022-01-01 | End: 2022-01-01 | Stop reason: HOSPADM

## 2022-01-01 RX ORDER — ALBUTEROL SULFATE 90 UG/1
2 AEROSOL, METERED RESPIRATORY (INHALATION) EVERY 6 HOURS PRN
Status: DISCONTINUED | OUTPATIENT
Start: 2022-01-01 | End: 2022-01-01 | Stop reason: HOSPADM

## 2022-01-01 RX ORDER — NOREPINEPHRINE BIT/0.9 % NACL 16MG/250ML
1-100 INFUSION BOTTLE (ML) INTRAVENOUS CONTINUOUS
Status: DISCONTINUED | OUTPATIENT
Start: 2022-01-01 | End: 2022-01-01 | Stop reason: HOSPADM

## 2022-01-01 RX ORDER — SPIRONOLACTONE 25 MG/1
12.5 TABLET ORAL DAILY
Status: DISCONTINUED | OUTPATIENT
Start: 2022-01-01 | End: 2022-01-01

## 2022-01-01 RX ORDER — SODIUM CHLORIDE 9 MG/ML
INJECTION, SOLUTION INTRAVENOUS PRN
Status: DISCONTINUED | OUTPATIENT
Start: 2022-01-01 | End: 2022-01-01 | Stop reason: HOSPADM

## 2022-01-01 RX ORDER — ACETAMINOPHEN 325 MG/1
650 TABLET ORAL EVERY 6 HOURS PRN
Status: DISCONTINUED | OUTPATIENT
Start: 2022-01-01 | End: 2022-01-01 | Stop reason: HOSPADM

## 2022-01-01 RX ORDER — SODIUM CHLORIDE 9 MG/ML
INJECTION, SOLUTION INTRAVENOUS CONTINUOUS
Status: DISCONTINUED | OUTPATIENT
Start: 2022-01-01 | End: 2022-01-01 | Stop reason: HOSPADM

## 2022-01-01 RX ORDER — VENLAFAXINE HYDROCHLORIDE 37.5 MG/1
150 CAPSULE, EXTENDED RELEASE ORAL DAILY
Status: DISCONTINUED | OUTPATIENT
Start: 2022-01-01 | End: 2022-01-01 | Stop reason: HOSPADM

## 2022-01-01 RX ORDER — SODIUM CHLORIDE 0.9 % (FLUSH) 0.9 %
10 SYRINGE (ML) INJECTION PRN
Status: DISCONTINUED | OUTPATIENT
Start: 2022-01-01 | End: 2022-01-01 | Stop reason: HOSPADM

## 2022-01-01 RX ORDER — SODIUM CHLORIDE, SODIUM LACTATE, POTASSIUM CHLORIDE, CALCIUM CHLORIDE 600; 310; 30; 20 MG/100ML; MG/100ML; MG/100ML; MG/100ML
INJECTION, SOLUTION INTRAVENOUS ONCE
Status: COMPLETED | OUTPATIENT
Start: 2022-01-01 | End: 2022-01-01

## 2022-01-01 RX ORDER — CETIRIZINE HYDROCHLORIDE 10 MG/1
5 TABLET ORAL DAILY
Status: DISCONTINUED | OUTPATIENT
Start: 2022-01-01 | End: 2022-01-01 | Stop reason: HOSPADM

## 2022-01-01 RX ORDER — DIGOXIN 125 MCG
125 TABLET ORAL DAILY
Status: DISCONTINUED | OUTPATIENT
Start: 2022-01-01 | End: 2022-01-01

## 2022-01-01 RX ORDER — CLINDAMYCIN PHOSPHATE 600 MG/50ML
600 INJECTION INTRAVENOUS ONCE
Status: COMPLETED | OUTPATIENT
Start: 2022-01-01 | End: 2022-01-01

## 2022-01-01 RX ORDER — BUDESONIDE AND FORMOTEROL FUMARATE DIHYDRATE 160; 4.5 UG/1; UG/1
2 AEROSOL RESPIRATORY (INHALATION) 2 TIMES DAILY
Status: DISCONTINUED | OUTPATIENT
Start: 2022-01-01 | End: 2022-01-01 | Stop reason: HOSPADM

## 2022-01-01 RX ORDER — AZITHROMYCIN 250 MG/1
500 TABLET, FILM COATED ORAL EVERY 24 HOURS
Status: DISCONTINUED | OUTPATIENT
Start: 2022-01-01 | End: 2022-01-01

## 2022-01-01 RX ORDER — SODIUM CHLORIDE 9 MG/ML
25 INJECTION, SOLUTION INTRAVENOUS PRN
Status: DISCONTINUED | OUTPATIENT
Start: 2022-01-01 | End: 2022-01-01 | Stop reason: HOSPADM

## 2022-01-01 RX ORDER — 0.9 % SODIUM CHLORIDE 0.9 %
500 INTRAVENOUS SOLUTION INTRAVENOUS PRN
Status: DISCONTINUED | OUTPATIENT
Start: 2022-01-01 | End: 2022-01-01 | Stop reason: HOSPADM

## 2022-01-01 RX ORDER — ONDANSETRON 2 MG/ML
4 INJECTION INTRAMUSCULAR; INTRAVENOUS EVERY 6 HOURS PRN
Status: DISCONTINUED | OUTPATIENT
Start: 2022-01-01 | End: 2022-01-01 | Stop reason: HOSPADM

## 2022-01-01 RX ORDER — CEPHALEXIN 500 MG/1
500 CAPSULE ORAL 3 TIMES DAILY
Qty: 30 CAPSULE | Refills: 0 | Status: SHIPPED | OUTPATIENT
Start: 2022-01-01 | End: 2022-01-01

## 2022-01-01 RX ORDER — TRAZODONE HYDROCHLORIDE 50 MG/1
50 TABLET ORAL NIGHTLY
Status: DISCONTINUED | OUTPATIENT
Start: 2022-01-01 | End: 2022-01-01 | Stop reason: HOSPADM

## 2022-01-01 RX ORDER — ACETAMINOPHEN 650 MG/1
650 SUPPOSITORY RECTAL EVERY 6 HOURS PRN
Status: DISCONTINUED | OUTPATIENT
Start: 2022-01-01 | End: 2022-01-01 | Stop reason: HOSPADM

## 2022-01-01 RX ORDER — HYDRALAZINE HYDROCHLORIDE 20 MG/ML
10 INJECTION INTRAMUSCULAR; INTRAVENOUS EVERY 6 HOURS PRN
Status: DISCONTINUED | OUTPATIENT
Start: 2022-01-01 | End: 2022-01-01 | Stop reason: HOSPADM

## 2022-01-01 RX ORDER — DEXAMETHASONE 2 MG/1
2 TABLET ORAL 2 TIMES DAILY WITH MEALS
Qty: 14 TABLET | Refills: 0 | Status: SHIPPED | OUTPATIENT
Start: 2022-01-01 | End: 2022-01-01

## 2022-01-01 RX ORDER — VENLAFAXINE HYDROCHLORIDE 75 MG/1
150 CAPSULE, EXTENDED RELEASE ORAL DAILY
COMMUNITY

## 2022-01-01 RX ORDER — POTASSIUM CHLORIDE 1.5 G/1.77G
20 POWDER, FOR SOLUTION ORAL DAILY
Status: DISCONTINUED | OUTPATIENT
Start: 2022-01-01 | End: 2022-01-01

## 2022-01-01 RX ORDER — POTASSIUM CHLORIDE 7.45 MG/ML
10 INJECTION INTRAVENOUS PRN
Status: DISCONTINUED | OUTPATIENT
Start: 2022-01-01 | End: 2022-01-01 | Stop reason: HOSPADM

## 2022-01-01 RX ORDER — FUROSEMIDE 40 MG/1
40 TABLET ORAL DAILY
Status: DISCONTINUED | OUTPATIENT
Start: 2022-01-01 | End: 2022-01-01

## 2022-01-01 RX ORDER — SODIUM CHLORIDE, SODIUM LACTATE, POTASSIUM CHLORIDE, CALCIUM CHLORIDE 600; 310; 30; 20 MG/100ML; MG/100ML; MG/100ML; MG/100ML
1000 INJECTION, SOLUTION INTRAVENOUS ONCE
Status: COMPLETED | OUTPATIENT
Start: 2022-01-01 | End: 2022-01-01

## 2022-01-01 RX ORDER — IPRATROPIUM BROMIDE AND ALBUTEROL SULFATE 2.5; .5 MG/3ML; MG/3ML
1 SOLUTION RESPIRATORY (INHALATION)
Status: DISCONTINUED | OUTPATIENT
Start: 2022-01-01 | End: 2022-01-01

## 2022-01-01 RX ORDER — IPRATROPIUM BROMIDE AND ALBUTEROL SULFATE 2.5; .5 MG/3ML; MG/3ML
1 SOLUTION RESPIRATORY (INHALATION) 3 TIMES DAILY
Status: DISCONTINUED | OUTPATIENT
Start: 2022-01-01 | End: 2022-01-01 | Stop reason: HOSPADM

## 2022-01-01 RX ORDER — CETIRIZINE HYDROCHLORIDE 10 MG/1
10 TABLET ORAL DAILY
COMMUNITY

## 2022-01-01 RX ORDER — DEXAMETHASONE SODIUM PHOSPHATE 10 MG/ML
6 INJECTION, SOLUTION INTRAMUSCULAR; INTRAVENOUS ONCE
Status: COMPLETED | OUTPATIENT
Start: 2022-01-01 | End: 2022-01-01

## 2022-01-01 RX ORDER — VENLAFAXINE HYDROCHLORIDE 150 MG/1
150 CAPSULE, EXTENDED RELEASE ORAL DAILY
Status: DISCONTINUED | OUTPATIENT
Start: 2022-01-01 | End: 2022-01-01 | Stop reason: HOSPADM

## 2022-01-01 RX ADMIN — Medication 10 ML: at 08:19

## 2022-01-01 RX ADMIN — BARICITINIB 2 MG: 2 TABLET, FILM COATED ORAL at 10:31

## 2022-01-01 RX ADMIN — SODIUM CHLORIDE, POTASSIUM CHLORIDE, SODIUM LACTATE AND CALCIUM CHLORIDE 1000 ML: 600; 310; 30; 20 INJECTION, SOLUTION INTRAVENOUS at 20:29

## 2022-01-01 RX ADMIN — VANCOMYCIN HYDROCHLORIDE 1250 MG: 10 INJECTION, POWDER, LYOPHILIZED, FOR SOLUTION INTRAVENOUS at 21:15

## 2022-01-01 RX ADMIN — IPRATROPIUM BROMIDE AND ALBUTEROL SULFATE 1 AMPULE: .5; 3 SOLUTION RESPIRATORY (INHALATION) at 15:47

## 2022-01-01 RX ADMIN — CLINDAMYCIN PHOSPHATE 600 MG: 600 INJECTION, SOLUTION INTRAVENOUS at 20:27

## 2022-01-01 RX ADMIN — TRAZODONE HYDROCHLORIDE 50 MG: 50 TABLET ORAL at 21:26

## 2022-01-01 RX ADMIN — ENOXAPARIN SODIUM 40 MG: 40 INJECTION SUBCUTANEOUS at 21:26

## 2022-01-01 RX ADMIN — SODIUM CHLORIDE: 9 INJECTION, SOLUTION INTRAVENOUS at 23:26

## 2022-01-01 RX ADMIN — DEXAMETHASONE SODIUM PHOSPHATE 6 MG: 10 INJECTION INTRAMUSCULAR; INTRAVENOUS at 08:21

## 2022-01-01 RX ADMIN — CETIRIZINE HYDROCHLORIDE 5 MG: 10 TABLET, FILM COATED ORAL at 08:03

## 2022-01-01 RX ADMIN — ENOXAPARIN SODIUM 40 MG: 40 INJECTION SUBCUTANEOUS at 20:36

## 2022-01-01 RX ADMIN — Medication 2 PUFF: at 08:47

## 2022-01-01 RX ADMIN — GABAPENTIN 100 MG: 100 CAPSULE ORAL at 14:36

## 2022-01-01 RX ADMIN — TRAZODONE HYDROCHLORIDE 50 MG: 50 TABLET ORAL at 23:20

## 2022-01-01 RX ADMIN — TRAZODONE HYDROCHLORIDE 50 MG: 50 TABLET ORAL at 20:36

## 2022-01-01 RX ADMIN — ENOXAPARIN SODIUM 40 MG: 40 INJECTION SUBCUTANEOUS at 23:19

## 2022-01-01 RX ADMIN — GABAPENTIN 100 MG: 100 CAPSULE ORAL at 21:26

## 2022-01-01 RX ADMIN — SODIUM CHLORIDE: 9 INJECTION, SOLUTION INTRAVENOUS at 00:22

## 2022-01-01 RX ADMIN — SODIUM CHLORIDE: 9 INJECTION, SOLUTION INTRAVENOUS at 03:05

## 2022-01-01 RX ADMIN — CETIRIZINE HYDROCHLORIDE 5 MG: 10 TABLET, FILM COATED ORAL at 09:05

## 2022-01-01 RX ADMIN — GABAPENTIN 100 MG: 100 CAPSULE ORAL at 23:20

## 2022-01-01 RX ADMIN — DEXAMETHASONE SODIUM PHOSPHATE 6 MG: 10 INJECTION INTRAMUSCULAR; INTRAVENOUS at 09:07

## 2022-01-01 RX ADMIN — BARICITINIB 2 MG: 2 TABLET, FILM COATED ORAL at 08:03

## 2022-01-01 RX ADMIN — GABAPENTIN 100 MG: 100 CAPSULE ORAL at 08:20

## 2022-01-01 RX ADMIN — GABAPENTIN 100 MG: 100 CAPSULE ORAL at 08:03

## 2022-01-01 RX ADMIN — IPRATROPIUM BROMIDE AND ALBUTEROL SULFATE 1 AMPULE: .5; 3 SOLUTION RESPIRATORY (INHALATION) at 10:15

## 2022-01-01 RX ADMIN — BARICITINIB 2 MG: 2 TABLET, FILM COATED ORAL at 17:25

## 2022-01-01 RX ADMIN — Medication 10 ML: at 09:07

## 2022-01-01 RX ADMIN — ENOXAPARIN SODIUM 40 MG: 40 INJECTION SUBCUTANEOUS at 08:04

## 2022-01-01 RX ADMIN — Medication 10 ML: at 23:15

## 2022-01-01 RX ADMIN — VANCOMYCIN HYDROCHLORIDE 1500 MG: 10 INJECTION, POWDER, LYOPHILIZED, FOR SOLUTION INTRAVENOUS at 20:00

## 2022-01-01 RX ADMIN — IPRATROPIUM BROMIDE AND ALBUTEROL SULFATE 1 AMPULE: .5; 3 SOLUTION RESPIRATORY (INHALATION) at 11:40

## 2022-01-01 RX ADMIN — Medication 1000 MG: at 23:20

## 2022-01-01 RX ADMIN — IPRATROPIUM BROMIDE AND ALBUTEROL SULFATE 1 AMPULE: .5; 3 SOLUTION RESPIRATORY (INHALATION) at 20:39

## 2022-01-01 RX ADMIN — VENLAFAXINE HYDROCHLORIDE 150 MG: 150 CAPSULE, EXTENDED RELEASE ORAL at 09:06

## 2022-01-01 RX ADMIN — AZITHROMYCIN MONOHYDRATE 500 MG: 250 TABLET ORAL at 23:20

## 2022-01-01 RX ADMIN — CETIRIZINE HYDROCHLORIDE 5 MG: 10 TABLET, FILM COATED ORAL at 08:20

## 2022-01-01 RX ADMIN — Medication 2 PUFF: at 21:42

## 2022-01-01 RX ADMIN — SODIUM CHLORIDE 2760 ML: 9 INJECTION, SOLUTION INTRAVENOUS at 19:00

## 2022-01-01 RX ADMIN — VENLAFAXINE HYDROCHLORIDE 150 MG: 150 CAPSULE, EXTENDED RELEASE ORAL at 08:03

## 2022-01-01 RX ADMIN — MEROPENEM 1000 MG: 1 INJECTION, POWDER, FOR SOLUTION INTRAVENOUS at 19:26

## 2022-01-01 RX ADMIN — IPRATROPIUM BROMIDE AND ALBUTEROL SULFATE 1 AMPULE: .5; 3 SOLUTION RESPIRATORY (INHALATION) at 21:40

## 2022-01-01 RX ADMIN — DEXAMETHASONE SODIUM PHOSPHATE 6 MG: 10 INJECTION, SOLUTION INTRAMUSCULAR; INTRAVENOUS at 17:12

## 2022-01-01 RX ADMIN — IPRATROPIUM BROMIDE AND ALBUTEROL SULFATE 1 AMPULE: .5; 3 SOLUTION RESPIRATORY (INHALATION) at 08:47

## 2022-01-01 RX ADMIN — SODIUM CHLORIDE: 9 INJECTION, SOLUTION INTRAVENOUS at 21:22

## 2022-01-01 RX ADMIN — Medication 1000 MG: at 09:08

## 2022-01-01 RX ADMIN — ENOXAPARIN SODIUM 40 MG: 40 INJECTION SUBCUTANEOUS at 09:08

## 2022-01-01 RX ADMIN — Medication 10 ML: at 21:15

## 2022-01-01 RX ADMIN — Medication 5 MCG/MIN: at 19:18

## 2022-01-01 RX ADMIN — DEXAMETHASONE SODIUM PHOSPHATE 6 MG: 10 INJECTION INTRAMUSCULAR; INTRAVENOUS at 08:05

## 2022-01-01 RX ADMIN — GABAPENTIN 100 MG: 100 CAPSULE ORAL at 09:06

## 2022-01-01 RX ADMIN — INFLUENZA A VIRUS A/VICTORIA/2570/2019 IVR-215 (H1N1) ANTIGEN (PROPIOLACTONE INACTIVATED), INFLUENZA A VIRUS A/CAMBODIA/E0826360/2020 IVR-224 (H3N2) ANTIGEN (PROPIOLACTONE INACTIVATED), INFLUENZA B VIRUS B/VICTORIA/705/2018 BVR-11 ANTIGEN (PROPIOLACTONE INACTIVATED), INFLUENZA B VIRUS B/PHUKET/3073/2013 BVR-1B ANTIGEN (PROPIOLACTONE INACTIVATED) 0.5 ML: 15; 15; 15; 15 INJECTION, SUSPENSION INTRAMUSCULAR at 14:36

## 2022-01-01 RX ADMIN — VASOPRESSIN 0.03 UNITS/MIN: 20 INJECTION INTRAVENOUS at 03:03

## 2022-01-01 RX ADMIN — Medication 2 PUFF: at 20:39

## 2022-01-01 RX ADMIN — SODIUM CHLORIDE: 9 INJECTION, SOLUTION INTRAVENOUS at 20:39

## 2022-01-01 RX ADMIN — SODIUM CHLORIDE, POTASSIUM CHLORIDE, SODIUM LACTATE AND CALCIUM CHLORIDE 999 ML/HR: 600; 310; 30; 20 INJECTION, SOLUTION INTRAVENOUS at 18:59

## 2022-01-01 RX ADMIN — Medication 1000 MG: at 09:35

## 2022-01-01 RX ADMIN — Medication 10 ML: at 20:48

## 2022-01-01 RX ADMIN — GABAPENTIN 100 MG: 100 CAPSULE ORAL at 20:36

## 2022-01-01 RX ADMIN — ENOXAPARIN SODIUM 40 MG: 40 INJECTION SUBCUTANEOUS at 08:20

## 2022-01-01 RX ADMIN — VENLAFAXINE HYDROCHLORIDE 150 MG: 150 CAPSULE, EXTENDED RELEASE ORAL at 08:21

## 2022-01-01 RX ADMIN — GABAPENTIN 100 MG: 100 CAPSULE ORAL at 14:32

## 2022-01-01 RX ADMIN — Medication 2 PUFF: at 11:46

## 2022-01-01 RX ADMIN — GABAPENTIN 100 MG: 100 CAPSULE ORAL at 12:12

## 2022-01-01 ASSESSMENT — PAIN SCALES - GENERAL
PAINLEVEL_OUTOF10: 0

## 2022-01-01 ASSESSMENT — ENCOUNTER SYMPTOMS
ABDOMINAL PAIN: 0
NAUSEA: 0
WHEEZING: 0
COUGH: 1
VOMITING: 0
SHORTNESS OF BREATH: 1
RHINORRHEA: 0
DIARRHEA: 0

## 2022-01-01 ASSESSMENT — PAIN DESCRIPTION - PAIN TYPE: TYPE: ACUTE PAIN

## 2022-01-30 PROBLEM — F32.A DEPRESSION: Status: ACTIVE | Noted: 2022-01-01

## 2022-01-30 PROBLEM — J96.00 ACUTE RESPIRATORY FAILURE (HCC): Status: ACTIVE | Noted: 2022-01-01

## 2022-01-30 PROBLEM — F03.90 DEMENTIA (HCC): Status: ACTIVE | Noted: 2022-01-01

## 2022-01-30 NOTE — ED PROVIDER NOTES
[address] [facility]        Pt Name: Min Cross  MRN: [id number]  [place] 8/6/1931  Date of evaluation: 1/30/2022  Provider: Yovanny Carrillo PA-C  PCP: Amy Pan DO  Note Started: 4:40 PM EST       DONTRELL. I have evaluated this patient. My supervising physician was available for consultation. CHIEF COMPLAINT       Chief Complaint   Patient presents with    Concern For [id number]     from home via [facility] c/o flu-like symptoms x 2 weeks with increased fever and shortness of breath today. HISTORY OF PRESENT ILLNESS   (Location, Timing/Onset, Context/Setting, Quality, Duration, Modifying Factors, Severity, Associated Signs and Symptoms)  Note limiting factors. Chief Complaint: MEENAKSHI Cross is a 80 y.o. female who presents for evaluation of \"flulike\"symptoms x2 weeks with cough body aches and headache. Patient has history of dementia and is poor historian. Information mainly obtained through family and EMS. No known fevers or chills. EMS reports mild hypoxia at 90% on room air with no prior oxygen requirement. She was on 4 L upon arrival to the ED. Patient does report some shortness of breath. No chest pain. No abdominal pain nausea vomiting or diarrhea. No known sick contacts with similar symptoms. She is not vaccinated against COVID-19. She has no other complaints or concerns at this time    Nursing Notes were all reviewed and agreed with or any disagreements were addressed in the HPI. REVIEW OF SYSTEMS    (2-9 systems for level 4, 10 or more for level 5)     Review of Systems   Constitutional: Negative for appetite change, chills and fever. HENT: Positive for congestion. Negative for rhinorrhea. Eyes: Negative for visual disturbance. Respiratory: Positive for cough and shortness of breath. Negative for wheezing. Cardiovascular: Negative for chest pain.    Gastrointestinal: Negative for abdominal pain, diarrhea, nausea and vomiting. Genitourinary: Negative for difficulty urinating, dysuria and hematuria. Musculoskeletal: Negative for neck pain and neck stiffness. Skin: Negative for rash. Neurological: Positive for weakness and headaches. Negative for dizziness, syncope, light-headedness and numbness. Positives and Pertinent negatives as per HPI. Except as noted above in the ROS, all other systems were reviewed and negative. PAST MEDICAL HISTORY     Past Medical History:   Diagnosis Date    Arthritis     [facility]est[facility]     [facility]     D[facility]     Depression     Hypertension          SURGICAL HISTORY     Past Surgical History:   Procedure Laterality Date    HYSTERECTOMY           CURRENTMEDICATIONS       Previous Medications    ALBUTEROL SULFATE HFA (PROVENTIL HFA) 108 (90 BASE) MCG/ACT INHALER    Inhale 2 puffs into the lungs every 6 hours as needed for Wheezing    BUDESONIDE-FORMOTEROL (SYMBICORT) 160-4.5 MCG/ACT AERO    Inhale 2 puffs into the lungs 2 times daily    CETIRIZINE (ZYRTEC) 10 MG TABLET    Take 10 mg by mouth daily    DIGOXIN (LANOXIN) 125 MCG TABLET    Take 125 mcg by mouth daily    DONEPEZIL (ARICEPT) 5 MG TABLET    Take 5 mg by mouth nightly    FUROSEMIDE (LASIX) 40 MG TABLET    Take 40 mg by mouth as needed     GABAPENTIN (NEURONTIN) 100 MG CAPSULE    Take 100 mg by mouth 3 times daily. LORATADINE (CLARITIN) 10 MG TABLET    Take 10 mg by mouth daily    POTASSIUM CHLORIDE (KLOR-CON) 20 MEQ PACKET    Take 20 mEq by mouth daily    SPIRONOLACTONE (ALDACTONE) 25 MG TABLET    Take 0.5 tablets by mouth daily    TRAZODONE (DESYREL) 50 MG TABLET    Take 50 mg by mouth nightly    VENLAFAXINE (EFFEXOR) 75 MG TABLET    Take by mouth daily 2 tabs po daily         ALLERGIES     Pcn [penicillins]    FAMILYHISTORY     History reviewed. No pertinent family history.        SOCIAL HISTORY       Social History     Tobacco Use    Smoking status: Former Smoker    Smokeless tobacco: Never Used    Tobacco comment: 7 years   Vaping Use    Vaping Use: Never used   Substance Use Topics    Alcohol use: No    Drug use: Not on file       SCREENINGS             PHYSICAL EXAM    (up to 7 for level 4, 8 or more for level 5)     ED Triage Vitals   BP Temp Temp src Pulse Resp SpO2 Height Weight   -- -- -- -- -- -- -- --       Physical Exam  Vitals and nursing note reviewed. Constitutional:       General: She is not in acute distress. Appearance: She is well-developed. She is not ill-appearing, toxic-appearing or diaphoretic. HENT:      Head: Normocephalic and atraumatic. Right Ear: External ear normal.      Left Ear: External ear normal.      Nose: Nose normal.      Mouth/Throat:      Mouth: Mucous membranes are moist.      Pharynx: Oropharynx is clear. No oropharyngeal exudate or posterior oropharyngeal erythema. Eyes:      General:         Right eye: No discharge. Left eye: No discharge. Extraocular Movements: Extraocular movements intact. Conjunctiva/sclera: Conjunctivae normal.      Pupils: Pupils are equal, round, and reactive to light. Cardiovascular:      Rate and Rhythm: Normal rate and regular rhythm. Heart sounds: Normal heart sounds. Pulmonary:      Effort: Pulmonary effort is normal. No respiratory distress. Breath sounds: Rhonchi present. Abdominal:      General: There is no distension. Palpations: Abdomen is soft. Tenderness: There is no abdominal tenderness. Musculoskeletal:         General: Normal range of motion. Cervical back: Normal range of motion and neck supple. Skin:     General: Skin is warm and dry. Neurological:      Mental Status: She is alert and oriented to person, place, and time.    Psychiatric:         Behavior: Behavior normal.         DIAGNOSTIC RESULTS   LABS:    Labs Reviewed   CBC WITH AUTO DIFFERENTIAL - Abnormal; Notable for the following components:       Result Value    WBC 14.3 (*)     Neutrophils Absolute 12.0 (*)     All other components within normal limits    Narrative:     Performed at:  [facility] [address], [place], [facility] [address]   Phone [phone]   COMPREHENSIVE METABOLIC PANEL - Abnormal; Notable for the following components:    Glucose 131 (*)     GFR Non- 59 (*)     Albumin 3.0 (*)     Albumin/Globulin Ratio 0.7 (*)     Alkaline Phosphatase 151 (*)     ALT 8 (*)     All other components within normal limits    Narrative:     Performed at:  [facility] [address], [place], [facility] [place]   Phone [phone]   D-DIMER, QUANTITATIVE - Abnormal; Notable for the following components:    D-Dimer, Quant 820 (*)     All other components within normal limits    Narrative:     Performed at:  [facility] [address], [place], [address]   Phone [phone]   C-REACTIVE PROTEIN - Abnormal; Notable for the following components:    CRP 37.4 (*)     All other components within normal limits    Narrative:     Performed at:  [facility] [address], [place], [address]   Phone [phone]   BLOOD GAS, VENOUS - Abnormal; Notable for the following components:    pH, Fede 7.321 (*)     pCO2, Fede 65.9 (*)     pO2, Fede 60.6 (*)     HCO3, Venous 34.0 (*)     Base Excess, Fede 5.3 (*)     Carboxyhemoglobin 2.5 (*)     All other components within normal limits    Narrative:     Performed at:  [facility] [address], [place], [address]   Phone [phone]   LIPASE    Narrative:     Performed at:  [facility] [address], [place], [facility] [place]   Phone [phone]   TROPONIN    Narrative:     Performed at:  [facility]  [address] [place] [address]   Phone [phone]     Narrative:     Performed at:  [facility]  [address] [facility], [address]   Phone [phone]   PROCALCITONIN    Narrative:     Performed at:  [facility]  [address] [facility], [address]   Phone [phone]       When ordered only abnormal lab results are displayed. All other labs were within normal range or not returned as of this dictation. EKG: When ordered, EKG's are interpreted by the Emergency Department Physician in the absence of a cardiologist.  Please see their note for interpretation of EKG. RADIOLOGY:   Non-plain film images such as CT, Ultrasound and MRI are read by the radiologist. Plain radiographic images are visualized and preliminarily interpreted by the ED Provider with the below findings:        Interpretation per the Radiologist below, if available at the time of this note:    XR CHEST PORTABLE   Final Result   No acute abnormality. No results found. PROCEDURES   Unless otherwise noted below, none     Procedures    CRITICAL CARE TIME   There was a high probability of life-threatening clinical deterioration in the patient's condition requiring my urgent intervention. I personally saw the patient and independently provided 41 minutes of non-concurrent critical care out of the total shared critical care time provided, excluding separately reportable procedures.        CONSULTS:  None      EMERGENCY DEPARTMENT COURSE and DIFFERENTIAL DIAGNOSIS/MDM:   Vitals:    Vitals:    01/30/22 1700 01/30/22 1704 01/30/22 1730 01/30/22 1800   BP: 134/62  (!) 115/55 (!) 109/54   Pulse: 97 96 91 90   Resp: 11 12 15 15   Temp:       TempSrc:       SpO2: (!) 89% (!) 87% 91% 92%   Weight:       Height:           Patient was given the following medications:  Medications   dexamethasone (PF) (DECADRON) injection 6 mg (6 mg IntraVENous Given 1/30/22 1712)           Patient presents for evaluation of increasing shortness of breath with \"flulike symptoms. \"On exam, she is chronically ill-appearing but in no acute distress and nontoxic. Satting down to 86 to 88% on room air and placed on nasal cannula oxygen. Vitals otherwise stable and she is afebrile. She has coarse breath sounds bilaterally. No wheezing. Chest is nontender and abdomen is benign. Please see attending note for EKG interpretation. CBC and CMP are remarkable for leukocytosis of 14.3. Troponin negative.  with a D-dimer of 820. Procalcitonin is 0.05.  pH 7.32 with a PCO2 of 65.9. Covid swab is pending. Given 6 mg of Decadron. She will be admitted for further evaluation management of suspected Covid as source of respiratory failure with hypoxia new oxygen requirement. I spoke with the admitting physician, Dr. Drea Stack, who is agreeable to this plan will resume care of the patient at this time. Patient was informed and agreeable. She is stable for admission. FINAL IMPRESSION      1. Suspected COVID-19 virus infection    2. Acute respiratory failure with hypoxia [facility])          DISPOSITION/PLAN   DISPOSITION Decision To Admit 01/30/2022 06:07:58 PM      PATIENT REFERRED TO:  No follow-up provider specified.     DISCHARGE MEDICATIONS:  New Prescriptions    No medications on file       DISCONTINUED MEDICATIONS:  Discontinued Medications    No medications on file              (Please note that portions of this note were completed with a voice recognition program.  Efforts were made to edit the dictations but occasionally words are mis-transcribed.)    Kimberlee Hill PA-C (electronically signed)            Marlen Restrepo PA-C  01/30/22 6925

## 2022-01-30 NOTE — ED NOTES
SPO2 87% on RA, patient placed on 2L O2 via nasal canula at this time. Belen Norris notified.      Francois Maloney RN  01/30/22 2106

## 2022-01-30 NOTE — ED NOTES
Bed: 18  Expected date:   Expected time:   Means of arrival:   Comments:  [address] RN  01/30/22 9329

## 2022-01-30 NOTE — H&P
Negative for chest pain and leg swelling. Gastrointestinal: Negative for diarrhea, nausea and vomiting. Endocrine: Negative for polydipsia and polyphagia. Genitourinary: Negative for frequency, hematuria and urgency. Musculoskeletal: Negative for back pain and positive for myalgias. Skin: Negative for rash. Allergic/Immunologic: Negative for food allergies. Neurological: Negative for dizziness, seizures, syncope and facial asymmetry. +headache  Hematological: Negative for adenopathy. Psychiatric/Behavioral: Negative for dysphoric mood. The patient is not nervous/anxious. Past Medical History:   Past Medical History:   Diagnosis Date    Arthritis     [facility] [address] [facility]     [facility]     Dementia ([facility]     Depression     Hypertension        Past Surgical History:   Past Surgical History:   Procedure Laterality Date    HYSTERECTOMY         Social History:   Social History     Tobacco History     Smoking Status  Former Smoker    Smokeless Tobacco Use  Never Used    Tobacco Comment  7 years          Alcohol History     Alcohol Use Status  No          Drug Use     Drug Use Status  Not Asked          Sexual Activity     Sexually Active  Not Asked                Fam History: History reviewed. No pertinent family history. PFSH: The above PMHx, PSHx, SocHx, FamHx has been reviewed by myself. (1 area for detailed, 2-3 for comprehensive)      Code Status: Prior    Meds - following list of home medications fromelectronic chart has been reviewed by myself  Prior to Admission medications    Medication Sig Start Date End Date Taking? Authorizing Provider   cetirizine (ZYRTEC) 10 MG tablet Take 10 mg by mouth daily   Yes Historical Provider, MD   gabapentin (NEURONTIN) 100 MG capsule Take 100 mg by mouth 3 times daily.    Yes Historical Provider, MD   traZODone (DESYREL) 50 MG tablet Take 50 mg by mouth nightly   Yes Historical Provider, MD   albuterol sulfate HFA (PROVENTIL HFA) 108 (90 Base) MCG/ACT inhaler Inhale 2 puffs into the lungs every 6 hours as needed for Wheezing 9/27/18  Yes Shey Maxwell MD   budesonide-formoterol [facility]) 160-4.5 MCG/ACT AERO Inhale 2 puffs into the lungs 2 times daily 9/27/18  Yes Shey Maxwell MD   venlafaxine [facility]) 75 MG tablet Take by mouth daily 2 tabs po daily   Yes Historical Provider, MD   donepezil (ARICEPT) 5 MG tablet Take 5 mg by mouth nightly    Historical Provider, MD   loratadine (CLARITIN) 10 MG tablet Take 10 mg by mouth daily    Historical Provider, MD   potassium chloride (KLOR-CON) 20 MEQ packet Take 20 mEq by mouth daily    Historical Provider, MD   spironolactone (ALDACTONE) 25 MG tablet Take 0.5 tablets by mouth daily 10/10/18   [facility], APRN - CNP   furosemide (LASIX) 40 MG tablet Take 40 mg by mouth as needed     Historical Provider, MD   digoxin (LANOXIN) 125 MCG tablet Take 125 mcg by mouth daily    Historical Provider, MD         Allergies   Allergen Reactions    Pcn [Penicillins]              EXAM: (2-7 system for EPF/Detailed, ?8 for Comprehensive)  BP (!) 109/54   Pulse 90   Temp 98.9 °F (37.2 °C) (Oral)   Resp 15   Ht 4' 11\" (1.499 m)   Wt 220 lb (99.8 kg)   SpO2 92%   BMI 44.43 kg/m²   Constitutional: vitals as above: alert, appears stated age and cooperative    Psychiatric: normal insight and judgment, oriented to person, place, time, and general circumstances    Head: Normocephalic, without obvious abnormality, atraumatic    Eyes:lids and lashes normal, conjunctivae and sclerae normal and pupils equal, round, reactive to light and accomodation    EMNT: external ears normal, nares midline    Neck: no carotid bruit, supple, symmetrical, trachea midline and thyroid not enlarged, symmetric, no tenderness/mass/nodules     Respiratory: crackles bilat  Cardiovascular: normal rate, regular rhythm, normal S1 and S2 and no murmurs Gastrointestinal: soft, non-tender, non-distended, normal bowel sounds, no masses or organomegaly    Extremities: no clubbing, no edema    Skin:No rashes or nodules noted.     Neurologic:negative         LABS:  Labs Reviewed   CBC WITH AUTO DIFFERENTIAL - Abnormal; Notable for the following components:       Result Value    WBC 14.3 (*)     Neutrophils Absolute 12.0 (*)     All other components within normal limits    Narrative:     Performed at:  [facility] [address], [facility], [address]   Phone [phone]   COMPREHENSIVE METABOLIC PANEL - Abnormal; Notable for the following components:    Glucose 131 (*)     GFR Non- 59 (*)     Albumin 3.0 (*)     Albumin/Globulin Ratio 0.7 (*)     Alkaline Phosphatase 151 (*)     ALT 8 (*)     All other components within normal limits    Narrative:     Performed at:  [facility] [address], [facility], [address]   Phone [phone]   D-DIMER, QUANTITATIVE - Abnormal; Notable for the following components:    D-Dimer, Quant 820 (*)     All other components within normal limits    Narrative:     Performed at:  [facility] [address], [facility], [address]   Phone [phone]   C-REACTIVE PROTEIN - Abnormal; Notable for the following components:    CRP 37.4 (*)     All other components within normal limits    Narrative:     Performed at:  [facility] [address], [facility], [address]   Phone [phone]   BLOOD GAS, VENOUS - Abnormal; Notable for the following components:    pH, Fede 7.321 (*)     pCO2, Fede 65.9 (*)     pO2, Fede 60.6 (*)     HCO3, Venous 34.0 (*)     Base Excess, Fede 5.3 (*)     Carboxyhemoglobin 2.5 (*)     All other components within normal limits    Narrative:     Performed at:  [facility] [address], [facility], [address]   Phone [phone] LIPASE    Narrative:     Performed at:  [facility]  [address] [facility],  [place], [address]   Phone [phone]   TROPONIN    Narrative:     Performed at:  [facility]  [address] [facility],  [place], [address]   Phone [phone]   BRAIN NATRIURETIC PEPTIDE    Narrative:     Performed at:  [facility]  [address] [facility],  [place], [address]   Phone [phone]   PROCALCITONIN    Narrative:     Performed at:  [facility]  [address] [facility],  [place], [address]   Phone [phone]         IMAGING:  Imaging results from the ER have been reviewed in the computerized chart. XR CHEST PORTABLE    Result Date: 1/30/2022  EXAMINATION: ONE XRAY VIEW OF THE CHEST 1/30/2022 4:46 pm COMPARISON: 01/04/2021. HISTORY: ORDERING SYSTEM PROVIDED HISTORY: SOB TECHNOLOGIST PROVIDED HISTORY: Reason for exam:->SOB Reason for Exam: Concern For COVID-19 (from home via [facility] EMS c/o flu-like symptoms x 2 weeks with increased fever and shortness of breath today.) FINDINGS: Minimal left basilar likely scarring versus atelectasis. No focal consolidations, pleural effusions or pulmonary edema. No pneumothoraces. Cardiac and mediastinal silhouettes are within normal limits. No acute bony abnormality. No acute abnormality. EKG:   EKG from ER, reviewed by self - it shows normal sinus at 80  Old chart reviewed, EKG dated 1/3/21 is reviewed, there is  difference noted.     Old study shows afib at 80    Lab Results   Component Value Date    GLUCOSE 131 01/30/2022     Lab Results   Component Value Date    POCGLU 126 09/26/2018     BP (!) 109/54   Pulse 90   Temp 98.9 °F (37.2 °C) (Oral)   Resp 15   Ht 4' 11\" (1.499 m)   Wt 220 lb (99.8 kg)   SpO2 92%   BMI 44.43 kg/m²     MEDICAL DECISION MAKING:    Principal Problem:    Acute respiratory failure due to COVID-19 [facility]) -New Problem to me. Pt with new o2 requirement, suspected covid  Plan: admit, iv steroids, trend labs incl crp. covid test sent in ER  Active Problems:    HTN (hypertension)  -Established problem. Stable. 109/54  Plan: Pt home BP meds reviewed and will be continued. IV Hydralazine ordered for control of extremely high blood pressures. Will monitor labs to assess Creat/K for possible complications of medications. Suspected COVID-19  Plan: place in isolation awaiting swab result    Depression  Plan: Continue present orders/plan. Dementia ([facility] [address]  Plan: Continue present orders/plan. Diagnoses as listed above, designated as new or established and plan outlined for each. Data Reviewed:   (1) Lab tests were reviewed or ordered. (1) Radiology tests were reviewed or ordered. (1) Medical test (Echo, EKG, PFT/harsha) were ordered. (1)History was not obtained from someone other than patient  (1) Old records were reviewed - see HPI/MDM for pertinent details if review done. (2) Case was discussed with another health care provider: [facility] PA  (2) Imaging was viewed by myself. (2) EKG  was viewed by myself. The patient is being placed in inpatient status with the expectation of requiring a hospital stay spanning at least two midnights for care and treatment of the problems noted in the problem list.  This determination is also based on thepatients comorbidities and past medical history, the severity and timing of the signs and symptoms upon presentation.     (Please note that portions of this note were completed with a voice recognition program.  Efforts were made to edit the dictations but occasionally words are mis-transcribed.)      Electronically signed by: Brock Yepez MD 1/30/2022

## 2022-01-31 NOTE — CARE COORDINATION
Chart reviewed at this time. Pt from home, bedbound, lives with grandson. covid +, currently on 3 liters.      Jayleen Diaz RN, BSN  [phone]

## 2022-01-31 NOTE — PROGRESS NOTES
Physical Therapy    Facility/Department: [address] ONCOLOGY  Initial Assessment    NAME: Rene Rojas  : 1931  MRN: [id number]    Date of Service: 2022    Discharge Recommendations:Karly Goss scored a 15/24 on the AM-PAC short mobility form. Current research shows that an AM-PAC score of 17 or less is typically not associated with a discharge to the patient's home setting. Based on the patient's AM-PAC score and their current functional mobility deficits, it is recommended that the patient have 3-5 sessions per week of Physical Therapy at d/c to increase the patient's independence. Please see assessment section for further patient specific details. If patient discharges prior to next session this note will serve as a discharge summary. Please see below for the latest assessment towards goals. PT Equipment Recommendations  Equipment Needed: No    Assessment   Body structures, Functions, Activity limitations: Decreased functional mobility ; Decreased ADL status; Decreased ROM; Decreased strength;Decreased endurance;Decreased balance;Decreased posture;Decreased safe awareness;Decreased cognition  Assessment: Pt presents as below her baseline function, secondary to COVID infection. Pt would benefit from skilled PT services to promote safe return to PLOF. Prognosis: Good  Decision Making: Medium Complexity  PT Education: Goals;PT Role;Plan of Care  Patient Education: D/C recommendations--pt verbalized understanding but would benefit from reinforcement  REQUIRES PT FOLLOW UP: Yes  Activity Tolerance  Activity Tolerance: Patient Tolerated treatment well;Patient limited by cognitive status; Patient limited by endurance       Patient Diagnosis(es): The primary encounter diagnosis was Suspected COVID-19 virus infection. A diagnosis of Acute respiratory failure with hypoxia (HCC) was also pertinent to this visit.      has a past medical history of Arthritis, CHF (congestive heart failure) ([facility] [address] COPD (chronic obstructive pulmonary disease) ([facility] [address] Dementia ([facility] [address] Depression, and Hypertension. has a past surgical history that includes Hysterectomy. Restrictions  Restrictions/Precautions  Restrictions/Precautions: Fall Risk,Isolation (high fall risk, droplet + isolation)  Required Braces or Orthoses?: No  Position Activity Restriction  Other position/activity restrictions: Kulwinder Cardona is a 80 y.o. female who presents for evaluation of \"flulike\"symptoms x2 weeks with cough body aches and headache. Patient has history of dementia and is poor historian. Information mainly obtained through family and EMS. No known fevers or chills. EMS reports mild hypoxia at 90% on room air with no prior oxygen requirement. She was on 4 L upon arrival to the ED. Patient does report some shortness of breath. No chest pain. No abdominal pain nausea vomiting or diarrhea. No known sick contacts with similar symptoms. She is not vaccinated against COVID-19. She has no other complaints or concerns at this time  Vision/Hearing  Vision: Impaired  Hearing: Exceptions to [facility] Exceptions: Hard of hearing/hearing concerns     Subjective  General  Chart Reviewed: Yes  Patient assessed for rehabilitation services?: Yes  Response To Previous Treatment: Not applicable  Family / Caregiver Present: No  Diagnosis: Acute respiratory Failure due to COVID-19  Follows Commands: Within Functional Limits (Sycuan and dementia; occasional repetition of 1-2 step commands required)  General Comment  Comments: Pt supine in bed upon arrival.  Subjective  Subjective: Pt agreeable to PT/OT eval.  Pain Screening  Patient Currently in Pain: Denies  Vital Signs  Patient Currently in Pain: Denies       Orientation  Orientation  Overall Orientation Status: Impaired  Orientation Level: Oriented to person;Oriented to place; Disoriented to time;Disoriented to situation  Social/Functional History  Social/Functional History  Lives With: Other (comment) (grandson)  Type of Home: House  Home Layout: One level  Home Access: Level entry  Bathroom Shower/Tub: Tub/Shower unit,Shower chair with back  Bathroom Toilet: Standard  Bathroom Accessibility: Walker accessible  Home Equipment: Rolling walker  Receives Help From: Family  ADL Assistance: Independent  Homemaking Assistance: Needs assistance (not responsible for any homemaking activities)  Ambulation Assistance: Independent  Transfer Assistance: Independent  Occupation: Retired  Type of occupation: hairdresser  Additional Comments: no falls within the last 6 months  Cognition        Objective     Observation/Palpation  Posture: Good  Observation: Pt on 2L SpO2 at rest. After minimal-moderate exertion (short distance ambulation and seated ADLs), Pt O2 at 90-92%. AROM RLE (degrees)  RLE AROM: WFL  AROM LLE (degrees)  LLE AROM : WFL  Strength RLE  Strength RLE: WFL  Strength LLE  Strength LLE: WFL  Tone RLE  RLE Tone: Normotonic  Tone LLE  LLE Tone: Normotonic  Motor Control  Gross Motor?: WFL  Sensation  Overall Sensation Status: WNL  Bed mobility  Supine to Sit: Stand by assistance  Scooting: Stand by assistance  Transfers  Sit to Stand: Contact guard assistance;Minimal Assistance (CGA from EOB, min A from low toilet)  Stand to sit: Contact guard assistance  Ambulation  Ambulation?: Yes  Ambulation 1  Surface: level tile  Device: Rolling Walker  Assistance: Minimal assistance  Quality of Gait: Pt ambulates with decreased step length, slow brandyn, wide GILDARDO, significant forward flexed posture, requiring cues and occasional assistance for walker management, no LOB.   Distance: ~8 ft + ~6 ft  Stairs/Curb  Stairs?: No     Balance  Posture: Fair  Sitting - Static: Good;-  Sitting - Dynamic: Fair;+  Standing - Static: Fair  Standing - Dynamic: Fair;-        Plan   Plan  Times per week: 3-5x  Times per day: Daily  Current Treatment Recommendations: Mik Frey Mobility Training,Transfer Training,Endurance Training,Gait Training,Stair training,Home Exercise [employer] Devices  Type of devices: All fall risk precautions in place,Call light within reach,Chair alarm in place,Gait belt,Patient at risk for falls,Left in chair,Nurse notified  Restraints  Initially in place: No    G-Code       OutComes Score                                                  AM-PAC Score  AM-PAC Inpatient Mobility Raw Score : 15 (01/31/22 1308)  AM-PAC Inpatient T-Scale Score : 39.45 (01/31/22 1308)  Mobility Inpatient CMS 0-100% Score: 57.7 (01/31/22 1308)  Mobility Inpatient CMS G-Code Modifier : CK (01/31/22 1308)          Goals  Short term goals  Time Frame for Short term goals:  To be met prior to discharge  Short term goal 1: Pt will complete bed mobility with mod I  Short term goal 2: Pt will complete sit to/from stand with SBA  Short term goal 3: Pt will ambulate 50 ft with LRAD and min A       Therapy Time   Individual Concurrent Group Co-treatment   Time In [address]         Time Out 0955         Minutes 62         Timed Code Treatment Minutes: [address], [place], DPT, [id number]  Reddy Brooks, PT

## 2022-01-31 NOTE — PROGRESS NOTES
Progress Note - Dr. Armando Camacho - Internal Medicine  PCP: Oliver Herndon, [address] [facility] [address] [phone]    Hospital Day: 1  Code Status: Full Code  Current Diet: ADULT DIET; Regular        CC: follow up on medical issues    Subjective:   Negar Wong is a 80 y.o. female. Pt seen and examined  Chart reviewed since last visit, labs and imaging below      Doing ok  No new issues  Still on 3L  covid test pending    She denies chest pain, denies shortness of breath, denies nausea,  denies emesis. 10 system Review of Systems is reviewed with patient, and pertinent positives are noted in HPI above . Otherwise, Review of systems is negative. I have reviewed the patient's medical and social history in detail and updated the computerized patient record. To recap: She  has a past medical history of Arthritis, CHF (congestive heart failure) ([facility] [address] COPD (chronic obstructive pulmonary disease) ([facility] [address] Dementia ([facility] [address] Depression, and Hypertension. . She  has a past surgical history that includes Hysterectomy. . She  reports that she has quit smoking. She has never used smokeless tobacco. She reports that she does not drink alcohol. .        Active Hospital Problems    Diagnosis Date Noted    Depression [F32. A] 01/30/2022    Dementia (HCC) [F03.90] 01/30/2022    Acute respiratory failure (HCC) [J96.00] 01/30/2022    COVID-19 [U07.1] 01/05/2021    Acute respiratory failure due to COVID-19 (HCC) [U07.1, J96.00] 01/04/2021    HTN (hypertension) [I10] 09/23/2018       Current Facility-Administered Medications: venlafaxine (EFFEXOR XR) extended release capsule 150 mg, 150 mg, Oral, Daily  albuterol sulfate  (90 Base) MCG/ACT inhaler 2 puff, 2 puff, Inhalation, Q6H PRN  budesonide-formoterol (SYMBICORT) 160-4.5 MCG/ACT inhaler 2 puff, 2 puff, Inhalation, BID  gabapentin (NEURONTIN) capsule 100 mg, 100 mg, Oral, TID  traZODone (DESYREL) tablet 50 mg, 50 mg, Oral, Nightly  cetirizine (ZYRTEC) tablet 5 mg, 5 mg, Oral, Daily  0.9 % sodium chloride infusion, , IntraVENous, Continuous  sodium chloride flush 0.9 % injection 5-40 mL, 5-40 mL, IntraVENous, 2 times per day  sodium chloride flush 0.9 % injection 10 mL, 10 mL, IntraVENous, PRN  0.9 % sodium chloride infusion, 25 mL, IntraVENous, PRN  enoxaparin (LOVENOX) injection 40 mg, 40 mg, SubCUTAneous, Daily  ondansetron (ZOFRAN-ODT) disintegrating tablet 4 mg, 4 mg, Oral, Q8H PRN **OR** ondansetron (ZOFRAN) injection 4 mg, 4 mg, IntraVENous, Q6H PRN  magnesium hydroxide (MILK OF MAGNESIA) 400 MG/5ML suspension 30 mL, 30 mL, Oral, Daily PRN  acetaminophen (TYLENOL) tablet 650 mg, 650 mg, Oral, Q6H PRN **OR** acetaminophen (TYLENOL) suppository 650 mg, 650 mg, Rectal, Q6H PRN  cefTRIAXone (ROCEPHIN) 1000 mg in sterile water 10 mL IV syringe, 1,000 mg, IntraVENous, Q24H **AND** azithromycin (ZITHROMAX) tablet 500 mg, 500 mg, Oral, Q24H  hydrALAZINE (APRESOLINE) injection 10 mg, 10 mg, IntraVENous, Q6H PRN  0.9 % sodium chloride bolus, 500 mL, IntraVENous, PRN  potassium chloride (KLOR-CON M) extended release tablet 40 mEq, 40 mEq, Oral, PRN **OR** potassium bicarb-citric acid (EFFER-K) effervescent tablet 40 mEq, 40 mEq, Oral, PRN **OR** potassium chloride 10 mEq/100 mL IVPB (Peripheral Line), 10 mEq, IntraVENous, PRN  dexamethasone (PF) (DECADRON) injection 6 mg, 6 mg, IntraVENous, Q24H  influenza quadrivalent split vaccine (FLUZONE;FLUARIX;FLULAVAL;AFLURIA) injection 0.5 mL, 0.5 mL, IntraMUSCular, Prior to discharge  ipratropium-albuterol (DUONEB) nebulizer solution 1 ampule, 1 ampule, Inhalation, TID         Objective:  /72   Pulse 78   Temp 97.8 °F (36.6 °C) (Axillary)   Resp 16   Ht 4' 11\" (1.499 m)   Wt 192 lb 14.4 oz (87.5 kg)   SpO2 95%   BMI 38.96 kg/m²      Patient Vitals for the past 24 hrs:   BP Temp Temp src Pulse Resp SpO2 Height Weight   01/31/22 0817 107/72 97.8 °F (36.6 °C) Axillary 78 16 95 % -- --   01/31/22 0557 112/67 97.5 °F (36.4 °C) Oral 70 16 94 % -- 192 lb 14.4 oz (87.5 kg)   01/30/22 2318 114/66 97.8 °F (36.6 °C) Oral 77 16 97 % -- --   01/30/22 2038 115/61 98 °F (36.7 °C) Oral 87 16 94 % -- --   01/30/22 2000 (!) 105/47 -- -- 85 13 93 % -- --   01/30/22 1930 (!) 113/59 -- -- 91 12 93 % -- --   01/30/22 1800 (!) 109/54 -- -- 90 15 92 % -- --   01/30/22 1730 (!) 115/55 -- -- 91 15 91 % -- --   01/30/22 1704 -- -- -- 96 12 (!) 87 % -- --   01/30/22 1700 134/62 -- -- 97 11 (!) 89 % -- --   01/30/22 1642 (!) 136/58 98.9 °F (37.2 °C) Oral 101 18 94 % 4' 11\" (1.499 m) 220 lb (99.8 kg)     Patient Vitals for the past 96 hrs (Last 3 readings):   Weight   01/31/22 0557 192 lb 14.4 oz (87.5 kg)   01/30/22 1642 220 lb (99.8 kg)         No intake or output data in the 24 hours ending 01/31/22 0855      Physical Exam:   Vitals as above  General appearance: alert, appears stated age and cooperative    Head: Normocephalic, without obvious abnormality, atraumatic    Lungs: crackles bilat  Heart: regular rate and rhythm, S1, S2 normal, no murmur   Abdomen: soft, non-tender; bowel sounds normal; no masses, no organomegaly    Extremities: extremities normal, atraumatic, no cyanosis, no edema      Labs:  Lab Results   Component Value Date    WBC 5.5 01/31/2022    HGB 13.6 01/31/2022    HCT 41.9 01/31/2022     01/31/2022    CHOL 203 (H) 08/23/2018    TRIG 144 08/23/2018    HDL 63 (H) 08/23/2018    ALT 6 (L) 01/31/2022    AST 12 (L) 01/31/2022     (L) 01/31/2022    K 4.4 01/31/2022    CL 99 01/31/2022    CREATININE 0.9 01/31/2022    BUN 23 (H) 01/31/2022    CO2 26 01/31/2022    TSH 3.35 08/23/2018    INR 1.05 01/03/2021    LABMICR YES 01/03/2021     Lab Results   Component Value Date    CKTOTAL 97 01/03/2021    TROPONINI <0.01 01/30/2022       Recent Imaging Results are Reviewed:  XR CHEST PORTABLE    Result Date: 1/30/2022  EXAMINATION: ONE XRAY VIEW OF THE CHEST 1/30/2022 4:46 pm COMPARISON: 01/04/2021.  HISTORY: ORDERING SYSTEM PROVIDED HISTORY: SOB TECHNOLOGIST PROVIDED HISTORY: Reason for exam:->SOB Reason for Exam: Concern For COVID-19 (from home via [facility] c/o flu-like symptoms x 2 weeks with increased fever and shortness of breath today.) FINDINGS: Minimal left basilar likely scarring versus atelectasis. No focal consolidations, pleural effusions or pulmonary edema. No pneumothoraces. Cardiac and mediastinal silhouettes are within normal limits. No acute bony abnormality. No acute abnormality. Lab Results   Component Value Date    GLUCOSE 181 01/31/2022     Lab Results   Component Value Date    POCGLU 126 09/26/2018     /72   Pulse 78   Temp 97.8 °F (36.6 °C) (Axillary)   Resp 16   Ht 4' 11\" (1.499 m)   Wt 192 lb 14.4 oz (87.5 kg)   SpO2 95%   BMI 38.96 kg/m²     Assessment and Plan:  Principal Problem:    Acute respiratory failure due to COVID-19 [facility]) -Established problem. Stable. Likely covid  Plan: cont o2, cont iv steroids  Active Problems:    HTN (hypertension) -Established problem. Stable. 107/72  Plan: Continue present orders/plan. Depression -Established problem. Stable. Plan: Continue present orders/plan. Dementia ([address] -Established problem. Stable. Plan: Continue present orders/plan. Acute respiratory failure ([address] -Established problem. Stable. Still on 3L  Plan: cont steroids.  Stop abx as procal low      (Please note that portions of this note were completed with a voice recognition program.  Efforts were made to edit the dictations but occasionally words are mis-transcribed.)        Luciano Bunn MD  1/31/2022

## 2022-01-31 NOTE — CARE COORDINATION
[facility] Wound Ostomy Continence Nurse  Consult Note       NAME:  Theo Burger  MEDICAL RECORD NUMBER:  [id number]  AGE: 80 y.o. GENDER: female  : 1931  TODAY'S DATE:  2022    Subjective   Reason for WOCN Evaluation and Assessment: stage 3 buttocks      Theo Burger is a 80 y.o. female referred by:   [x] Physician  [x] Nursing  [] Other:     Wound Identification:  Wound Type: pressure  Contributing Factors: incontinence of urine    Wound History: present on admission  Current Wound Care Treatment:  Zinc paste, purewick    Patient Goal of Care:  [x] Wound Healing  [] Odor Control  [] Palliative Care  [] Pain Control   [] Other:         PAST MEDICAL HISTORY        Diagnosis Date    Arthritis     [facility])     COPD (chronic obstructive pulmonary disease) ([facility] [address]     Dementia ([facility] [address]     Depression     Hypertension        PAST SURGICAL HISTORY    Past Surgical History:   Procedure Laterality Date    HYSTERECTOMY         FAMILY HISTORY    History reviewed. No pertinent family history. SOCIAL HISTORY    Social History     Tobacco Use    Smoking status: Former Smoker    Smokeless tobacco: Never Used    Tobacco comment: 7 years   Vaping Use    Vaping Use: Never used   Substance Use Topics    Alcohol use: No    Drug use: Not on file       ALLERGIES    Allergies   Allergen Reactions    Pcn [Penicillins]        MEDICATIONS    No current facility-administered medications on file prior to encounter. Current Outpatient Medications on File Prior to Encounter   Medication Sig Dispense Refill    cetirizine (ZYRTEC) 10 MG tablet Take 10 mg by mouth daily      venlafaxine (EFFEXOR XR) 75 MG extended release capsule Take 150 mg by mouth daily 75mg x 2      donepezil (ARICEPT) 5 MG tablet Take 5 mg by mouth nightly      gabapentin (NEURONTIN) 100 MG capsule Take 100 mg by mouth 3 times daily.       traZODone (DESYREL) 50 MG tablet Take 50 mg by mouth nightly      albuterol sulfate HFA (PROVENTIL HFA) 108 (90 Base) MCG/ACT inhaler Inhale 2 puffs into the lungs every 6 hours as needed for Wheezing 1 Inhaler 3    budesonide-formoterol (SYMBICORT) 160-4.5 MCG/ACT AERO Inhale 2 puffs into the lungs 2 times daily 1 Inhaler 3    loratadine (CLARITIN) 10 MG tablet Take 10 mg by mouth daily      potassium chloride (KLOR-CON) 20 MEQ packet Take 20 mEq by mouth daily      spironolactone (ALDACTONE) 25 MG tablet Take 0.5 tablets by mouth daily 30 tablet 3    furosemide (LASIX) 40 MG tablet Take 40 mg by mouth as needed       digoxin (LANOXIN) 125 MCG tablet Take 125 mcg by mouth daily         Objective    /70   Pulse 84   Temp 98.4 °F (36.9 °C) (Axillary)   Resp 18   Ht 4' 11\" (1.499 m)   Wt 192 lb 14.4 oz (87.5 kg)   SpO2 90%   BMI 38.96 kg/m²     LABS:  WBC:    Lab Results   Component Value Date    WBC 5.5 01/31/2022     H/H:    Lab Results   Component Value Date    HGB 13.6 01/31/2022    HCT 41.9 01/31/2022     PTT:    Lab Results   Component Value Date    APTT 33.1 01/03/2021   [APTT}  PT/INR:    Lab Results   Component Value Date    PROTIME 12.2 01/03/2021    INR 1.05 01/03/2021     HgBA1c:  No results found for: LABA1C    Assessment   Oskar Risk Score: Oskar Scale Score: 15    Patient Active Problem List   Diagnosis Code    Acute diastolic heart failure (HCC) I50.31    HTN (hypertension) I10    Hyponatremia E87.1    PRABHA (acute kidney injury) ([facility] [address] N17.9    Hypokalemia E87.6    Suspected COVID-19 virus infection Z20.822    Acute respiratory failure due to COVID-19 (HCC) U07.1, J96.00    COVID-19 U07.1    Depression F32. A    Dementia ([address] F03.90    Acute respiratory failure ([facility] [address] J96.00       Measurements:  Wound 01/07/21 Coccyx Left; Inner (Active)   Number of days: 389       Wound 01/07/21 Perineum Inner (Active)   Number of days: 389       Wound 01/07/21 Vagina Inner;Left (Active)   Number of days: 389   Patient from home, positive for covid-19.  Patient agreeable to visit. Patient has redness that is blanchable to bilateral buttocks with excoriation. Skin erosion noted. Purewick in place. Spoke to nurse Rosa M Kaur, patient does have urinary incontinence. No pressure injuries noted. Will ask for calmoseptine for buttocks, important to keep skin dry, continue use of purewick. Response to treatment:  Well tolerated by patient. Pain Assessment:  Severity:  0 / 10  Quality of pain: N/A  Wound Pain Timing/Severity: none  Premedicated: No    Plan   Plan of Care: Moisture associated skin damage. Provide orquidea-care after each episode of incontinence  Apply calmoseptine twice daily and as needed to bilateral buttocks. Use purewick external female urinary catheter, change every shift of if soiled. Specialty Bed Required : No   [] Low Air Loss   [] Pressure Redistribution  [] Fluid Immersion  [] Bariatric  [] Total Pressure Relief  [] Other:     Current Diet: ADULT DIET;  Regular  Dietician consult:  No    Discharge Plan:  Placement for patient upon discharge: home with support    Patient appropriate for Outpatient [address]: No    Referrals:  [x]   [x] [facility]  [] Supplies  [] Other    Patient/Caregiver Teaching:  Level of patient/caregiver understanding able to:   [x] Indicates understanding       [x] Needs reinforcement  [] Unsuccessful      [] Verbal Understanding  [] Demonstrated understanding       [] No evidence of learning  [] Refused teaching         [] N/A       Electronically signed by  RUMA Poe, RN  Wound/Ostomy Care on 1/31/2022 at 5:47 PM

## 2022-01-31 NOTE — PROGRESS NOTES
01/30/22 2053   RT Protocol   History Pulmonary Disease 2   Respiratory pattern 0   Breath sounds 6   Cough 0   Indications for Bronchodilator Therapy Wheezing associated with pulm disorder   Bronchodilator Assessment Score 8

## 2022-01-31 NOTE — PROGRESS NOTES
4 Eyes Skin Assessment     NAME:  Rene Rojas  YOB: 1931  MEDICAL RECORD NUMBER:  [id number]    The patient is being assess for  Admission    I agree that 2 RN's have performed a thorough Head to Toe Skin Assessment on the patient. ALL assessment sites listed below have been assessed. Areas assessed by both nurses:    Head, Face, Ears, Shoulders, Back, Chest, Arms, Elbows, Hands, Sacrum. Buttock, Coccyx, Ischium and Legs. Feet and Heels        Does the Patient have a Wound? Yes wound(s) were present on assessment.  LDA wound assessment was Initiated and completed        Oskar Prevention initiated:  Yes   Wound Care Orders initiated:  Yes    Pressure Injury (Stage 3,4, Unstageable, DTI, NWPT, and Complex wounds) if present place consult order under [de-identified] Yes    New and Established Ostomies if present place consult order under : No      Nurse 1 eSignature: Electronically signed by Ferdinand Hatfield RN on 1/31/22 at 1:56 AM EST    **SHARE this note so that the co-signing nurse is able to place an eSignature**    Nurse 2 eSignature: Electronically signed by Augusto Gerardo RN on 1/31/22 at 4:54 AM EST

## 2022-01-31 NOTE — PROGRESS NOTES
Occupational Therapy   Occupational Therapy Initial Assessment  Date: 2022   Patient Name: Yarely Corbin  MRN: [id number]     : 1931    Date of Service: 2022    Discharge Recommendations: Yarely Corbin scored a 16/24 on the AM-PAC ADL Inpatient form. Current research shows that an AM-PAC score of 17 or less is typically not associated with a discharge to the patient's home setting. Based on the patient's AM-PAC score and their current ADL deficits, it is recommended that the patient have 3-5 sessions per week of Occupational Therapy at d/c to increase the patient's independence. Please see assessment section for further patient specific details. If patient discharges prior to next session this note will serve as a discharge summary. Please see below for the latest assessment towards goals. 3-5 sessions per week,S Level 3  OT Equipment Recommendations  Equipment Needed: No    Assessment   Performance deficits / Impairments: Decreased functional mobility ; Decreased ADL status; Decreased strength;Decreased safe awareness;Decreased endurance;Decreased balance  Assessment: Pt presents with the above deficits. Recommended skilled OT services to address funcitonal mobility, strength, endurance, balance, and safety awareness when completing ADL activities. Treatment Diagnosis: multiple deficits secondary to mild hypoxia, cough and other \"flu-like\" symptoms  Prognosis: Good  Decision Making: Low Complexity  OT Education: OT Role;Plan of Care  Patient Education: Pt is not an independent learner  Barriers to Learning: memory  REQUIRES OT FOLLOW UP: Yes  Activity Tolerance  Activity Tolerance: Patient Tolerated treatment well;Patient limited by fatigue  Safety Devices  Safety Devices in place: Yes  Type of devices: All fall risk precautions in place;Call light within reach; Chair alarm in place;Gait belt;Patient at risk for falls; Left in chair;Nurse notified  Restraints  Initially in place: No Patient Diagnosis(es): The primary encounter diagnosis was Suspected COVID-19 virus infection. A diagnosis of Acute respiratory failure with hypoxia (HCC) was also pertinent to this visit. has a past medical history of Arthritis, CHF (congestive heart failure) ([facility] [address] COPD (chronic obstructive pulmonary disease) ([facility] [address] Dementia ([facility] [address] Depression, and Hypertension. has a past surgical history that includes Hysterectomy. Treatment Diagnosis: multiple deficits secondary to mild hypoxia, cough and other \"flu-like\" symptoms      Restrictions  Restrictions/Precautions  Restrictions/Precautions: Fall Risk,Isolation (high fall risk, droplet + isolation)  Required Braces or Orthoses?: No  Position Activity Restriction  Other position/activity restrictions: Mago Barber is a 80 y.o. female who presents for evaluation of \"flulike\"symptoms x2 weeks with cough body aches and headache. Patient has history of dementia and is poor historian. Information mainly obtained through family and EMS. No known fevers or chills. EMS reports mild hypoxia at 90% on room air with no prior oxygen requirement. She was on 4 L upon arrival to the ED. Patient does report some shortness of breath. No chest pain. No abdominal pain nausea vomiting or diarrhea. No known sick contacts with similar symptoms. She is not vaccinated against COVID-19. She has no other complaints or concerns at this time    Subjective   General  Chart Reviewed: Yes  Patient assessed for rehabilitation services?: Yes  Additional Pertinent Hx: Arthritis, CHF, COPD, HTN, demenita  Family / Caregiver Present: No  Diagnosis: Acute respiratory failure  Subjective  Subjective: Pt supine in bed upon arrival with HOB elevated. Pt agreeable to OT eval. Denies pain.  Pt on 2L of SpO2 at rest.  Patient Currently in Pain: Denies  Vital Signs  Temp: 98.5 °F (36.9 °C)  Temp Source: Axillary  Pulse: 92  Heart Rate Source: Monitor  Resp: 16  BP: (!) 109/53  BP Location: Left upper arm  MAP (mmHg): 72  Patient Position: Sitting  Level of Consciousness: Alert (0)  MEWS Score: 1  Patient Currently in Pain: Denies  Oxygen Therapy  SpO2: 93 %  Pulse Oximeter Device Mode: Continuous  Pulse Oximeter Device Location: Finger  O2 Device: Nasal cannula  O2 Flow Rate (L/min): 3 L/min  Social/Functional History  Social/Functional History  Lives With: Other (comment) (grandson)  Type of Home: House  Home Layout: One level  Home Access: Level entry  Bathroom Shower/Tub: Tub/Shower unit,Shower chair with back  Bathroom Toilet: Standard  Bathroom Accessibility: Walker accessible  Home Equipment: Rolling walker  Receives Help From: Family  ADL Assistance: Independent  Homemaking Assistance: Needs assistance (not responsible for any homemaking activities)  Ambulation Assistance: Independent  Transfer Assistance: Independent  Occupation: Retired  Type of occupation: hairdresser  Additional Comments: no falls within the last 6 months       Objective   Vision: Impaired  Hearing: Exceptions to [facility]  Hearing Exceptions: Hard of hearing/hearing concerns    Orientation  Overall Orientation Status: Impaired  Orientation Level: Oriented to place;Oriented to person (ID'd the year but not the month, could not ID situation)  Observation/Palpation  Posture: Good  Observation: Pt on 2L SpO2 at rest. After minimal-moderate exertion (short distance ambulation and seated ADLs), Pt O2 at 90-92%. Balance  Sitting Balance: Supervision  Standing Balance: Contact guard assistance  Standing Balance  Time: ~1 minute  Activity: standing to RW from toilet to don brief  Comment: Pt stood to RW with CGA to pull up brief from knee level. Pt unable to don brief initially d/t brief being caught between toilet and Pt's legs. Pt required cueing to take a small step forward.   Functional Mobility  Functional - Mobility Device: Rolling Walker  Activity: To/from bathroom  Assist Level: Minimal assistance  Toilet Transfers  Toilet - Technique: Ambulating  Toilet Transfer: Minimal assistance (required cueing for hand placement for transfer)  ADL  UE Bathing: Stand by assistance (Pt seated in recliner for UE bathing. SBA with mod cueing for sequencing)  LE Dressing: Stand by assistance (Pt seated in recliner to don/doff socks. Pt donned pull-up from knee level while standing in the restroom)  Toileting: Supervision (Pt stated she felt the need to use the restroom, however was not successful.)  Tone RUE  RUE Tone: Normotonic  Tone LUE  LUE Tone: Normotonic  Coordination  Movements Are Fluid And Coordinated: Yes     Bed mobility  Supine to Sit: Stand by assistance  Transfers  Sit to stand: Contact guard assistance (with RW)  Stand to sit: Contact guard assistance (with RW)     Cognition  Overall Cognitive Status: Exceptions  Arousal/Alertness: Appropriate responses to stimuli  Following Commands:  Follows all commands without difficulty  Attention Span: Appears intact  Memory: Decreased recall of biographical Information;Decreased recall of recent events;Decreased short term memory  Safety Judgement: Decreased awareness of need for assistance;Decreased awareness of need for safety  Problem Solving: Assistance required to generate solutions;Assistance required to correct errors made  Insights: Decreased awareness of deficits  Initiation: Requires cues for some  Sequencing: Requires cues for some  Perception  Overall Perceptual Status: WFL     Sensation  Overall Sensation Status: WNL        LUE AROM (degrees)  LUE AROM : WFL  Left Hand AROM (degrees)  Left Hand AROM: WFL  RUE AROM (degrees)  RUE AROM : WFL  Right Hand AROM (degrees)  Right Hand AROM: WFL  LUE Strength  Gross LUE Strength: WFL  RUE Strength  Gross RUE Strength: WFL              Plan   Plan  Times per week: 3-5x  Times per day: Daily  Current Treatment Recommendations: Becca Madrigal Mobility Training,Endurance Training,Safety Education & Training,Self-Care / ADL      AM-PAC Score        AM-PAC Inpatient Daily Activity Raw Score: 16 (01/31/22 1216)  AM-PAC Inpatient ADL T-Scale Score : 35.96 (01/31/22 1216)  ADL Inpatient CMS 0-100% Score: 53.32 (01/31/22 1216)  ADL Inpatient CMS G-Code Modifier : CK (01/31/22 1216)    Goals  Short term goals  Time Frame for Short term goals: discharge  Short term goal 1: Supervision for sit-to-stand and stand-to-sit transfers  Short term goal 2: SBA for toileting  Short term goal 3: SBA with RW for functional mobility with ADL activities  Short term goal 4: Supervision for seated ADL activities  Short term goal 5: SBA while standing to RW for ADL activities for ~5 minutes  Patient Goals   Patient goals : return home       Therapy Time   Individual Concurrent Group Co-treatment   Time In [phone]         Time Out 0955         Minutes 62           Timed Code Treatment Minutes:   47    Total Treatment Minutes:  Kristian Veras [address], S/OT  Tuan Almaraz OT  I have reviewed and agree with the assessments and DC recommendation the above student's note.  Tuan Almaraz OTR/L [id number]

## 2022-01-31 NOTE — RT PROTOCOL NOTE
RT Inhaler-Nebulizer Bronchodilator Protocol Note    There is a bronchodilator order in the chart from a provider indicating to follow the RT Bronchodilator Protocol and there is an Initiate RT Inhaler-Nebulizer Bronchodilator Protocol order as well (see protocol at bottom of note). CXR Findings:  XR CHEST PORTABLE    Result Date: 1/30/2022  No acute abnormality. The findings from the last RT Protocol Assessment were as follows:   History Pulmonary Disease: Chronic pulmonary disease  Respiratory Pattern: Regular pattern and RR 12-20 bpm  Breath Sounds: Inspiratory and expiratory or bilateral wheezing and/or rhonchi  Cough: Strong, spontaneous, non-productive  Indication for Bronchodilator Therapy: Wheezing associated with pulm disorder  Bronchodilator Assessment Score: 8    Aerosolized bronchodilator medication orders have been revised according to the RT Inhaler-Nebulizer Bronchodilator Protocol below. Respiratory Therapist to perform RT Therapy Protocol Assessment initially then follow the protocol. Repeat RT Therapy Protocol Assessment PRN for score 0-3 or on second treatment, BID, and PRN for scores above 3. No Indications - adjust the frequency to every 6 hours PRN wheezing or bronchospasm, if no treatments needed after 48 hours then discontinue using Per Protocol order mode. If indication present, adjust the RT bronchodilator orders based on the Bronchodilator Assessment Score as indicated below. Use Inhaler orders unless patient has one or more of the following: on home nebulizer, not able to hold breath for 10 seconds, is not alert and oriented, cannot activate and use MDI correctly, or respiratory rate 25 breaths per minute or more, then use the equivalent nebulizer order(s) with same Frequency and PRN reasons based on the score. If a patient is on this medication at home then do not decrease Frequency below that used at home.     0-3 - enter or revise RT bronchodilator order(s) to equivalent RT Bronchodilator order with Frequency of every 4 hours PRN for wheezing or increased work of breathing using Per Protocol order mode. 4-6 - enter or revise RT Bronchodilator order(s) to two equivalent RT bronchodilator orders with one order with BID Frequency and one order with Frequency of every 4 hours PRN wheezing or increased work of breathing using Per Protocol order mode. 7-10 - enter or revise RT Bronchodilator order(s) to two equivalent RT bronchodilator orders with one order with TID Frequency and one order with Frequency of every 4 hours PRN wheezing or increased work of breathing using Per Protocol order mode. 11-13 - enter or revise RT Bronchodilator order(s) to one equivalent RT bronchodilator order with QID Frequency and an Albuterol order with Frequency of every 4 hours PRN wheezing or increased work of breathing using Per Protocol order mode. Greater than 13 - enter or revise RT Bronchodilator order(s) to one equivalent RT bronchodilator order with every 4 hours Frequency and an Albuterol order with Frequency of every 2 hours PRN wheezing or increased work of breathing using Per Protocol order mode. RT to enter RT Home Evaluation for COPD & MDI Assessment order using Per Protocol order mode.     Electronically signed by Gloria Huber RCP on 1/30/2022 at 8:54 PM

## 2022-01-31 NOTE — PROGRESS NOTES
Speech Language Pathology  Aspiration Screen    Name: Yarely Corbin  : 1931  Medical Diagnosis: Acute respiratory failure ([facility] [address] [J96.00]  Acute respiratory failure with hypoxia ([facility] [address] [J96.01]  Suspected COVID-19 virus infection [Z20.822]    Swallow screen to rule out aspiration completed per pneumonia protocol. Patient demonstrates some high risk indicators for potential dysphagia / aspiration per swallow screen. RECOMMEND: Clinical Swallow Evaluation at bedside to assess swallowing function, rule out aspiration, and determine appropriate diet level.      Magui Willis, [facility]  Speech Language Pathologist

## 2022-02-01 PROBLEM — N39.0 UTI (URINARY TRACT INFECTION): Status: ACTIVE | Noted: 2022-01-01

## 2022-02-01 NOTE — PROGRESS NOTES
Physical Therapy  Facility/Department: [address] ONCOLOGY  Daily Treatment Note  NAME: Flavio Beasley  : 1931  MRN: [id number]    Date of Service: 2022    Discharge Recommendations: Flavio Beasley scored a 15/24 on the AM-PAC short mobility form. Current research shows that an AM-PAC score of 17 or less is typically not associated with a discharge to the patient's home setting. Based on the patient's AM-PAC score and their current functional mobility deficits, it is recommended that the patient have 3-5 sessions per week of Physical Therapy at d/c to increase the patient's independence. Please see assessment section for further patient specific details. If patient discharges prior to next session this note will serve as a discharge summary. Please see below for the latest assessment towards goals. PT Equipment Recommendations  Equipment Needed: No    Assessment   Body structures, Functions, Activity limitations: Decreased functional mobility ; Decreased ADL status; Decreased ROM; Decreased strength;Decreased endurance;Decreased balance;Decreased posture;Decreased safe awareness;Decreased cognition  Assessment: Patient is CGA for bed mobility due to impulsiveness on this date. She is able to stand from EOB with [place] and requires modA to transfer from bed to chair. She has a decreased understanding of her need for assistance and has impaired understanding of her situation. Patient too fatigued to attempt ambulation during session. She would benefit from continued skilled rehab. Prognosis: Good  PT Education: Goals;PT Role;Plan of Care;Transfer Training  Patient Education: Patient verbalized understanding-- strongly recommend reinforcement  REQUIRES PT FOLLOW UP: Yes  Activity Tolerance  Activity Tolerance: Patient Tolerated treatment well;Patient limited by cognitive status; Patient limited by endurance     Patient Diagnosis(es): The primary encounter diagnosis was Suspected COVID-19 virus infection.  A diagnosis of Acute respiratory failure with hypoxia (HCC) was also pertinent to this visit. has a past medical history of Arthritis, CHF (congestive heart failure) ([facility] [address] COPD (chronic obstructive pulmonary disease) ([facility] [address] Dementia ([facility] [address] Depression, and Hypertension. has a past surgical history that includes Hysterectomy. Restrictions  Restrictions/Precautions  Restrictions/Precautions: Fall Risk,Isolation (high fall risk; COVID)  Required Braces or Orthoses?: No  Position Activity Restriction  Other position/activity restrictions: Lincoln Bradley is a 80 y.o. female who presents for evaluation of \"flulike\"symptoms x2 weeks with cough body aches and headache. Patient has history of dementia and is poor historian. Information mainly obtained through family and EMS. No known fevers or chills. EMS reports mild hypoxia at 90% on room air with no prior oxygen requirement. She was on 4 L upon arrival to the ED. Patient does report some shortness of breath. No chest pain. No abdominal pain nausea vomiting or diarrhea. No known sick contacts with similar symptoms. She is not vaccinated against COVID-19. She has no other complaints or concerns at this time     Subjective   General  Chart Reviewed: Yes  Response To Previous Treatment: Patient with no complaints from previous session. Family / Caregiver Present: No  Subjective  Subjective: Pt agreeable to PT/OT eval.  General Comment  Comments: Pt supine in bed upon arrival.  Pain Screening  Patient Currently in Pain: Denies  Vital Signs  Patient Currently in Pain: Denies       Orientation  Orientation  Overall Orientation Status: Impaired  Orientation Level: Oriented to person;Oriented to place; Disoriented to situation;Disoriented to time     Cognition   Cognition  Overall Cognitive Status: Exceptions  Arousal/Alertness: Appropriate responses to stimuli  Following Commands:  Follows all commands without difficulty  Attention Span: Appears intact  Memory: Devices  Type of devices: All fall risk precautions in place,Call light within reach,Chair alarm in place,Gait belt,Patient at risk for falls,Left in chair,Nurse notified  Restraints  Initially in place: No     Therapy Time   Individual Concurrent Group Co-treatment   Time In 1303         Time Out 1331         Minutes 28         Timed Code Treatment Minutes: Yoni Jimenez [address] SPT    Yamileth Thomas, PT   I agree with the above note. PT directly observed the SPT with the patient.   Augie Coppola DPT [id number]

## 2022-02-02 NOTE — PROGRESS NOTES
Physical Therapy  Facility/Department: [address] ONCOLOGY  Daily Treatment Note  NAME: Ramya Grey  : 1931  MRN: [id number]    Date of Service: 2022    Discharge Recommendations: Ramya Grey scored a 15/24 on the AM-PAC short mobility form. Current research shows that an AM-PAC score of 17 or less is typically not associated with a discharge to the patient's home setting. Based on the patient's AM-PAC score and their current functional mobility deficits, it is recommended that the patient have 3-5 sessions per week of Physical Therapy at d/c to increase the patient's independence. Please see assessment section for further patient specific details. If patient discharges prior to next session this note will serve as a discharge summary. Please see below for the latest assessment towards goals. PT Equipment Recommendations  Equipment Needed: No    Assessment   Body structures, Functions, Activity limitations: Decreased functional mobility ; Decreased ADL status; Decreased ROM; Decreased strength;Decreased endurance;Decreased balance;Decreased posture;Decreased safe awareness;Decreased cognition  Assessment: Patient is CGA for all bed mobility on this date and transfers with [place]. She was able to tolerate short ambulation, but fatigued quickly, stating that her legs felt weak. She continues to be limited by her cognition and is unmotivated to walk long distance as she will have family avaliable to assist as needed. Continue to progress as tolerated. Prognosis: Good  PT Education: Goals;PT Role;Plan of Care;Transfer Training;Home Exercise Program;Precautions;Gait Training  Patient Education: Patient verbalized understanding-- strongly recommend reinforcement  Barriers to Learning: hearing; cognition  REQUIRES PT FOLLOW UP: Yes  Activity Tolerance  Activity Tolerance: Patient Tolerated treatment well;Patient limited by cognitive status; Patient limited by endurance  Activity Tolerance: Patient on 3L/min O2. SpO2 dropped to 74% while performing seated exercises in recliner. Recovery to 95% in ~3 minutes with frequent cues for PLB. Patient Diagnosis(es): The primary encounter diagnosis was Suspected COVID-19 virus infection. A diagnosis of Acute respiratory failure with hypoxia (HCC) was also pertinent to this visit. has a past medical history of Arthritis, CHF (congestive heart failure) ([facility] COPD (chronic obstructive pulmonary disease) ([facility] [address] [facility] Depression, and Hypertension. has a past surgical history that includes Hysterectomy. Restrictions  Restrictions/Precautions  Restrictions/Precautions: Fall Risk,Isolation (high fall risk; COVID positive)  Required Braces or Orthoses?: No  Position Activity Restriction  Other position/activity restrictions: Robles Stein is a 80 y.o. female who presents for evaluation of \"flulike\"symptoms x2 weeks with cough body aches and headache. Patient has history of dementia and is poor historian. Information mainly obtained through family and EMS. No known fevers or chills. EMS reports mild hypoxia at 90% on room air with no prior oxygen requirement. She was on 4 L upon arrival to the ED. Patient does report some shortness of breath. No chest pain. No abdominal pain nausea vomiting or diarrhea. No known sick contacts with similar symptoms. She is not vaccinated against COVID-19. She has no other complaints or concerns at this time     Subjective   General  Chart Reviewed: Yes  Response To Previous Treatment: Patient with no complaints from previous session. Family / Caregiver Present: No  Subjective  Subjective: Patient agreeable to therapy.   General Comment  Comments: Patient in bed being changed by nursing upon arrivial.  Pain Screening  Patient Currently in Pain: Denies  Vital Signs  Patient Currently in Pain: Denies       Orientation  Orientation  Overall Orientation Status: Impaired  Orientation Level: Oriented to person;Oriented to place; Disoriented to situation;Disoriented to time     Cognition   Cognition  Overall Cognitive Status: Exceptions  Arousal/Alertness: Appropriate responses to stimuli  Following Commands: Follows all commands without difficulty  Attention Span: Appears intact  Memory: Decreased recall of biographical Information;Decreased recall of recent events;Decreased short term memory  Safety Judgement: Decreased awareness of need for assistance;Decreased awareness of need for safety  Problem Solving: Assistance required to generate solutions;Assistance required to correct errors made  Insights: Decreased awareness of deficits  Initiation: Requires cues for some  Sequencing: Requires cues for some     Objective   Bed mobility  Supine to Sit: Contact guard assistance  Scooting: Contact guard assistance     Transfers (with RW)  Sit to Stand: Minimal Assistance  Stand to sit: Contact guard assistance  Bed to Chair: Minimal assistance    Ambulation  Ambulation?: Yes  Ambulation 1  Surface: level tile  Device: Rolling Walker  Assistance: Minimal assistance  Quality of Gait: patient ambulates with increased anterior lean over RW  Gait Deviations: Slow Padmini; Increased GILDARDO; Decreased step length  Distance: ~8 ft + ~6 ft  Stairs/Curb  Stairs?: No     Balance  Posture: Fair  Sitting - Static: Good;-  Sitting - Dynamic: Fair;+  Standing - Static: Fair  Standing - Dynamic: Fair  Exercises  Hip Flexion: seated marches x10 B  Knee Long Arc Quad: x10 B  Ankle Pumps: x20 B     Patient stood for a ~5 minute rest break following 8 foot ambulation. Verbal and tactile cues were provided for patient to stand close to RW, decrease anterior trunk lean, and open chest to improve breathing ability. Patient required frequent verbal cues to breath through her nose instead of her mouth.     AM-PAC Score  AM-PAC Inpatient Mobility Raw Score : 15 (02/02/22 1210)  AM-PAC Inpatient T-Scale Score : 39.45 (02/02/22 1210)  Mobility Inpatient CMS 0-100% Score: 57.7 (02/02/22 1210)  Mobility Inpatient CMS G-Code Modifier : CK (02/02/22 1210)    Goals  Short term goals  Time Frame for Short term goals: To be met prior to discharge  Short term goal 1: Pt will complete bed mobility with mod I  Short term goal 2: Pt will complete sit to/from stand with SBA  Short term goal 3: Pt will ambulate 50 ft with LRAD and min A   No goals met this session. Plan    Plan  Times per week: 3-5x  Times per day: Daily  Current Treatment Recommendations: Strengthening,ROM,Balance Training,Functional Mobility Training,Transfer Training,Endurance Training,Gait Training,Stair training,Home Exercise Program,Safety Education & Training,Patient/Caregiver Education & Training  Safety Devices  Type of devices:  All fall risk precautions in place,Call light within reach,Chair alarm in place,Gait belt,Patient at risk for falls,Left in chair,Nurse notified  Restraints  Initially in place: No     Therapy Time   Individual Concurrent Group Co-treatment   Time In 1128         Time Out 1158         Minutes 30         Timed Code Treatment Minutes: [address] [facility], [facility]    Ar Huggins, PT    This evaluation/treatment was observed and supervised by Ar Huggins, [facility]

## 2022-02-02 NOTE — PROGRESS NOTES
Progress Note - Dr. Clarissa Medina - Internal Medicine  PCP: Adamaris Herndon, [address] / [facility] [address] Vashti Sarmadia 1 Day: 3  Code Status: Full Code  Current Diet: ADULT DIET; Regular        CC: follow up on medical issues    Subjective:   Nichole Talavera is a 80 y.o. female. Pt seen and examined  Chart reviewed since last visit, labs and imaging below        Doing ok  A bit more alert  U/a pos for uti - Started on rocephin  on 2L o2, covid +    She denies chest pain, denies shortness of breath, denies nausea,  denies emesis. 10 system Review of Systems is reviewed with patient, and pertinent positives are noted in HPI above . Otherwise, Review of systems is negative. I have reviewed the patient's medical and social history in detail and updated the computerized patient record. To recap: She  has a past medical history of Arthritis, CHF (congestive heart failure) ([facility] [address] COPD (chronic obstructive pulmonary disease) ([facility] [address] Dementia ([facility] Depression, and Hypertension. . She  has a past surgical history that includes Hysterectomy. . She  reports that she has quit smoking. She has never used smokeless tobacco. She reports that she does not drink alcohol. .        Active Hospital Problems    Diagnosis Date Noted    UTI (urinary tract infection) [N39.0] 02/01/2022    Depression [F32. A] 01/30/2022    Dementia ([facility] [F03.90] 01/30/2022    Acute respiratory failure (HCC) [J96.00] 01/30/2022    COVID-19 [U07.1] 01/05/2021    Acute respiratory failure due to COVID-19 (HCC) [U07.1, J96.00] 01/04/2021    HTN (hypertension) [I10] 09/23/2018       Current Facility-Administered Medications: cefTRIAXone (ROCEPHIN) 1000 mg in sterile water 10 mL IV syringe, 1,000 mg, IntraVENous, Q24H  mineral oil-hydrophilic petrolatum (AQUAPHOR) ointment, , Topical, BID PRN  enoxaparin (LOVENOX) injection 40 mg, 40 mg, SubCUTAneous, BID  baricitinib (OLUMIANT) tablet 2 mg, 2 mg, Oral, Daily  venlafaxine [facility] XR) extended release capsule 150 mg, 150 mg, Oral, Daily  albuterol sulfate  (90 Base) MCG/ACT inhaler 2 puff, 2 puff, Inhalation, Q6H PRN  budesonide-formoterol (SYMBICORT) 160-4.5 MCG/ACT inhaler 2 puff, 2 puff, Inhalation, BID  gabapentin (NEURONTIN) capsule 100 mg, 100 mg, Oral, TID  traZODone (DESYREL) tablet 50 mg, 50 mg, Oral, Nightly  cetirizine (ZYRTEC) tablet 5 mg, 5 mg, Oral, Daily  0.9 % sodium chloride infusion, , IntraVENous, Continuous  sodium chloride flush 0.9 % injection 5-40 mL, 5-40 mL, IntraVENous, 2 times per day  sodium chloride flush 0.9 % injection 10 mL, 10 mL, IntraVENous, PRN  0.9 % sodium chloride infusion, 25 mL, IntraVENous, PRN  ondansetron (ZOFRAN-ODT) disintegrating tablet 4 mg, 4 mg, Oral, Q8H PRN **OR** ondansetron (ZOFRAN) injection 4 mg, 4 mg, IntraVENous, Q6H PRN  magnesium hydroxide (MILK OF MAGNESIA) 400 MG/5ML suspension 30 mL, 30 mL, Oral, Daily PRN  acetaminophen (TYLENOL) tablet 650 mg, 650 mg, Oral, Q6H PRN **OR** acetaminophen (TYLENOL) suppository 650 mg, 650 mg, Rectal, Q6H PRN  hydrALAZINE (APRESOLINE) injection 10 mg, 10 mg, IntraVENous, Q6H PRN  0.9 % sodium chloride bolus, 500 mL, IntraVENous, PRN  potassium chloride (KLOR-CON M) extended release tablet 40 mEq, 40 mEq, Oral, PRN **OR** potassium bicarb-citric acid (EFFER-K) effervescent tablet 40 mEq, 40 mEq, Oral, PRN **OR** potassium chloride 10 mEq/100 mL IVPB (Peripheral Line), 10 mEq, IntraVENous, PRN  dexamethasone (PF) (DECADRON) injection 6 mg, 6 mg, IntraVENous, Q24H  influenza quadrivalent split vaccine (FLUZONE;FLUARIX;FLULAVAL;AFLURIA) injection 0.5 mL, 0.5 mL, IntraMUSCular, Prior to discharge  ipratropium-albuterol (DUONEB) nebulizer solution 1 ampule, 1 ampule, Inhalation, TID         Objective:  /75   Pulse 89   Temp 97.2 °F (36.2 °C) (Oral)   Resp 18   Ht 4' 11\" (1.499 m)   Wt 201 lb 4.8 oz (91.3 kg)   SpO2 95%   BMI 40.66 kg/m²      Patient Vitals for the past 24 hrs:   BP Temp Temp src Pulse Resp SpO2 Height Weight   02/02/22 0849 -- -- -- -- 18 95 % -- --   02/02/22 0734 -- -- -- 89 -- -- -- --   02/02/22 0715 126/75 97.2 °F (36.2 °C) Oral 77 18 96 % -- --   02/02/22 0519 139/72 97.7 °F (36.5 °C) Oral 81 16 96 % -- 201 lb 4.8 oz (91.3 kg)   02/02/22 0019 109/63 97.7 °F (36.5 °C) Oral 80 16 93 % -- --   02/01/22 2115 126/68 98.2 °F (36.8 °C) Oral 85 16 95 % -- --   02/01/22 2039 -- -- -- -- 16 93 % -- --   02/01/22 1724 -- -- -- -- -- 94 % -- --   02/01/22 1718 133/68 97.6 °F (36.4 °C) Oral 77 20 95 % -- --   02/01/22 1230 -- -- -- -- -- -- 4' 11\" (1.499 m) --     Patient Vitals for the past 96 hrs (Last 3 readings):   Weight   02/02/22 0519 201 lb 4.8 oz (91.3 kg)   02/01/22 0441 195 lb 6.4 oz (88.6 kg)   01/31/22 0557 192 lb 14.4 oz (87.5 kg)           Intake/Output Summary (Last 24 hours) at 2/2/2022 6939  Last data filed at 2/1/2022 1751  Gross per 24 hour   Intake --   Output 350 ml   Net -350 ml         Physical Exam:   Vitals as above  General appearance: alert, appears stated age and cooperative    Head: Normocephalic, without obvious abnormality, atraumatic    Lungs: crackles bilaterally    Heart: regular rate and rhythm, S1, S2 normal, no murmur    Abdomen: soft, non-tender; bowel sounds normal; no masses, no organomegaly    Extremities: extremities normal, atraumatic, no cyanosis, no edema      Labs:  Lab Results   Component Value Date    WBC 9.0 02/02/2022    HGB 12.2 02/02/2022    HCT 36.8 02/02/2022     02/02/2022    CHOL 203 (H) 08/23/2018    TRIG 144 08/23/2018    HDL 63 (H) 08/23/2018    ALT 6 (L) 01/31/2022    AST 12 (L) 01/31/2022     02/02/2022    K 4.6 02/02/2022     02/02/2022    CREATININE 1.1 02/02/2022    BUN 29 (H) 02/02/2022    CO2 26 02/02/2022    TSH 3.35 08/23/2018    INR 1.05 01/03/2021    LABMICR YES 01/31/2022     Lab Results   Component Value Date    CKTOTAL 97 01/03/2021    TROPONINI <0.01 01/30/2022       Recent Imaging Results are Reviewed:  XR CHEST PORTABLE    Result Date: 1/31/2022  EXAMINATION: ONE XRAY VIEW OF THE CHEST 1/31/2022 8:57 am COMPARISON: X-ray dated 01/30/2022 HISTORY: ORDERING SYSTEM PROVIDED HISTORY: pneumonia TECHNOLOGIST PROVIDED HISTORY: Reason for exam:->pneumonia Reason for Exam: pneumonia FINDINGS: Right basilar atelectasis. No focal lung consolidation. No pleural effusion or pneumothorax. Cardiac silhouette is unremarkable. Degenerative disease of the visualized osseous structures. Right basilar atelectasis. XR CHEST PORTABLE    Result Date: 1/30/2022  EXAMINATION: ONE XRAY VIEW OF THE CHEST 1/30/2022 4:46 pm COMPARISON: 01/04/2021. HISTORY: ORDERING SYSTEM PROVIDED HISTORY: SOB TECHNOLOGIST PROVIDED HISTORY: Reason for exam:->SOB Reason for Exam: Concern For COVID-19 (from home via [facility] EMS c/o flu-like symptoms x 2 weeks with increased fever and shortness of breath today.) FINDINGS: Minimal left basilar likely scarring versus atelectasis. No focal consolidations, pleural effusions or pulmonary edema. No pneumothoraces. Cardiac and mediastinal silhouettes are within normal limits. No acute bony abnormality. No acute abnormality. Lab Results   Component Value Date    GLUCOSE 135 02/02/2022     Lab Results   Component Value Date    POCGLU 126 09/26/2018     /75   Pulse 89   Temp 97.2 °F (36.2 °C) (Oral)   Resp 18   Ht 4' 11\" (1.499 m)   Wt 201 lb 4.8 oz (91.3 kg)   SpO2 95%   BMI 40.66 kg/m²     Assessment and Plan:  Principal Problem:    Acute respiratory failure due to COVID-19 [facility]) -Established problem. Stable. Still on o2  Plan:  Cont iv steroids, baricitnib. Active Problems:    HTN (hypertension) -Established problem. Stable. 126/75  Plan: cont home meds    COVID-19 - Established problem. Stable. Plan: as above    Depression -Established problem. Stable. Mood ok  Plan: Continue present orders/plan. Dementia ([facility] [address] -Established problem. Stable.     Plan: Continue present orders/plan. UTI (urinary tract infection) - Established problem. Stable. Plan: iv rocephin started, await cx.  Trend wbc    Pt/ot rec SNF when pt ready to go      (Please note that portions of this note were completed with a voice recognition program.  Efforts were made to edit the dictations but occasionally words are mis-transcribed.)        Batsheva Desouza MD  2/2/2022

## 2022-02-02 NOTE — CARE COORDINATION
JOSEPH spoke with patient's dtr, Kye Glover, and discussed PT/OT recommendations. Dtr stated that pt has 24 hour care from grandson and grandtr at home and would prefer pt discharge home. SW informed pt was walking with the walker only 6 - 8 feet 2 days ago with therapy. Dtr stated this is normal for her at home. Stated pt may also need assistance with transfers. Dtr stated grandson can assist with this. Dtr agreeable to [facility]. Has had [facility] in the past.  Rosetta Maguire w/[facility] who accepted the patient. Tegan Sarabia w/[facility] who stated pt is no longer active. Dtr reported they had a concentrator at home but pt would need a new evaluation and new set up with [facility] before discharge. Asked hospitalist to put in a home oxygen eval and [facility] orders too. [address] transport is picking up patient at 6:30pm tonight. Dtr aware and ready to receive patient this evening. Discharge Plan:  Home w/[facility] and home oxygen w/[facility]. Grandson and grandtr to provide 24 hour care. Transport via [address], [phone], at 6:30pm tonight.     Electronically signed by CHEY Loomis, LISETH on 2/2/2022 at 1:33 PM

## 2022-02-02 NOTE — PROGRESS NOTES
Shift assessment completed. Routine vitals stable. Scheduled medications given. Patient is awake, alert and oriented. Respirations are easy and unlabored at this time. No c/o pain noted at this time. Pt sitting up in chair at this time. Call light within reach.

## 2022-02-02 NOTE — PROGRESS NOTES
02/02/22 1612   Resting (Room Air)   SpO2 94      During Walk (Room Air)   SpO2 87      Rate of Dyspnea 1   Symptoms Shortness of breath   Comments exerted patient to the best of her ability   During Walk (On O2)   SpO2 2      O2 Device Nasal cannula   Need Additional O2 Flow Rate Rows No   Rate of Dyspnea 0   After Walk   SpO2 90      O2 Flow Rate (l/min) 1 l/min   Rate of Dyspnea 0   Does the Patient Qualify for Home O2 Yes   Liter Flow at Rest 0   Liter Flow on Exertion 2   Does the Patient Need Portable Oxygen Tanks Yes   during walk on O2 was 90%. ..on 2 liters of Oxygen

## 2022-02-02 NOTE — PROGRESS NOTES
Pt and pt dtr requested pt have flu vaccine before d/c. Flu vaccine given in left deltoid at this time. Pt tolerated well.

## 2022-02-02 NOTE — DISCHARGE SUMMARY
[facility] -- Physician Discharge Summary     Negar Wong  8/6/1931  MRN: [id number]    Admit Date: 1/30/2022  Discharge Date: No discharge date for patient encounter. Attending MD: Jeffrey Fitzgerald MD  Discharging MD: Jeffrey Fitzgerald MD  PCP: Oliver Gordillo [address] [facility], [address] [place] / [address] [phone]    Admission Diagnosis: Acute respiratory failure ([address] [J96.00]  Acute respiratory failure with hypoxia ([address] [J96.01]  Suspected COVID-19 virus infection [Z20.822]  DISCHARGE DIAGNOSIS: same    Full Hospital Problem List:  Active Hospital Problems    Diagnosis Date Noted    UTI (urinary tract infection) [N39.0] 02/01/2022    Depression [F32. A] 01/30/2022    Dementia ([address] [F03.90] 01/30/2022    Acute respiratory failure ([address] [J96.00] 01/30/2022    COVID-19 [U07.1] 01/05/2021    Acute respiratory failure due to COVID-19 ([address] [U07.1, J96.00] 01/04/2021    HTN (hypertension) [I10] 09/23/2018           Hospital Course:     80 y. o. female who presents for evaluation of \"flulike\"symptoms x2 weeks with cough body aches and headache.  Patient has history of dementia and is poor historian.  Information mainly obtained through family and EMS.  No known fevers or chills.  EMS reports mild hypoxia at 90% on room air with no prior oxygen requirement.  She was on 4 L upon arrival to the ED.  Patient does report some shortness of breath.  No chest pain.  No abdominal pain nausea vomiting or diarrhea.  No known sick contacts with similar symptoms.  She is not vaccinated against COVID-19.  She has no other complaints or concerns at this time     There is concern for covid infection  She has new o2 requirement  To be admitted  She is stable on 2L o2  Due to impending weather event, family wishes to d/c home with home o2  As her o2 requirement has been stable, this is acceptable plann    Consults made during Hospitalization:  IP CONSULT TO PODIATRY  IP CONSULT TO HOME CARE NEEDS    Treatment team at time of Discharge: Treatment Team: Attending Provider: Braxton Duff MD; Consulting Physician: Braxton Duff MD; Utilization Reviewer: Tavares Wang RN; Utilization Reviewer: Solo Avilez RN; Consulting Physician: Digna Renteria DPM; Respiratory Therapist (Day): Hollie Whittington RCP; Registered Nurse: Victor Manuel Gonzalez RN; Physical Therapist Assistant: Carlos Huff PTA; Physical Therapist: Dave Sierra PT; Occupational Therapist: Robinson Mcclelland OT    Imaging Results:  XR CHEST PORTABLE    Result Date: 1/31/2022  EXAMINATION: ONE XRAY VIEW OF THE CHEST 1/31/2022 8:57 am COMPARISON: X-ray dated 01/30/2022 HISTORY: ORDERING SYSTEM PROVIDED HISTORY: pneumonia TECHNOLOGIST PROVIDED HISTORY: Reason for exam:->pneumonia Reason for Exam: pneumonia FINDINGS: Right basilar atelectasis. No focal lung consolidation. No pleural effusion or pneumothorax. Cardiac silhouette is unremarkable. Degenerative disease of the visualized osseous structures. Right basilar atelectasis. XR CHEST PORTABLE    Result Date: 1/30/2022  EXAMINATION: ONE XRAY VIEW OF THE CHEST 1/30/2022 4:46 pm COMPARISON: 01/04/2021. HISTORY: ORDERING SYSTEM PROVIDED HISTORY: SOB TECHNOLOGIST PROVIDED HISTORY: Reason for exam:->SOB Reason for Exam: Concern For COVID-19 (from home via [facility] EMS c/o flu-like symptoms x 2 weeks with increased fever and shortness of breath today.) FINDINGS: Minimal left basilar likely scarring versus atelectasis. No focal consolidations, pleural effusions or pulmonary edema. No pneumothoraces. Cardiac and mediastinal silhouettes are within normal limits. No acute bony abnormality. No acute abnormality.          Discharge Exam:  See progress note from day of d/c    Disposition: home    Condition: stable    Discharge Medications:     Medication List      START taking these medications    cephALEXin 500 MG capsule  Commonly known as: KEFLEX  Take 1 capsule by mouth 3 times daily for 10 days     dexamethasone 2 MG tablet  Commonly known as: DECADRON  Take 1 tablet by mouth 2 times daily (with meals) for 7 days        CONTINUE taking these medications    albuterol sulfate  (90 Base) MCG/ACT inhaler  Commonly known as: Proventil HFA  Inhale 2 puffs into the lungs every 6 hours as needed for Wheezing     budesonide-formoterol 160-4.5 MCG/ACT Aero  Commonly known as: Symbicort  Inhale 2 puffs into the lungs 2 times daily     cetirizine 10 MG tablet  Commonly known as: ZYRTEC     digoxin 125 MCG tablet  Commonly known as: LANOXIN     donepezil 5 MG tablet  Commonly known as: ARICEPT     furosemide 40 MG tablet  Commonly known as: LASIX     gabapentin 100 MG capsule  Commonly known as: NEURONTIN     loratadine 10 MG tablet  Commonly known as: CLARITIN     potassium chloride 20 MEQ packet  Commonly known as: KLOR-CON     spironolactone 25 MG tablet  Commonly known as: ALDACTONE  Take 0.5 tablets by mouth daily     traZODone 50 MG tablet  Commonly known as: DESYREL     venlafaxine 75 MG extended release capsule  Commonly known as: EFFEXOR XR           Where to Get Your Medications      You can get these medications from any pharmacy    Bring a paper prescription for each of these medications  · cephALEXin 500 MG capsule  · dexamethasone 2 MG tablet       . Allergies: Allergies   Allergen Reactions    Pcn [Penicillins]        Follow up Instructions: Follow-up with PCP: Julio Pan DO in 2 wk .       Total time spent on day of discharge including face-to-face visit, examination, documentation, counseling, preparation of discharge plans and followup, and discharge medicine reconciliation and presciptions is 33 minutes    Signed:  Leidy Burroughs MD  2/2/2022

## 2022-02-02 NOTE — PROGRESS NOTES
Occupational Therapy  Facility/Department: [address] ONCOLOGY  Daily Treatment Note  NAME: Ramya Grey  : 1931  MRN: [id number]    Date of Service: 2022    Discharge Recommendations: Ramya Grey scored a 15/24 on the AM-PAC ADL Inpatient form. Current research shows that an AM-PAC score of 17 or less is typically not associated with a discharge to the patient's home setting. Based on the patient's AM-PAC score and their current ADL deficits, it is recommended that the patient have 3-5 sessions per week of Occupational Therapy at d/c to increase the patient's independence. Please see assessment section for further patient specific details. If patient discharges prior to next session this note will serve as a discharge summary. Please see below for the latest assessment towards goals. OT Equipment Recommendations  Equipment Needed: No  Other: defer to next level of care    Assessment   Performance deficits / Impairments: Decreased functional mobility ; Decreased ADL status; Decreased strength;Decreased safe awareness;Decreased endurance;Decreased balance  Assessment: Pt presents with the above deficits placing her below her baseline level of function. Pt is limited by fatigue and SOB, requiring frequent rest breaks throughout activity. extensive assist to achieve stance this date with low standing activity tolerance.  Recommended skilled OT services to maximize safety and independence with all occupations as well as reduce caregiver burden  Treatment Diagnosis: multiple deficits secondary to mild hypoxia, cough and other \"flu-like\" symptoms  Prognosis: Good  OT Education: Plan of Care;OT Role;Transfer Training (PLB)  Patient Education: Pt is not an independent learner, continue to reinfroce  Barriers to Learning: cognition/memory  REQUIRES OT FOLLOW UP: Yes  Activity Tolerance  Activity Tolerance: Patient Tolerated treatment well;Patient limited by fatigue  Activity Tolerance: frequest rest breaks required throughout session- pt on 3L O2, destating to low 80s with activity, increasing to 90%+ with rest and cues for effective breathing  Safety Devices  Safety Devices in place: Yes  Type of devices: Left in chair; All fall risk precautions in place;Call light within reach; Chair alarm in place;Nurse notified         Patient Diagnosis(es): The primary encounter diagnosis was Suspected COVID-19 virus infection. A diagnosis of Acute respiratory failure with hypoxia (HCC) was also pertinent to this visit. has a past medical history of Arthritis, CHF (congestive heart failure) ([facility] COPD (chronic obstructive pulmonary disease) ([facility] [facility] Dementia ([facility] [facility] Depression, and Hypertension. has a past surgical history that includes Hysterectomy. Restrictions  Restrictions/Precautions  Restrictions/Precautions: Fall Risk,Isolation (high fall risk, covid+)  Required Braces or Orthoses?: No  Position Activity Restriction  Other position/activity restrictions: Khadra Razo is a 80 y.o. female who presents for evaluation of \"flulike\"symptoms x2 weeks with cough body aches and headache. Patient has history of dementia and is poor historian. Information mainly obtained through family and EMS. No known fevers or chills. EMS reports mild hypoxia at 90% on room air with no prior oxygen requirement. She was on 4 L upon arrival to the ED. Patient does report some shortness of breath. No chest pain. No abdominal pain nausea vomiting or diarrhea. No known sick contacts with similar symptoms. She is not vaccinated against COVID-19.   She has no other complaints or concerns at this time    Subjective   General  Chart Reviewed: Yes  Patient assessed for rehabilitation services?: Yes  Additional Pertinent Hx: Arthritis, CHF, COPD, HTN, demenita  Family / Caregiver Present: No  Diagnosis: Acute respiratory failure  Subjective  Subjective: Pt in recliner at entry, agreeable to OT session, denied pain  Vital Signs  Patient Currently in Pain: Denies          Objective    ADL  Additional Comments: Pt declined all ADL needs           Balance  Sitting Balance: Supervision  Standing Balance: Minimal assistance (Min to CGA)  Standing Balance  Time: ~2mins  Activity: stance to RW from recliner- scapular retraction in stance x5  Comment: flexed forward, heavy reliance on UEs, TCs/VCs for posture        Transfers  Sit to stand: Moderate assistance  Stand to sit: Minimal assistance  Transfer Comments: 2 attempts to achieve stance, cues for hand placement                          Cognition  Overall Cognitive Status: Exceptions  Arousal/Alertness: Appropriate responses to stimuli  Following Commands: Follows one step commands with increased time; Follows one step commands with repetition  Attention Span: Attends with cues to redirect  Memory: Decreased recall of biographical Information;Decreased recall of recent events;Decreased short term memory  Safety Judgement: Decreased awareness of need for assistance;Decreased awareness of need for safety  Problem Solving: Assistance required to generate solutions;Assistance required to correct errors made  Insights: Decreased awareness of deficits  Initiation: Requires cues for some  Sequencing: Requires cues for some  Cognition Comment: pt Hopi                       Type of ROM/Therapeutic Exercise  Type of ROM/Therapeutic Exercise: AROM  Comment: Multiple UB/LB TE in recliner for strength/endurance and emphasis on effective breathing patterns: 8 reps x1 of (B) shoulder flexion and horizontal abduction, 10 reps x1 of forward punches/target taps, 10 reps x1 of alternating seated marches, and 6 reps x1 of modified sit-ups in recliner- rest breaks taken as needed throughout                    Plan   Plan  Times per week: 3-5x  Times per day: Daily  Current Treatment Recommendations: Strengthening,Balance Training,Functional Mobility Training,Endurance Training,Safety Education & Training,Self-Care / ADL,Patient/Caregiver Education & Training           AM-PAC Score        AM-PAC Inpatient Daily Activity Raw Score: 15 (02/02/22 1517)  AM-PAC Inpatient ADL T-Scale Score : 34.69 (02/02/22 1517)  ADL Inpatient CMS 0-100% Score: 56.46 (02/02/22 1517)  ADL Inpatient CMS G-Code Modifier : CK (02/02/22 1517)    Goals  Short term goals  Time Frame for Short term goals: discharge  Short term goal 1: Supervision for sit-to-stand and stand-to-sit transfers- ModA 2/2  Short term goal 2: SBA for toileting- not addressed 2/2  Short term goal 3: SBA with RW for functional mobility with ADL activities- not addressed 2/2  Short term goal 4: Supervision for seated ADL activities- ongoing 2/2  Short term goal 5: SBA while standing to RW for ADL activities for ~5 minutes, ongoing 2/2  Patient Goals   Patient goals : return home       Therapy Time   Individual Concurrent Group Co-treatment   Time In 1900         Time Out 1404         Minutes 43         Timed Code Treatment Minutes: 43 Minutes       Total Treatment Minutes:  [address] [facility] [address] [place] [id number]

## 2022-02-02 NOTE — PROGRESS NOTES
02/02/22 1551   Resting (Room Air)   SpO2 93      During Walk (Room Air)   SpO2 92      Comments patient exerted to the best of her ablilty without walking   During Walk (On O2)   SpO2 88   O2 Flow Rate (l/min) 2 l/min   After Walk   SpO2 93      O2 Flow Rate (l/min) 2 l/min   Does the Patient Qualify for Home O2 Yes   Liter Flow at Rest 0   Liter Flow on Exertion 2      02/02/22 1551   Resting (Room Air)   SpO2 93      During Walk (Room Air)   SpO2 92      Comments patient exerted to the best of her ablilty without walking   During Walk (On O2)   SpO2 88   O2 Flow Rate (l/min) 2 l/min   After Walk   SpO2 93      O2 Flow Rate (l/min) 2 l/min   Does the Patient Qualify for Home O2 Yes   Liter Flow at Rest 0   Liter Flow on Exertion 2      02/02/22 1551   Resting (Room Air)   SpO2 93      During Walk (Room Air)   SpO2 92      Comments patient exerted to the best of her ablilty without walking   During Walk (On O2)   SpO2 88   O2 Flow Rate (l/min) 2 l/min   After Walk   SpO2 93      O2 Flow Rate (l/min) 2 l/min   Does the Patient Qualify for Home O2 Yes   Liter Flow at Rest 0   Liter Flow on Exertion 2

## 2022-02-02 NOTE — DISCHARGE INSTR - DIET

## 2022-02-02 NOTE — CARE COORDINATION
Becky received a referral to this patient for home 02 @ 2 lpm cont via nc. Home 02 has been arranged for delivery. Pt's family aware to call Becky [phone] to initiate setup. Portable tank has been delivered to the hospital floor for discharge. Thank you for the referral.  Electronically signed by Little Aviles on 2/2/2022 at 4:59 PM  Cell ph# [phone]    NOTE: After 5:00 pm, Weekends, Holidays: Call Afsane[facility]/Becky On-Call at [phone] to coordinate delivery of home medical equipment.

## 2022-02-03 NOTE — CARE COORDINATION
\"Pamela\" with [facility] called and left  for CTN confirming that they have received orders for [address]. CTN will remain available.

## 2022-02-03 NOTE — CARE COORDINATION
Transitions of Care Call  Call within 2 business days of discharge: Yes     Daughter reports that patient is doing \"very well\" according to grandson that lives there with her. She is not having any trouble breathing and is using O2 PRN. Discussed discharge instructions and reviewed medications. Daughter was not aware that patient was discharged with any new medications. CTN explained that she was prescribed Keflex and Decadron, she needs to start these ASAP. Due to weather, the pharmacy is closed today, it is located within [facility] office. She will get the medication for the patient as soon as possible. CTN will continue with outreach follow up calls. CTN reached out to [facility] and left a VM requesting a call back to confirm that they have received home care orders. Patient: Shruthi Sickle Patient : 1931   MRN: [id number]  Reason for Admission: UTI, resp failure, depression, dementia, COVID+  Discharge Date: 22 RARS: Readmission Risk Score: 14 ( )      Last Discharge [facility]       Complaint Diagnosis Description Type Department Provider    22 Concern For COVID-19 Suspected COVID-19 virus infection . .. ED to Hosp-Admission (Discharged) (ADMITTED) [facility] Batsheva Desouza MD          Was this an external facility discharge? No Discharge Facility:     Challenges to be reviewed by the provider   Additional needs identified to be addressed with provider: No  none                 Encounter was not routed to provider for escalation. Method of communication with provider: none. Discussed COVID-19 related testing which was: available at this time. Test results were: positive. Patient informed of results, if available? Yes. Current Symptoms: no new symptoms and no worsening symptoms    Reviewed New or Changed Meds: yes    Do you have what you need at home?    Durable Medical Equipment ordered at discharge: [address] [facility]/Outpatient orders at discharge: home

## 2022-02-03 NOTE — CONSULTS
Podiatric Surgery Consult note  Charlie Cano  Subjective :   Patient seen and examined. Patient has chief complaint of long painful nails that are hard to cut. The patient reports that they are unable to be trimmed by them or their family members. No other complaints at this time.       Past Medical History:    Past Medical History:   Diagnosis Date    Arthritis     [facility] failure) ([facility] [address]     COPD (chronic obstructive pulmonary disease) (HCC)     Dementia (HCC)     Depression     Hypertension      Past Surgical History:    Past Surgical History:   Procedure Laterality Date    HYSTERECTOMY         Medications: Scheduled Meds:   cefTRIAXone (ROCEPHIN) IV  1,000 mg IntraVENous Q24H    enoxaparin  40 mg SubCUTAneous BID    baricitinib  2 mg Oral Daily    venlafaxine  150 mg Oral Daily    budesonide-formoterol  2 puff Inhalation BID    gabapentin  100 mg Oral TID    traZODone  50 mg Oral Nightly    cetirizine  5 mg Oral Daily    sodium chloride flush  5-40 mL IntraVENous 2 times per day    dexamethasone  6 mg IntraVENous Q24H    ipratropium-albuterol  1 ampule Inhalation TID     Continuous Infusions:   sodium chloride 75 mL/hr at 02/02/22 0022    sodium chloride       PRN Meds:.mineral oil-hydrophilic petrolatum, albuterol sulfate HFA, sodium chloride flush, sodium chloride, ondansetron **OR** ondansetron, magnesium hydroxide, acetaminophen **OR** acetaminophen, hydrALAZINE, sodium chloride, potassium chloride **OR** potassium alternative oral replacement **OR** potassium chloride    Allergies:  Pcn [penicillins]    Social History:   Social History     Socioeconomic History    Marital status:      Spouse name: None    Number of children: None    Years of education: None    Highest education level: None   Occupational History    None   Tobacco Use    Smoking status: Former Smoker    Smokeless tobacco: Never Used    Tobacco comment: 7 years   Vaping Use    Vaping Use: Never used   Substance and Sexual Activity    Alcohol use: No    Drug use: None    Sexual activity: None   Other Topics Concern    None   Social History Narrative    None     Social Determinants of Health     Financial Resource Strain:     Difficulty of Paying Living Expenses: Not on file   Food Insecurity:     Worried About Running Out of Food in the Last Year: Not on file    Mamie of Food in the Last Year: Not on file   Transportation Needs:     Lack of Transportation (Medical): Not on file    Lack of Transportation (Non-Medical): Not on file   Physical Activity:     Days of Exercise per Week: Not on file    Minutes of Exercise per Session: Not on file   Stress:     Feeling of Stress : Not on file   Social Connections:     Frequency of Communication with Friends and Family: Not on file    Frequency of Social Gatherings with Friends and Family: Not on file    Attends Caodaism Services: Not on file    Active Member of [address] [place] [address] [employer] or Organizations: Not on file    Attends Club or Organization Meetings: Not on file    Marital Status: Not on file   Intimate Partner Violence:     Fear of Current or Ex-Partner: Not on file    Emotionally Abused: Not on file    Physically Abused: Not on file    Sexually Abused: Not on file   Housing Stability:     Unable to Pay for Housing in the Last Year: Not on file    Number of [place] in the Last Year: Not on file    Unstable Housing in the Last Year: Not on file     Family History:   History reviewed. No pertinent family history.   REVIEW OF SYSTEMS:  Negative other than mentioned in the HPI           Objective     /64   Pulse 102   Temp 98.1 °F (36.7 °C) (Oral)   Resp 18   Ht 4' 11\" (1.499 m)   Wt 201 lb 4.8 oz (91.3 kg)   SpO2 93%   BMI 40.66 kg/m²      I/O:    Intake/Output Summary (Last 24 hours) at 2/2/2022 1952  Last data filed at 2/2/2022 1816  Gross per 24 hour   Intake --   Output 725 ml   Net -725 ml              Wt Readings from Last 3 Encounters:   02/02/22 201 lb 4.8 oz (91.3 kg)   01/08/21 224 lb 11.2 oz (101.9 kg)   04/21/19 250 lb (113.4 kg)       LABS:    Recent Labs     02/01/22  0301 02/02/22  0326   WBC 7.7 9.0   HGB 12.6 12.2   HCT 38.7 36.8    280        Recent Labs     02/02/22  0326      K 4.6      CO2 26   BUN 29*   CREATININE 1.1        Recent Labs     01/31/22  0313   PROT 5.7*      No results for input(s): CKTOTAL, CKMB, CKMBINDEX, TROPONINI in the last 72 hours. Exam:     DERMATOLOGIC:   Nails 1-5 bilaterally are thickened with dystrophic changes. Positive subungual debris  Positive onycholysis  Negative erythema  Positive incurvation  Negative open lesions    VASCULAR:  1+ edema  positive findings: dorsalis pedis 1+/1+, posterior tibial 1+/1+     NEUROLOGIC:  joint position sense, proprioception or temperature intact    ORTHOPEDIC:  2nd toe, 3rd toe, 4th toe, 5th toe, b/l HT deformity noted. ASSESSMENT/PLAN:   Pt. is a 80 y.o. female with elongated painful hard to cut toenails in the setting of peripheral vascular disease    - All nails debrided without incident.  - Debris cleaned from between toes. Recommend that the patient follow up outpatient further podiatric care to prevent or care for the many podiatric problems that can arise from PVD and Her multiple medical problems. Thank you for this consult. This requires no further inpatient care and can be followed outpatient. The patient is safe to d/c from our standpoint.     CHRISTINA Omalley [facility] 2/2/2022 7:52 PM

## 2022-02-08 NOTE — CARE COORDINATION
[facility] Transitions Follow Up Call    2022    Patient: Aminata Cr  Patient : 1931   MRN: [id number]  Reason for Admission: [address]  Discharge Date: 22 RARS: Readmission Risk Score: 14 ( )    Attempted to reach pt for follow up call. Left message requesting call back. Care Transitions Subsequent and Final Call    Subsequent and Final Calls  Care Transitions Interventions  Other Interventions: Follow Up  No future appointments.     Star Delgado

## 2022-02-09 NOTE — CARE COORDINATION
[facility] Transitions Follow Up Call    2022    Patient: Shakira Anderson  Patient : 1931   MRN: <[id number]>  Reason for Admission: UTI, resp failure, depression, dementia, COVID+  Discharge Date: 22 RARS: Readmission Risk Score: 14 ( )       Spoke with: HIPPA compliant message left for f/u transition call. Provided this RN contact information and acting CTNs contact information asking for return call. Will reach out at another time. Follow Up  No future appointments.     Darlin Briscoe RN

## 2022-02-15 NOTE — ED NOTES
Pt in bed resting, breathing easy, non-labored, no distress noted. Pt on bedside cardiac monitor, alarms audible, VSS. Received on 2L o2 via NC, tolerating well from previous shift. Pt voices no current complaints or needs, family at bedside. Per daughter, Pt has hx of dementia, is hard of hearing. Pt noted to have dry, red rash all over body. Negative for CP, SOB, resp distress.       Dede Harris RN  02/14/22 2124       Dede Harris RN  02/14/22 2125

## 2022-02-15 NOTE — ED PROVIDER NOTES
[facility] Emergency Department      Pt Name: Shakira Anderson  MRN: [id number]  [place] 8/6/1931  Date of evaluation: 2/14/2022  Provider: La Wright MD  CHIEF COMPLAINT  Chief Complaint   Patient presents with    Rash     sent from home. family states redness to body legs and arms started today. poor historian     HPI  Shakira Anderson is a 80 y.o. female who presents because of illness. Daughter says she was admitted for a Covid infection within the past 2 weeks and was discharged with a steroid and an antibiotic for UTI, keflex. The patient has had a slight rash since she was discharged. Over the past couple days, the rash has gotten worse. She has noticed it is a lot worse on her legs and her torso. The patient lives with her grandson who is her caregiver. Her daughter went to check on her today and called EMS to bring her here. The patient admits to some shortness of breath. She denies any chest pain. She is a poor historian and hard of hearing. REVIEW OF SYSTEMS:  See HPI for further details. Remainder of review of systems limited by patient ability to provide history. Nursing notes reviewed. PAST MEDICAL HISTORY  Past Medical History:   Diagnosis Date    Arthritis     [facility]     COPD (chronic obstructive pulmonary disease) [facility]     Dementia ([facility]     Depression     Hypertension      SURGICAL HISTORY  Past Surgical History:   Procedure Laterality Date    HYSTERECTOMY       MEDICATIONS:  No current facility-administered medications on file prior to encounter.      Current Outpatient Medications on File Prior to Encounter   Medication Sig Dispense Refill    cetirizine (ZYRTEC) 10 MG tablet Take 10 mg by mouth daily      venlafaxine (EFFEXOR XR) 75 MG extended release capsule Take 150 mg by mouth daily 75mg x 2      donepezil (ARICEPT) 5 MG tablet Take 5 mg by mouth nightly      loratadine (CLARITIN) 10 MG tablet Take 10 mg by mouth daily      gabapentin (NEURONTIN) 100 MG capsule Take 100 mg by mouth 3 times daily.  traZODone (DESYREL) 50 MG tablet Take 50 mg by mouth nightly      potassium chloride (KLOR-CON) 20 MEQ packet Take 20 mEq by mouth daily      spironolactone (ALDACTONE) 25 MG tablet Take 0.5 tablets by mouth daily 30 tablet 3    albuterol sulfate HFA (PROVENTIL HFA) 108 (90 Base) MCG/ACT inhaler Inhale 2 puffs into the lungs every 6 hours as needed for Wheezing 1 Inhaler 3    budesonide-formoterol (SYMBICORT) 160-4.5 MCG/ACT AERO Inhale 2 puffs into the lungs 2 times daily 1 Inhaler 3    furosemide (LASIX) 40 MG tablet Take 40 mg by mouth as needed       digoxin (LANOXIN) 125 MCG tablet Take 125 mcg by mouth daily       ALLERGIES  Pcn [penicillins]  FAMILY HISTORY  No family history on file. SOCIAL HISTORY:  Social History     Tobacco Use    Smoking status: Former Smoker    Smokeless tobacco: Never Used    Tobacco comment: 7 years   Vaping Use    Vaping Use: Never used   Substance Use Topics    Alcohol use: No    Drug use: Not on file     IMMUNIZATIONS:  Noncontributory    PHYSICAL EXAM  VITAL SIGNS:  Blood pressure 110/62, pulse 125, temperature 98.5 °F (36.9 °C), resp. rate 24. Constitutional:  80 y.o. female alert, cooperative  HENT:  Atraumatic, mucous membranes dry, no lesions seen  Eyes:   Conjunctiva clear, no icterus  Neck:  Supple, no JVD, no signs of injury  Cardiovascular:  Regular, no rubs  Thorax & Lungs:  No accessory muscle usage, clear  Abdomen:  Soft, non distended, bowel sounds present, NT  Back:  No deformity  Genitalia:  Deferred  Rectal:  Deferred  Extremities:  No cyanosis, no edema  Skin:  Warm, dry  Neurologic:  Alert, no slurred speech, no focal deficits noted, [place] with poor short term memory  Psychiatric:  Affect appropriate                    DIAGNOSTIC RESULTS:  Labs resulted at the time of this note reviewed.   Labs Reviewed   CBC WITH AUTO DIFFERENTIAL - Abnormal; Notable for the following components:       Result Value    WBC 20.3 (*)     Neutrophils Absolute 17.9 (*)     All other components within normal limits    Narrative:     Performed at:                  [facility] [address], Formerly named [facility] [place]   Phone [phone]   COMPREHENSIVE METABOLIC PANEL W/ REFLEX TO MG FOR LOW K - Abnormal; Notable for the following components:    Sodium 132 (*)     Chloride 92 (*)     Glucose 121 (*)     BUN 26 (*)     CREATININE 1.4 (*)     GFR Non- 35 (*)     GFR  43 (*)     Albumin 2.5 (*)     Albumin/Globulin Ratio 0.6 (*)     Total Bilirubin 1.7 (*)     Alkaline Phosphatase 267 (*)     All other components within normal limits    Narrative:     Performed at:                  [facility] [address]  [place], Formerly named [facility] [place]   Phone [phone]   LACTIC ACID, PLASMA - Abnormal; Notable for the following components:    Lactic Acid 5.3 (*)     All other components within normal limits    Narrative:     Lico Alonzoin  [facility] tel. [phone],                  Chemistry results called to and read back by BILLIE Tomas, 02/14/2022                  19:14, by YENIFER PICHARDO| [facility]                  Performed at:                  [facility] [address]  [place], Formerly named [facility] [place]   Phone [phone]   BLOOD GAS, VENOUS - Abnormal; Notable for the following components:    pH, Fede 7.290 (*)     pCO2, Fede 57.2 (*)     pO2, Fede 45.6 (*)     Carboxyhemoglobin 3.1 (*)     All other components within normal limits    Narrative:     Performed at:                  [facility] [address] [facility], Formerly named [facility] [place]   Phone [phone]   URINE RT REFLEX TO CULTURE - Abnormal; Notable for the following components:    Glucose, Ur 100 (*)     Bilirubin Urine MODERATE (*)     Ketones, Urine TRACE (*) Protein, UA 30 (*)     Urobilinogen, Urine >=8.0 (*)     Leukocyte Esterase, Urine TRACE (*)     All other components within normal limits    Narrative:     Performed at:                  [facility] [address], [facility], [address] [place] [address] [place]   Phone [phone]   BRAIN NATRIURETIC PEPTIDE - Abnormal; Notable for the following components:    Pro- (*)     All other components within normal limits    Narrative:     Performed at:                  [facility] [address], [facility], [address] [place] [address]   Phone [phone]   PROTIME-INR - Abnormal; Notable for the following components:    Protime 13.3 (*)     INR 1.17 (*)     All other components within normal limits    Narrative:     Performed at:                  [facility] [address], [facility], [facility] [place]   Phone [phone]   DIGOXIN LEVEL - Abnormal; Notable for the following components:    Digoxin Lvl <0.3 (*)     All other components within normal limits    Narrative:     Performed at:                  [facility] [address], [facility], [facility] [place]   Phone [phone]   SEDIMENTATION RATE - Abnormal; Notable for the following components:    Sed Rate 67 (*)     All other components within normal limits    Narrative:     Performed at:                  [facility] [address], [facility], [facility] [place]   Phone [phone]   C-REACTIVE PROTEIN - Abnormal; Notable for the following components:    .9 (*)     All other components within normal limits    Narrative:     Performed at:                  [facility] [address], [facility], [facility] [place]   Phone [phone]   PROCALCITONIN - Abnormal; Notable for the following components:    Procalcitonin 0.58 (*)     All other components within normal limits    Narrative:     Performed at:                  [facility]                  [address] [facility],  [place], [address]   Phone [phone] - Abnormal; Notable for the following components:    Hyaline Casts, UA 10 (*)     WBC, UA 31 (*)     RBC, UA 11 (*)     All other components within normal limits    Narrative:     Performed at:                  [facility]                  [address] [facility],  [place], [address]   Phone [phone]   CULTURE, BLOOD 1   CULTURE, BLOOD 2   CULTURE, URINE   TROPONIN    Narrative:     Performed at:                  [facility]                  [address] [facility],  [place], [address]   Phone [phone]   CK    Narrative:     Performed at:                  [facility]                  [address] [facility],  [place], [address]   Phone [phone]   LACTIC ACID, PLASMA     EKG:  Read by me in the absence of a cardiologist shows: Sinus tachycardia, rate 125, QRS duration normal, axis XX 5 degrees, low voltages, nonspecific ST-T wave abnormality, faster heart rate when compared to prior study from 30 January 2022    RADIOLOGY:    Plain x-rays were viewed by me:   XR CHEST PORTABLE   Final Result   No airspace disease by radiograph.            ED COURSE:    Medications administered:  Medications   vancomycin (VANCOCIN) 1,250 mg in dextrose 5 % 250 mL IVPB (0 mg IntraVENous Stopped 2/14/22 2258)   lactated ringers infusion ( IntraVENous Stopped 2/14/22 2100)   clindamycin (CLEOCIN) 600 mg in dextrose 5 % 50 mL IVPB (0 mg IntraVENous Stopped 2/14/22 2113)   lactated ringers infusion 1,000 mL (0 mLs IntraVENous Stopped 2/14/22 2209)     SEP-1 CORE MEASURE DATA  Classification: severe sepsis  Amount of fluids ordered: at least 30mL/kg based on ideal body weight due to obesity defined as BMI >30 Time at which sepsis was identified: 2300  Broad-spectrum antibiotics chosen:Vanc, clindamycin  based on sepsis order-set for a suspected source of: Skin and Soft Tissue  Repeat lactate level: improving  On reassessment after treatment:  Updated vital signs: /62   Pulse 99   Temp 98.5 °F (36.9 °C)   Resp 13   SpO2 97%   Cardiopulmonary examination: regular, normal wob, Capillary refill: brisk, Peripheral pulses: fair Skin examination: warm     PROCEDURES:  None    CRITICAL CARE:  Total critical care time of 31 minutes (excludes any time for procedures). This includes but not limited to vital sign monitoring, medication, clinical response to medications, interpretation of diagnostics, review of nursing notes, pertinent record review, discussions about patient condition, consultation time, documentation time. Critical care due to the patient's sepsis. CONSULTATIONS:  Hospitalist Dr Jazmin Vergara declined admission due to no dermatology support in this hospital, Dr Evelyn Comer: Khadra Razo is a 80 y.o. female who presented because of rash. She has findings consistent with sepsis. Source seems to be skin. We reached out to  since it is the closer facility with Dermatology coverage but they were not able to accept the patient. I spoke with Dr Amy Butler at [facility] and she has accepted. Pt has received IVF and abx and was relatively stable during time of evaluation in the ED thus far. FINAL IMPRESSION:    1. Septicemia ([address]    2. Rash        (Please note that I used voice recognition software to generate this note.   Occasionally words are mistranscribed despite my efforts to edit errors.)        Marlen Heredia MD  02/15/22 6509

## 2022-02-15 NOTE — ED NOTES
Care transferred to EMS crew for transfer to [facility]. All paperwork and imaging given to EMS. Care transferred without incident, belongings sent with EMS staff.       Cassie Wolf RN  02/15/22 Romy Nevarez RN  02/15/22 1728

## 2022-02-15 NOTE — ED NOTES
Report to Lima Chery RN at patient bedside. Family at bedside.   Monitor on on patient remains in [facility], [address] [place] [address]  02/14/22 1922

## 2022-02-15 NOTE — ED NOTES
Pt's daughter Lora Fritz, [phone]  Requests update for plan of care when available.       Gwendolyn Quarles RN  02/14/22 4510

## 2022-02-15 NOTE — ED NOTES
Report called to BILLIE Mccoy at [facility]. Pending transport, ETA 0200.  Pt's daughter updated     Gabi Lewis RN  02/15/22 [id number] Jr Miranda RN  02/15/22 4764

## 2022-03-03 PROBLEM — N39.0 UTI (URINARY TRACT INFECTION): Status: RESOLVED | Noted: 2022-01-01 | Resolved: 2022-01-01

## 2022-07-08 PROBLEM — A41.9 SEPSIS SECONDARY TO UTI (HCC): Status: ACTIVE | Noted: 2022-01-01

## 2022-07-08 PROBLEM — N39.0 SEPSIS SECONDARY TO UTI (HCC): Status: ACTIVE | Noted: 2022-01-01

## 2022-07-08 NOTE — ED PROVIDER NOTES
[facility] Emergency Department    Victorino Kaplan MD, am the primary clinician of record. CHIEF COMPLAINT  Chief Complaint   Patient presents with    Altered Mental Status     increased confusion; alert to person only; fom home; hx UTI; FAST=2; on home oxygen 2lpm; [id number]      HISTORY OF PRESENT ILLNESS  Jessica Zhu is a 80 y.o. female  who presents to the ED complaining of altered mental status compared to baseline with lethargy. The patient's history is supplemented by the patient's only child, her daughter, at the bedside who is her power of . She has a history of Alzheimer's dementia. Although there has been no formal CODE STATUS I did discuss CODE STATUS with the daughter who at this time would not likely want the patient intubated and no ACLS/CPR should the patient have cardiac arrest but was agreeable to central lines and aggressive medical management otherwise. The patient arrives hypotensive, confused and disoriented, apparently more than baseline according to the daughter. She has a history of UTIs and this is reminiscent of previous UTIs. The patient has severe allergies to penicillins and cephalosporins. Patient does have a history of COPD hypertension and CHF as well. The patient denies any pain anywhere. I am unaware of any recent falls or fevers. The patient has had no chest pain or shortness of breath. She has not been coughing according to the daughter. No other complaints, modifying factors or associated symptoms. I have reviewed the following from the nursing documentation. Past Medical History:   Diagnosis Date    Arthritis     CHF [facility]congestive heart failure) ([facility] [address]     COPD (chronic obstructive pulmonary disease) (HCC)     Dementia (HCC)     Depression     Hypertension      Past Surgical History:   Procedure Laterality Date    HYSTERECTOMY       No family history on file.   Social History     Socioeconomic History    Marital infusion  30 mL/kg IntraVENous Once Jeromy Baez MD        vancomycin [facility]) 1,500 mg in dextrose 5 % 250 mL IVPB  15 mg/kg IntraVENous Once Jeromy Baez MD        meropenem [facility]) 1,000 mg in sodium chloride 0.9 % 100 mL IVPB (mini-bag)  1,000 mg IntraVENous Once Jeromy Baez MD        norepinephrine (LEVOPHED) 16 mg in sodium chloride 0.9 % 250 mL infusion  1-100 mcg/min IntraVENous Continuous Jeromy Baez MD         Current Outpatient Medications   Medication Sig Dispense Refill    cetirizine (ZYRTEC) 10 MG tablet Take 10 mg by mouth daily      venlafaxine (EFFEXOR XR) 75 MG extended release capsule Take 150 mg by mouth daily 75mg x 2      donepezil (ARICEPT) 5 MG tablet Take 5 mg by mouth nightly      loratadine (CLARITIN) 10 MG tablet Take 10 mg by mouth daily      gabapentin (NEURONTIN) 100 MG capsule Take 100 mg by mouth 3 times daily.  traZODone (DESYREL) 50 MG tablet Take 50 mg by mouth nightly      potassium chloride (KLOR-CON) 20 MEQ packet Take 20 mEq by mouth daily      spironolactone (ALDACTONE) 25 MG tablet Take 0.5 tablets by mouth daily 30 tablet 3    albuterol sulfate HFA (PROVENTIL HFA) 108 (90 Base) MCG/ACT inhaler Inhale 2 puffs into the lungs every 6 hours as needed for Wheezing 1 Inhaler 3    budesonide-formoterol (SYMBICORT) 160-4.5 MCG/ACT AERO Inhale 2 puffs into the lungs 2 times daily 1 Inhaler 3    furosemide (LASIX) 40 MG tablet Take 40 mg by mouth as needed       digoxin (LANOXIN) 125 MCG tablet Take 125 mcg by mouth daily       Allergies   Allergen Reactions    Pcn [Penicillins]        REVIEW OF SYSTEMS  10 systems reviewed, pertinent positives per HPI otherwise noted to be negative. PHYSICAL EXAM  BP (!) 69/52   Pulse (!) 104   Temp 98.9 °F (37.2 °C) (Axillary)   Resp 20   Wt 202 lb 13.2 oz (92 kg)   SpO2 96%   BMI 40.97 kg/m²    GENERAL APPEARANCE: Awake and alert. Cooperative. Geri Sida appearing. HENT: Normocephalic. Atraumatic. Mucous membranes are dry. NECK: Supple. EYES: PERRL. EOM's grossly intact. HEART/CHEST: Reg rhythm tachycardic rate. No murmurs. No chest wall tenderness. LUNGS: shallow but unlabored respirations. Mild scattered rhonchi. ABDOMEN: No tenderness. Soft. Non-distended. No masses. No organomegaly. No guarding or rebound. Normal bowel sounds throughout. MUSCULOSKELETAL: No extremity edema. Compartments soft. No deformity. No tenderness in the extremities. All extremities neurovascularly intact. SKIN: Warm and dry. No acute rashes. NEUROLOGICAL: Confused, disoriented, but alert. Self-awareness only. Mostly does not answer questions. Limited exam but overall CN's 2-12 intact. No gross facial drooping. Strength 5/5, sensation intact. 2 plus DTR's in knees bilaterally. Gait not assessed. LABS  I have reviewed all labs for this visit.    Results for orders placed or performed during the hospital encounter of 07/08/22   COVID-19 & Influenza Combo    Specimen: Nasopharyngeal Swab   Result Value Ref Range    SARS-CoV-2 RNA, RT PCR NOT DETECTED NOT DETECTED    INFLUENZA A NOT DETECTED NOT DETECTED    INFLUENZA B NOT DETECTED NOT DETECTED   CBC with Auto Differential   Result Value Ref Range    WBC 27.1 (H) 4.0 - 11.0 K/uL    RBC 5.11 4.00 - 5.20 M/uL    Hemoglobin 14.6 12.0 - 16.0 g/dL    Hematocrit 45.3 36.0 - 48.0 %    MCV 88.6 80.0 - 100.0 fL    MCH 28.6 26.0 - 34.0 pg    MCHC 32.3 31.0 - 36.0 g/dL    RDW 16.3 (H) 12.4 - 15.4 %    Platelets 487 848 - 439 K/uL    MPV 9.9 5.0 - 10.5 fL    PLATELET SLIDE REVIEW Increased     SLIDE REVIEW see below     Neutrophils % 81.0 %    Lymphocytes % 8.0 %    Monocytes % 5.0 %    Eosinophils % 0.0 %    Basophils % 0.0 %    Neutrophils Absolute 23.3 (H) 1.7 - 7.7 K/uL    Lymphocytes Absolute 2.4 1.0 - 5.1 K/uL    Monocytes Absolute 1.4 (H) 0.0 - 1.3 K/uL    Eosinophils Absolute 0.0 0.0 - 0.6 K/uL    Basophils Absolute 0.0 0.0 - 0.2 K/uL    Bands Relative 4 0 - 7 %    Atypical Lymphocytes Relative 1 0 - 6 %    Myelocyte Percent 1 (A) %    Anisocytosis 1+ (A)    Comprehensive Metabolic Panel w/ Reflex to MG   Result Value Ref Range    Sodium 139 136 - 145 mmol/L    Potassium reflex Magnesium 4.6 3.5 - 5.1 mmol/L    Chloride 99 99 - 110 mmol/L    CO2 24 21 - 32 mmol/L    Anion Gap 16 3 - 16    Glucose 124 (H) 70 - 99 mg/dL    BUN 82 (HH) 7 - 20 mg/dL    CREATININE 2.4 (H) 0.6 - 1.2 mg/dL    GFR Non- 19 (A) >60    GFR  23 (A) >60    Calcium 9.3 8.3 - 10.6 mg/dL    Total Protein 6.8 6.4 - 8.2 g/dL    Albumin 2.2 (L) 3.4 - 5.0 g/dL    Albumin/Globulin Ratio 0.5 (L) 1.1 - 2.2    Total Bilirubin 0.9 0.0 - 1.0 mg/dL    Alkaline Phosphatase 192 (H) 40 - 129 U/L    ALT 25 10 - 40 U/L    AST 32 15 - 37 U/L   Protime-INR   Result Value Ref Range    Protime 14.7 (H) 11.7 - 14.5 sec    INR 1.15 (H) 0.87 - 1.14   Troponin   Result Value Ref Range    Troponin 0.06 (H) <0.01 ng/mL   Brain Natriuretic Peptide   Result Value Ref Range    Pro-BNP 7,876 (H) 0 - 449 pg/mL   Urinalysis with Reflex to Culture    Specimen: Urine, clean catch   Result Value Ref Range    Color, UA DARK YELLOW (A) Straw/Yellow    Clarity, UA TURBID (A) Clear    Glucose, Ur Negative Negative mg/dL    Bilirubin Urine MODERATE (A) Negative    Ketones, Urine Negative Negative mg/dL    Specific Gravity, UA 1.015 1.005 - 1.030    Blood, Urine MODERATE (A) Negative    pH, UA 7.5 5.0 - 8.0    Protein,  Negative mg/dL    Urobilinogen, Urine 1.0 <2.0 E.U./dL    Nitrite, Urine Negative Negative    Leukocyte Esterase, Urine LARGE (A) Negative    Microscopic Examination YES     Urine Type NotGiven    Lactic Acid   Result Value Ref Range    Lactic Acid 4.9 (HH) 0.4 - 2.0 mmol/L   Procalcitonin   Result Value Ref Range    Procalcitonin 1.16 (H) 0.00 - 0.15 ng/mL   Lipase   Result Value Ref Range    Lipase 24.0 13.0 - 60.0 U/L   EKG 12 Lead   Result Value Ref Range    Ventricular Rate 102 BPM Atrial Rate 102 BPM    P-R Interval 132 ms    QRS Duration 76 ms    Q-T Interval 348 ms    QTc Calculation (Bazett) 453 ms    P Axis 55 degrees    R Axis 1 degrees    T Axis 120 degrees    Diagnosis       Sinus tachycardiaNonspecific T wave abnormalityAbnormal ECG     The 12 lead EKG was interpreted by me as follows:  Rate: tachycardia with a rate of 102  Rhythm: sinus  Axis: normal  Intervals: normal VA, narrow QRS, normal QTc  ST segments: no ST elevations or depressions  T waves: no abnormal inversions  Non-specific T wave changes: present  Prior EKG comparison: EKG dated 2/14/22 is not significantly different    RADIOLOGY    CT HEAD WO CONTRAST    Result Date: 7/8/2022  EXAMINATION: CT OF THE HEAD WITHOUT CONTRAST  7/8/2022 3:01 pm TECHNIQUE: CT of the head was performed without the administration of intravenous contrast. Automated exposure control, iterative reconstruction, and/or weight based adjustment of the mA/kV was utilized to reduce the radiation dose to as low as reasonably achievable. COMPARISON: None. HISTORY: ORDERING SYSTEM PROVIDED HISTORY: ams TECHNOLOGIST PROVIDED HISTORY: Reason for exam:->ams Has a \"code stroke\" or \"stroke alert\" been called? ->No Decision Support Exception - unselect if not a suspected or confirmed emergency medical condition->Emergency Medical Condition (MA) Reason for Exam: Altered Mental Status (increased confusion; alert to person only; fom home; hx UTI; FAST=2; on home oxygen 2lpm; [id number]) FINDINGS: BRAIN/VENTRICLES: There is no acute intracranial hemorrhage, mass effect or midline shift. No abnormal extra-axial fluid collection. The gray-white differentiation is maintained without evidence of an acute infarct. There is no evidence of hydrocephalus. Generalized atrophy and scattered senescent white matter changes are present. ORBITS: The visualized portion of the orbits demonstrate no acute abnormality.  SINUSES: The visualized paranasal sinuses and mastoid air cells demonstrate no acute abnormality. SOFT TISSUES/SKULL:  No acute abnormality of the visualized skull or soft tissues. Prominent degenerative changes noted at the C1-C2 articulation. No acute intracranial abnormality. XR CHEST PORTABLE    Result Date: 7/8/2022  EXAMINATION: ONE XRAY VIEW OF THE CHEST 7/8/2022 2:38 pm COMPARISON: February 14, 2022 HISTORY: ORDERING SYSTEM PROVIDED HISTORY: ams TECHNOLOGIST PROVIDED HISTORY: Reason for exam:->ams Reason for Exam: Ams FINDINGS: Marginal inspiration is present. Interstitial prominence is again noted within the lungs, with a basilar predominance. Evidence of old granulomatous disease is noted. Heart size and mediastinal contours are stable. No acute osseous abnormality is present. Marginal inspiration, without evidence of acute cardiopulmonary disease       ED COURSE/MDM  Patient seen and evaluated. Old records reviewed. Labs and imaging reviewed and results discussed with patient. After initial evaluation, differential diagnostic considerations included: stroke, TIA, intracranial bleed, trauma, infection/sepsis, medication side effect, intoxication/withdrawal, metabolic/electrolyte abnormalities    The patient's ED workup was notable for septic shock identified while in the ED, with afebrile is hypotensive tachycardic with leukocytosis and profoundly purulent appearing urine. Patient has no respiratory symptoms and chest x-ray is without infiltrate. Head CT is negative. Patient had a notably elevated lactate will be rechecked, procalcitonin elevation also suggest bacterial source of infection, and COVID and flu are negative. Patient has an PRABHA and encephalopathy on top of baseline dementia. Due to hypotension, although it was slightly improving with initiation of fluid resuscitation, wanted to treat septic shock aggressively upfront so ordered Levophed and placed a right femoral central line. Will admit to the ICU.       Is this patient to be included in the SEP-1 Core Measure? Yes   SEP-1 CORE MEASURE DATA      Sepsis Criteria   Severe Sepsis Criteria   Septic Shock Criteria     Must be confirmed or suspected to move forward with diagnosis of sepsis. Must meet 2:    [] Temperature > 100.9 F (38.3 C)        or < 96.8 F (36 C)  [x] HR > 90  [] RR > 20  [x] WBC > 12 or < 4 or 10% bands      AND:      [x] Infection Confirmed or        Suspected. Must meet 1:    [x] Lactate > 2       or   [x] Signs of Organ Dysfunction:    - SBP < 90 or MAP < 65  - Altered mental status  - Creatinine > 2 or increased from      baseline  - Urine Output < 0.5 ml/kg/hr  - Bilirubin > 2  - INR > 1.5 (not anticoagulated)  - Platelets < 864,637  - Acute Respiratory Failure as     evidenced by new need for NIPPV     or mechanical ventilation      [] No criteria met for Severe Sepsis. Must meet 1:    [x] Lactate > 4        or   [] SBP < 90 or MAP < 65 for at        least two readings in the first        hour after fluid bolus        administration      [x] Vasopressors initiated (if hypotension persists after fluid resuscitation)        [] No criteria met for Septic Shock.    Patient Vitals for the past 6 hrs:   BP Temp Pulse Resp SpO2 Weight Percent Weight Change   07/08/22 1715 -- 98.9 °F (37.2 °C) (!) 104 20 96 % -- --   07/08/22 1716 (!) 69/52 -- -- -- -- -- --   07/08/22 1724 -- -- -- -- -- 202 lb 13.2 oz (92 kg) 0      Recent Labs     07/08/22  1738   WBC 27.1*   LACTA 4.9*   CREATININE 2.4*   BILITOT 0.9   INR 1.15*            Time Septic Shock Identified: 6:00pm    Fluid Resuscitation Rational: at least 30mL/kg based on entered actual weight at time of triage    Repeat lactate level: ordered and pending at this time    Reassessment Exam:   I have reassessed tissue perfusion and hemodynamic status after fluid bolus at this time: 7:15pm      Central Line Placement Procedure Note    Indication: vascular access and centrally administered medications    Consent: The family members were counseled regarding the procedure, its indications, risks, potential complications and alternatives, and any questions were answered. Consent was obtained to proceed. Location:  The central line was placed in the right femoral vein due to ease of ultrasound accessibility     Procedure: The patient was positioned appropriately and the skin over the right femoral vein was prepped with chloraprep and draped in a sterile fashion. Local anesthesia was obtained by direct infiltration with 1% plain lidocaine. A large bore needle was used to identify the vein under direct visualization and guidance with sterile ultrasound (linear probe on vascular settings). See details of ultrasound component below. A guide wire was then inserted into the vein through the needle. A triple lumen catheter was then inserted into the vessel over the guide wire using the Seldinger technique. All ports showed good, free flowing blood return and were flushed with saline solution. The catheter was then securely fastened to the skin with sutures and covered with a sterile dressing. A post procedure X-ray was not indicated. The patient tolerated the procedure well.     Complications: None    Ultrasound guided central venous access  Indication: need for central access as documented above  Views:  Long access view of target vein: Adequate  Short access view of target vein: Adequate  Adjacent artery: Adequate    Images obtained with findings:   Vein compressible: yes  Needle tip within vein: yes    Impression:   Successful cannulation of central vein: yes     Images obtained by Leila Garzon MD, interpreted by Leila Garzon MD.      During the patient's ED course, the patient was given:  Medications   0.9 % sodium chloride infusion (has no administration in time range)   vancomycin (VANCOCIN) 1,500 mg in dextrose 5 % 250 mL IVPB (has no administration in time range)   meropenem (MERREM) 1,000 mg in sodium chloride 0.9 % 100 mL IVPB (mini-bag) (has no administration in time range)   norepinephrine (LEVOPHED) 16 mg in sodium chloride 0.9 % 250 mL infusion (has no administration in time range)        CLINICAL IMPRESSION  1. Septic shock ([address]    2. Acute cystitis without hematuria    3. Acute encephalopathy    4. PRABHA (acute kidney injury) ([address]        Blood pressure (!) 69/52, pulse (!) 104, temperature 98.9 °F (37.2 °C), temperature source Axillary, resp. rate 20, weight 202 lb 13.2 oz (92 kg), SpO2 96 %. DISPOSITION  Jose Manuel Gregg was admitted in fair condition. The plan is to admit to the hospital at this time under the hospitalist service. Hospitalist accepted the patient and will take over the patient's care. The total critical care time I independently spent while evaluating and treating this patient was 90 minutes. This excludes time spent doing separately billable procedures. This includes time at the bedside, data interpretation, medication management, obtaining critical history from collateral sources if the patient is unable to provide it directly, and physician consultation. Specifics of interventions taken and potentially life-threatening diagnostic considerations are listed above in the medical decision making. If this was a shared visit with an DONTRELL, the time in this attestation is non-concurrent critical care time out of the total shared critical care time provided by the DONTRELL and myself. DISCLAIMER: This chart was created using Dragon dictation software. Efforts were made by me to ensure accuracy, however some errors may be present due to limitations of this technology and occasionally words are not transcribed correctly.         Ady Bowen MD  07/08/22 2478

## 2022-07-09 PROBLEM — I50.32 CHRONIC DIASTOLIC (CONGESTIVE) HEART FAILURE (HCC): Status: ACTIVE | Noted: 2022-01-01

## 2022-07-09 PROBLEM — J44.9 COPD (CHRONIC OBSTRUCTIVE PULMONARY DISEASE) (HCC): Status: ACTIVE | Noted: 2022-01-01

## 2022-07-09 NOTE — PROGRESS NOTES
Patient admitted to unit from ED, vanco infusing and levophed at 7. Daughter(POA) with patient. Patient lives with grandson, hx of UTI. Patient alert to self and place at baseline.

## 2022-07-09 NOTE — H&P
Hospital Medicine History & Physical      PCP: Tihago Peña DO    Date of Admission: 7/8/2022    Date of Service: Pt seen/examined on 07/09/22 and Admitted to Inpatient with expected LOS greater than two midnights due to medical therapy. Chief Complaint:  Altered mental status       History Of Present Illness:      Alcides Trimble is 80 y.o. female with history of HTN, dCHF. COPD and Alzheimer's dementia   She now presents to the ER with complaint of altered mental status and lethargy  Patient unable to provide history due to her dementia  Daughter at the bedside states her mental status is worse than her baseline  Upon arrival to the ED, patient was found hypotensive, confused and disoriented  Urinalysis suggestive purulent UTI therefore patient admitted for sepsis with septic shock due to UTI        Past Medical History:          Diagnosis Date    Arthritis     [facility]     COPD [facility]ronic obstructive [facility] disease) [facility]     Dementia [facility] [facility]     Depression     Hypertension        Past Surgical History:          Procedure Laterality Date    HYSTERECTOMY (CERVIX STATUS UNKNOWN)         Medications Prior to Admission:      Prior to Admission medications    Medication Sig Start Date End Date Taking? Authorizing Provider   cetirizine (ZYRTEC) 10 MG tablet Take 10 mg by mouth daily    Historical Provider, MD   venlafaxine (EFFEXOR XR) 75 MG extended release capsule Take 150 mg by mouth daily 75mg x 2    Historical Provider, MD   donepezil (ARICEPT) 5 MG tablet Take 5 mg by mouth nightly    Historical Provider, MD   loratadine (CLARITIN) 10 MG tablet Take 10 mg by mouth daily    Historical Provider, MD   gabapentin (NEURONTIN) 100 MG capsule Take 100 mg by mouth 3 times daily.     Historical Provider, MD   traZODone (DESYREL) 50 MG tablet Take 50 mg by mouth nightly    Historical Provider, MD   potassium chloride (KLOR-CON) 20 MEQ packet Take 20 mEq by mouth daily Historical Provider, MD   spironolactone (ALDACTONE) 25 MG tablet Take 0.5 tablets by mouth daily 10/10/18   QUITA Veloz - CNP   albuterol sulfate HFA (PROVENTIL HFA) 108 (90 Base) MCG/ACT inhaler Inhale 2 puffs into the lungs every 6 hours as needed for Wheezing 9/27/18   Severo Moon, MD   budesonide-formoterol [facility]) 160-4.5 MCG/ACT AERO Inhale 2 puffs into the lungs 2 times daily 9/27/18   Severo Moon, MD   furosemide (LASIX) 40 MG tablet Take 40 mg by mouth as needed     Historical Provider, MD   digoxin (LANOXIN) 125 MCG tablet Take 125 mcg by mouth daily    Historical Provider, MD       Allergies:  Pcn [penicillins]    Social History:      The patient currently lives at nursing home    TOBACCO:   reports that she has quit smoking. She has never used smokeless tobacco.  ETOH:   reports no history of alcohol use. E-cigarette/Vaping     Questions Responses    E-cigarette/Vaping Use Never User    Start Date     Passive Exposure     Quit Date     Counseling Given     Comments             Family History:       Reviewed and negative in regards to presenting illness/complaint. REVIEW OF SYSTEMS COMPLETED:   Pertinent positives as noted in the HPI. All other systems reviewed and negative. PHYSICAL EXAM PERFORMED:    BP (!) 104/52   Pulse (!) 108   Temp 98.9 °F (37.2 °C) (Axillary)   Resp 19   Wt 202 lb 13.2 oz (92 kg)   SpO2 (!) 63%   BMI 40.97 kg/m²     General appearance:  No apparent distress, appears stated age and cooperative. HEENT:  Normal cephalic, atraumatic without obvious deformity. Pupils equal, round, and reactive to light. Extra ocular muscles intact. Conjunctivae/corneas clear. Neck: Supple, with full range of motion. No jugular venous distention. Trachea midline. Respiratory:  Normal respiratory effort. Clear to auscultation, bilaterally without Rales/Wheezes/Rhonchi.   Cardiovascular:  Regular rate and rhythm with normal S1/S2 without murmurs, rubs or gallops. Abdomen: Soft, non-tender, non-distended with normal bowel sounds. Musculoskeletal:  No clubbing, cyanosis or edema bilaterally. Full range of motion without deformity. Skin: Skin color, texture, turgor normal.  No rashes or lesions. Neurologic:  Neurovascularly intact without any focal sensory/motor deficits. Cranial nerves: II-XII intact, grossly non-focal.  Psychiatric:  Alert and oriented, thought content appropriate, normal insight  Capillary Refill: Brisk,3 seconds, normal  Peripheral Pulses: +2 palpable, equal bilaterally       Labs:     Recent Labs     07/08/22  1738   WBC 27.1*   HGB 14.6   HCT 45.3        Recent Labs     07/08/22  1738      K 4.6   CL 99   CO2 24   BUN 82*   CREATININE 2.4*   CALCIUM 9.3     Recent Labs     07/08/22  1738   AST 32   ALT 25   BILITOT 0.9   ALKPHOS 192*     Recent Labs     07/08/22  1738   INR 1.15*     Recent Labs     07/08/22  1738   TROPONINI 0.06*       Urinalysis:      Lab Results   Component Value Date/Time    NITRU Negative 07/08/2022 06:56 PM    WBCUA 4625 07/08/2022 06:56 PM    BACTERIA 4+ 07/08/2022 06:56 PM    RBCUA 164 07/08/2022 06:56 PM    BLOODU MODERATE 07/08/2022 06:56 PM    SPECGRAV 1.015 07/08/2022 06:56 PM    GLUCOSEU Negative 07/08/2022 06:56 PM       Radiology:     CXR: I have reviewed the CXR with the following interpretation: No acute cardiopulmonary disease  EKG:  I have reviewed the EKG with the following interpretation:     CT HEAD WO CONTRAST   Final Result   No acute intracranial abnormality. XR CHEST PORTABLE   Final Result   Marginal inspiration, without evidence of acute cardiopulmonary disease             Consults:    None    ASSESSMENT:    1. Sepsis with septic shock secondary to UTI  - present prior to admission  2. Urinary tract infection  3. Acute kidney injury  4. Severely allergies to penicillin and cephalosporins  5. Chronic diastolic congestive heart failure  6.  Chronic obstructive pulmonary disease  7. Hypertension  8. Alzheimer's dementia  9. Depression        PLAN:    1. Admit to the ICU  2. IV fluid infusion and pressor support to keep MAP greater than 65 per sepsis protocol  3. Due to severe allergies to penicillin and cephalosporin, IV Merrem as ordered for UTI  4. Per urine culture may de-escalate IV antibiotic to Levaquin  5. Monitor urine output and renal function via daily BMP  6. Hold home BP meds and diuretics such as Lasix and Aldactone due to hypotension  7. Continue usual home meds including digoxin, donezepil for dementia and Effexor for depression  8. Check a.m. cortisol and TSH    DVT Prophylaxis: Lovenox  Diet: Diet NPO Exceptions are: Sips of Water with Meds  Code Status: Full Code    PT/OT Eval Status: PT/OT consult is ordered    Dispo - Admit as inpatient       Monica Romero MD    Thank you Porfirio Pan DO for the opportunity to be involved in this patient's care. If you have any questions or concerns please feel free to contact me at [phone].

## 2022-07-09 NOTE — PROGRESS NOTES
Patient asystole on monitor. Absent pulses noted and absent breath sounds noted. Daughter and Cherri Fails at bedside. Dr Thanh Christian notified for pronouncement. Family requests Georgie Noyola  in [address] [place] [address].

## 2022-07-09 NOTE — PROGRESS NOTES
Responded to page and call-back for grief and spiritual support made by Kirsten Brewster RN, on behalf of patient's family. Spiritual care provided at bedside for patient's daughter, Olvin Stone, and her spouse who joined us later in visit. Daughter shared patient's health history in the context of expressing thoughts and feelings about end-of-life. She reminisced about the relationship she and the patient's family have shared with patient. Daughter spoke of patient's floyd and the reassurance that \". ..she will be in a better and more beautiful place. \"  Grief and spiritual support provided through active listening, affirmation, pastoral presence, scripture, and prayer. No further spiritual care needs expressed by family at this time. Pre and Post-visit consult with attending and referring RN.  to follow-up with a card and grief resource materials for family.

## 2022-07-09 NOTE — SIGNIFICANT EVENT
Called to bedside by RN  Patient continues to be hypotensive 69/29 despite Levophed and vasopressin  On exam, patient in agonal breathing and not responsive to painful stimuli  Daughter is at the bedside who is her POA  Daughter would like to proceed with comfort care ie DNR CC  Daughter does not want patient to be intubated    We will go ahead and initiate comfort care measures with as needed IV morphine and IV Versed    Neeta Schilling MD  07/09/22 4:16 AM

## 2022-07-10 LAB
C. DIFFICILE TOXIN MOLECULAR: NORMAL
URINE CULTURE, ROUTINE: NORMAL

## 2022-07-12 LAB — CULTURE, BLOOD 2: NORMAL

## 2022-07-13 LAB
BLOOD CULTURE, ROUTINE: ABNORMAL
BLOOD CULTURE, ROUTINE: ABNORMAL
CULTURE, BLOOD 2: ABNORMAL
ORGANISM: ABNORMAL

## 2022-08-04 NOTE — PROGRESS NOTES
Physician Progress Note      PATIENT:               Alicia Nieves  CSN #:                  [id number]  :                       1931  ADMIT DATE:       2022 5:08 PM  [address] [place] DATE:        2022 8:02 AM  RESPONDING  PROVIDER #:        Alison Novoa MD        QUERY TEXT:    Type of Encephalopathy: Please provide further specificity, if known. Clinical indicators include: altered mental status, fevers, alcohol use,   influenza, ams, intracranial hemorrhage, intracranial bleed, infection,   sepsis, septic, encephalopathy, acute  Options provided:  -- Anoxic/hypoxic encephalopathy  -- Metabolic encephalopathy  -- Toxic encephalopathy  -- Hepatic encephalopathy  -- Hypertensive encephalopathy  -- Other - I will add my own diagnosis  -- Disagree - Not applicable / Not valid  -- Disagree - Clinically Unable to determine / Unknown        PROVIDER RESPONSE TEXT:    The patient has metabolic encephalopathy.       Electronically signed by:  Alison Novoa MD 2022 6:24 AM

## 2022-08-05 NOTE — DISCHARGE SUMMARY
[facility]    Patient Demographics    Patient. Leo Cannon  Date of Birth. 8/6/1931  MRN. [id number]     Primary care provider. [address] [place], [place]  (Tel: [phone])    Admit date: 7/8/2022    Discharge date (blank if same as Note Date): 7/9/2022  Note Date: 8/5/2022     Reason for Hospitalization. Chief Complaint   Patient presents with    Altered Mental Status     increased confusion; alert to person only; fom home; hx UTI; FAST=2; on home oxygen 2lpm; CHCC=844         Significant Findings. Principal Problem:    Sepsis secondary to UTI [facility])  Active Problems:    Chronic diastolic (congestive) heart failure (HCC)    COPD (chronic obstructive pulmonary disease) ([facility])    HTN (hypertension)    PRABHA (acute kidney injury) ([facility] [address]    Depression    Dementia ([facility])  Resolved Problems:    * No resolved hospital problems. *       Problems and results from this hospitalization that need follow up. None     Significant test results and incidental findings. Invasive procedures and treatments. None     [facility] Course. Leo Cannon is 80 y.o. female with history of HTN, dCHF.  COPD and Alzheimer's dementia  She now presents to the ER with complaint of altered mental status and lethargy  Patient unable to provide history due to her dementia  Daughter at the bedside states her mental status is worse than her baseline  Upon arrival to the ED, patient was found hypotensive, confused and disoriented  Urinalysis suggestive purulent UTI therefore patient admitted for sepsis with septic shock due to UTI    Called to bedside by RN  Patient continues to be hypotensive 69/29 despite Levophed and vasopressin  On exam, patient in agonal breathing and not responsive to painful stimuli  Daughter is at the bedside who is her POA  Daughter would like to proceed with comfort care ie DNR CC  Daughter does not want patient to be intubated     We will go ahead and initiate comfort care measures with as needed IV morphine and IV Versed    Consults. IP CONSULT TO SPIRITUAL SERVICES  IP CONSULT TO PALLIATIVE CARE    Physical examination on discharge day. No pulse. No breathing. No heart beat. Condition at time of discharge     Medication instructions provided to patient at discharge. Medication List        ASK your doctor about these medications      albuterol sulfate  (90 Base) MCG/ACT inhaler  Commonly known as: Proventil HFA  Inhale 2 puffs into the lungs every 6 hours as needed for Wheezing     budesonide-formoterol 160-4.5 MCG/ACT Aero  Commonly known as: Symbicort  Inhale 2 puffs into the lungs 2 times daily     cetirizine 10 MG tablet  Commonly known as: ZYRTEC     digoxin 125 MCG tablet  Commonly known as: LANOXIN     donepezil 5 MG tablet  Commonly known as: ARICEPT     furosemide 40 MG tablet  Commonly known as: LASIX     gabapentin 100 MG capsule  Commonly known as: NEURONTIN     loratadine 10 MG tablet  Commonly known as: CLARITIN     potassium chloride 20 MEQ packet  Commonly known as: KLOR-CON     spironolactone 25 MG tablet  Commonly known as: ALDACTONE  Take 0.5 tablets by mouth daily     traZODone 50 MG tablet  Commonly known as: DESYREL     venlafaxine 75 MG extended release capsule  Commonly known as: EFFEXOR XR              Discharge recommendations given to patient. None     Spent 20 minutes in discharge process.     Signed:  Alfa Wolfe MD     8/5/2022 7:30 AM

## 2022-08-05 NOTE — PROGRESS NOTES
Physician Progress Note      PATIENT:               Alyssa Key  CSN #:                  [id number]  :                       1931  ADMIT DATE:       2022 5:08 PM  [address] [place] DATE:        2022 8:02 AM  RESPONDING  PROVIDER #:        Kenneth Deluca MD          QUERY TEXT:    Pt admitted with sepsis, septic shock. Pt noted to have oxygen sats down to   63%  on 10 L high-flow. If possible, please document in the progress notes and   discharge summary if you are evaluating and/or treating any of the following: The medical record reflects the following:  Risk Factors: sepsis, septic shock, pmhx CHF, COPD    Clinical Indicators: per nursing flowsheets 1954 sats noted 63%, 10L HFNC   noted @2207  per progress notes: On exam, patient in agonal breathing and not responsive to   painful stimuli    Treatment: comfort care measures, supplemental oxygen - 10L highflow O2,   supportive care and monitoring  Options provided:  -- Acute respiratory failure with hypoxia  -- Other - I will add my own diagnosis  -- Disagree - Not applicable / Not valid  -- Disagree - Clinically unable to determine / Unknown  -- Refer to Clinical Documentation Reviewer    PROVIDER RESPONSE TEXT:    This patient is in acute respiratory failure with hypoxia.     Query created by: Roxie Musa on 2022 10:54 AM      Electronically signed by:  Kenneth Deluca MD 2022 7:28 AM

## 2023-01-29 NOTE — PROGRESS NOTES
Guidelines:    1. All Medications will be ordered by a physician, and their frequency and/or modality will be adjusted as defined by the patients Respiratory Severity Index (RSI) score. 2. If the patient does not have documented COPD, consider discontinuing anticholinergics when RSI is less than 9.  3. If the bronchospasm worsens (increased RSI), then the bronchodilator frequency can be increased to a maximum of every 4 hours. If greater than every 4 hours is required, the physician will be contacted. 4. If the bronchospasm improves, the frequency of the bronchodilator can be decreased, based on the patient's RSI, but not less than home treatment regimen frequency. 5. Bronchodilator(s) will be discontinued if patient has a RSI less than 9 and has received no scheduled or as needed treatment for 72  Hrs. Patients Ordered on a Mucolytic Agent:    1. Must always be administered with a bronchodilator. 2. Discontinue if patient experiences worsened bronchospasm, or secretions have lessened to the point that the patient is able to clear them with a cough. Anti-inflammatory and Combination Medications:    1. If the patient lacks prior history of lung disease, is not using inhaled anti-inflammatory medication at home, and lacks wheezing by examination or by history for at least 24 hours, contact physician for possible discontinuation. Applied

## 2023-10-16 NOTE — PROGRESS NOTES
Hospitalist Progress Note      PCP: Kamilah Salinas DO    Date of Admission: 9/23/2018    Chief Complaint: SOB    Hospital Course: Shannon Gilford is a 80 y.o. female who presented with worsening sob. Onset about few days ago that got worse. Pt with hx of CHF. States saw pcp and was asked to double up pn lasix. Pt symptosm got worse despite lasix. Pt with increase swelling. Sob is worse on exertion and laying flay. Some productive cough. No fever or chills. No n/v/d. Hx of copd     Subjective:   Feeling better today. She is nearing D/C and is anxious to go home. Medications:  Reviewed    Infusion Medications   Scheduled Medications    furosemide  40 mg Oral BID    ipratropium-albuterol  1 ampule Inhalation BID    pneumococcal 13-valent conjugate  0.5 mL Intramuscular Once    spironolactone  25 mg Oral Daily    venlafaxine  150 mg Oral Daily    sodium chloride flush  10 mL Intravenous 2 times per day    enoxaparin  40 mg Subcutaneous Daily    mometasone-formoterol  2 puff Inhalation BID     PRN Meds: ipratropium-albuterol, benzonatate, sodium chloride flush, magnesium hydroxide, ondansetron      Intake/Output Summary (Last 24 hours) at 09/26/18 1807  Last data filed at 09/26/18 1643   Gross per 24 hour   Intake                0 ml   Output             1400 ml   Net            -1400 ml       Physical Exam Performed:    /69   Pulse 95   Temp 98.2 °F (36.8 °C) (Oral)   Resp 18   Ht 4' 10\" (1.473 m)   Wt 244 lb 3.2 oz (110.8 kg)   SpO2 94%   BMI 51.04 kg/m²     General appearance:  Appears comfortable. Well nourished  Eyes: Sclera clear, pupils equal  ENT: Moist mucus membranes, no thrush. Trachea midline. Cardiovascular: Regular rhythm, normal S1, S2. No murmur, gallop, rub.  No edema in lower extremities  Respiratory: b/l wheezing with crackels at the base   Gastrointestinal: Abdomen soft, non-tender, not distended, normal bowel sounds  Musculoskeletal: No cyanosis in digits, Pl update once we have information from Dr Moreno team    Thanks    Sylvain Del Toro MD     injury) ([facility] [address] [N17.9] 09/23/2018    Hypokalemia [E87.6] 09/23/2018        Acute on chronic CHF exab  - likely cause of pt symptoms. Failed outpatient regimen.   - given 80 of iv lasix in the ER. We will monitor for response today  - hold any further iv lasix today. Start iv lasix tomorrow. - cardiology consulted. - daily weight, low salt diet,   - place cisneros. Monitor output.        UTI  - noted on ua  - treat with rocephin.  - follow up on culture - no growht  - d/c abx     2, PRABHA  With hyponatremia and hypokalemia  Believe yoko in setting of diuretics and HCTZ and CHF  -replace lytes  - nephrology consulted.      3. Hyponatremia  Likely due to medication and above.      4. COPD  - does have some wheezing.   - will give one dose of steroids  - duonebs. 5. Acute respiratory failure    - due to combination of CHF, copd and possible bronchitis vs pneumonia.  -covered with rocephin. abx stopped    6. Morbid obesity due to excess calories (Body mass index is 51.04 kg/m². ) - Complicating assessment and treatment. Placing patient at risk for multiple co-morbidities as well as early death and contributing to the patient's presentation. .         DVT Prophylaxis: lovenox  Diet: DIET CARDIAC;  Code Status: Full Code    PT/OT Eval Status: eval and treat     Dispo - anticiapte d/c in the next 24 hours.     Faiza Espinosa MD